# Patient Record
Sex: FEMALE | Race: WHITE | Employment: OTHER | URBAN - METROPOLITAN AREA
[De-identification: names, ages, dates, MRNs, and addresses within clinical notes are randomized per-mention and may not be internally consistent; named-entity substitution may affect disease eponyms.]

---

## 2017-01-09 PROBLEM — E55.9 VITAMIN D DEFICIENCY: Status: ACTIVE | Noted: 2017-01-09

## 2017-03-05 PROBLEM — K75.0 LIVER ABSCESS: Status: ACTIVE | Noted: 2017-03-05

## 2017-03-05 PROBLEM — R63.4 WEIGHT LOSS: Status: ACTIVE | Noted: 2017-03-05

## 2017-03-05 PROBLEM — R11.2 NAUSEA AND VOMITING: Status: ACTIVE | Noted: 2017-03-05

## 2017-03-05 PROBLEM — R19.7 DIARRHEA OF PRESUMED INFECTIOUS ORIGIN: Status: ACTIVE | Noted: 2017-03-05

## 2019-07-10 ENCOUNTER — HOSPITAL ENCOUNTER (OUTPATIENT)
Age: 74
Discharge: HOME | End: 2019-07-12
Payer: MEDICARE

## 2019-07-10 ENCOUNTER — OFFICE VISIT (OUTPATIENT)
Dept: FAMILY MEDICINE CLINIC | Age: 74
End: 2019-07-10
Payer: MEDICARE

## 2019-07-10 VITALS
HEART RATE: 77 BPM | TEMPERATURE: 98.2 F | OXYGEN SATURATION: 96 % | WEIGHT: 244 LBS | RESPIRATION RATE: 18 BRPM | BODY MASS INDEX: 34.93 KG/M2 | SYSTOLIC BLOOD PRESSURE: 132 MMHG | DIASTOLIC BLOOD PRESSURE: 74 MMHG | HEIGHT: 70 IN

## 2019-07-10 DIAGNOSIS — R73.03 PRE-DIABETES: ICD-10-CM

## 2019-07-10 DIAGNOSIS — R53.82 CHRONIC FATIGUE: ICD-10-CM

## 2019-07-10 DIAGNOSIS — F32.A DEPRESSION, UNSPECIFIED DEPRESSION TYPE: ICD-10-CM

## 2019-07-10 DIAGNOSIS — Z13.31 POSITIVE DEPRESSION SCREENING: Primary | ICD-10-CM

## 2019-07-10 DIAGNOSIS — Z12.39 BREAST CANCER SCREENING: ICD-10-CM

## 2019-07-10 PROCEDURE — G8427 DOCREV CUR MEDS BY ELIG CLIN: HCPCS | Performed by: STUDENT IN AN ORGANIZED HEALTH CARE EDUCATION/TRAINING PROGRAM

## 2019-07-10 PROCEDURE — G8417 CALC BMI ABV UP PARAM F/U: HCPCS | Performed by: STUDENT IN AN ORGANIZED HEALTH CARE EDUCATION/TRAINING PROGRAM

## 2019-07-10 PROCEDURE — 36415 COLL VENOUS BLD VENIPUNCTURE: CPT

## 2019-07-10 PROCEDURE — 85025 COMPLETE CBC W/AUTO DIFF WBC: CPT

## 2019-07-10 PROCEDURE — 82728 ASSAY OF FERRITIN: CPT

## 2019-07-10 PROCEDURE — 1123F ACP DISCUSS/DSCN MKR DOCD: CPT | Performed by: STUDENT IN AN ORGANIZED HEALTH CARE EDUCATION/TRAINING PROGRAM

## 2019-07-10 PROCEDURE — 83550 IRON BINDING TEST: CPT

## 2019-07-10 PROCEDURE — 83036 HEMOGLOBIN GLYCOSYLATED A1C: CPT

## 2019-07-10 PROCEDURE — 99213 OFFICE O/P EST LOW 20 MIN: CPT | Performed by: STUDENT IN AN ORGANIZED HEALTH CARE EDUCATION/TRAINING PROGRAM

## 2019-07-10 PROCEDURE — 3017F COLORECTAL CA SCREEN DOC REV: CPT | Performed by: STUDENT IN AN ORGANIZED HEALTH CARE EDUCATION/TRAINING PROGRAM

## 2019-07-10 PROCEDURE — 4040F PNEUMOC VAC/ADMIN/RCVD: CPT | Performed by: STUDENT IN AN ORGANIZED HEALTH CARE EDUCATION/TRAINING PROGRAM

## 2019-07-10 PROCEDURE — 80053 COMPREHEN METABOLIC PANEL: CPT

## 2019-07-10 PROCEDURE — G0444 DEPRESSION SCREEN ANNUAL: HCPCS | Performed by: STUDENT IN AN ORGANIZED HEALTH CARE EDUCATION/TRAINING PROGRAM

## 2019-07-10 PROCEDURE — 80061 LIPID PANEL: CPT

## 2019-07-10 PROCEDURE — G8431 POS CLIN DEPRES SCRN F/U DOC: HCPCS | Performed by: STUDENT IN AN ORGANIZED HEALTH CARE EDUCATION/TRAINING PROGRAM

## 2019-07-10 PROCEDURE — 1036F TOBACCO NON-USER: CPT | Performed by: STUDENT IN AN ORGANIZED HEALTH CARE EDUCATION/TRAINING PROGRAM

## 2019-07-10 PROCEDURE — 36415 COLL VENOUS BLD VENIPUNCTURE: CPT | Performed by: FAMILY MEDICINE

## 2019-07-10 PROCEDURE — 1090F PRES/ABSN URINE INCON ASSESS: CPT | Performed by: STUDENT IN AN ORGANIZED HEALTH CARE EDUCATION/TRAINING PROGRAM

## 2019-07-10 PROCEDURE — 99212 OFFICE O/P EST SF 10 MIN: CPT | Performed by: STUDENT IN AN ORGANIZED HEALTH CARE EDUCATION/TRAINING PROGRAM

## 2019-07-10 PROCEDURE — 83540 ASSAY OF IRON: CPT

## 2019-07-10 PROCEDURE — G8400 PT W/DXA NO RESULTS DOC: HCPCS | Performed by: STUDENT IN AN ORGANIZED HEALTH CARE EDUCATION/TRAINING PROGRAM

## 2019-07-10 RX ORDER — POLYETHYLENE GLYCOL 3350 17 G/17G
17 POWDER, FOR SOLUTION ORAL DAILY PRN
COMMUNITY

## 2019-07-10 SDOH — HEALTH STABILITY: MENTAL HEALTH: HOW MANY STANDARD DRINKS CONTAINING ALCOHOL DO YOU HAVE ON A TYPICAL DAY?: 1 OR 2

## 2019-07-10 SDOH — HEALTH STABILITY: MENTAL HEALTH: HOW OFTEN DO YOU HAVE A DRINK CONTAINING ALCOHOL?: 2-3 TIMES A WEEK

## 2019-07-10 ASSESSMENT — ENCOUNTER SYMPTOMS
CHEST TIGHTNESS: 0
ABDOMINAL PAIN: 0
SHORTNESS OF BREATH: 0

## 2019-07-10 ASSESSMENT — PATIENT HEALTH QUESTIONNAIRE - PHQ9
6. FEELING BAD ABOUT YOURSELF - OR THAT YOU ARE A FAILURE OR HAVE LET YOURSELF OR YOUR FAMILY DOWN: 3
9. THOUGHTS THAT YOU WOULD BE BETTER OFF DEAD, OR OF HURTING YOURSELF: 3
5. POOR APPETITE OR OVEREATING: 2
1. LITTLE INTEREST OR PLEASURE IN DOING THINGS: 0
4. FEELING TIRED OR HAVING LITTLE ENERGY: 2
SUM OF ALL RESPONSES TO PHQ9 QUESTIONS 1 & 2: 3
7. TROUBLE CONCENTRATING ON THINGS, SUCH AS READING THE NEWSPAPER OR WATCHING TELEVISION: 3
SUM OF ALL RESPONSES TO PHQ QUESTIONS 1-9: 18
8. MOVING OR SPEAKING SO SLOWLY THAT OTHER PEOPLE COULD HAVE NOTICED. OR THE OPPOSITE, BEING SO FIGETY OR RESTLESS THAT YOU HAVE BEEN MOVING AROUND A LOT MORE THAN USUAL: 2
SUM OF ALL RESPONSES TO PHQ QUESTIONS 1-9: 18
2. FEELING DOWN, DEPRESSED OR HOPELESS: 3
3. TROUBLE FALLING OR STAYING ASLEEP: 0
10. IF YOU CHECKED OFF ANY PROBLEMS, HOW DIFFICULT HAVE THESE PROBLEMS MADE IT FOR YOU TO DO YOUR WORK, TAKE CARE OF THINGS AT HOME, OR GET ALONG WITH OTHER PEOPLE: 3

## 2019-07-10 NOTE — PROGRESS NOTES
Family Medicine Residency Program  Office Progress Note      Patient : Kasie Almeida : female   Age : 68 y.o.  : 1945   MRN :  [id number]       Date of Service : 7/10/2019    Chief Complaint :   Chief Complaint   Patient presents with    Check-Up    Depression     PHQ-9 Score: 18 positive screen        History of Present Illness :  HPI   Patient is a 24-year-old female coming in today for checkup. She has not seen a doctor in 2 years, used to see Dr. Dorian Rudolph in the past.   She is past due for her mammogram, has also been prediabetic in the past, and there has been no updated lab work in 2 years. Today she does not have any specific complaints, however she scored very high on the PHQ 9 depression screen. She goes into great length about what is stressing her out which include her 4 adult children mainly the youngest one. Expresses to me that she does not know how to say no, and that she is always helping out her children financially. She has a son that will also be coming home in September who is currently incarcerated, she is worried about how to support him. She feels like she has no one to talk to, although endorses that she has no marital issues with her . At this time she is not suicidal, or homicidal.  However she does state that her daron keeps her from doing anything potentially harmful to herself    At this time she denies being willing to try any medications for her depression and anxiety. However she is open to therapy. She will be referred to Dr. Satish Escalante in the clinic. I expressed to her that she would be a great candidate and that this would be an excellent step towards creating a more healthy balance in her life. Review of Systems     Review of Systems   Constitutional: Positive for fatigue. Negative for unexpected weight change. Respiratory: Negative for chest tightness and shortness of breath.     Cardiovascular:

## 2019-07-10 NOTE — PATIENT INSTRUCTIONS
you aren't able to decide for yourself. If you put your wishes in writing, your loved ones and others will know what kind of care you want. They won't need to guess. This can ease your mind and be helpful to others. A living will is not the same as an estate or property will. An estate will explains what you want to happen with your money and property after you die. Is a living will a legal document? A living will is a legal document. Each state has its own laws about living crandall. If you move to another state, make sure that your living will is legal in the state where you now live. Or you might use a universal form that has been approved by many states. This kind of form can sometimes be completed and stored online. Your electronic copy will then be available wherever you have a connection to the Internet. In most cases, doctors will respect your wishes even if you have a form from a different state. · You don't need an  to complete a living will. But legal advice can be helpful if your state's laws are unclear, your health history is complicated, or your family can't agree on what should be in your living will. · You can change your living will at any time. Some people find that their wishes about end-of-life care change as their health changes. · In addition to making a living will, think about completing a medical power of  form. This form lets you name the person you want to make end-of-life treatment decisions for you (your \"health care agent\") if you're not able to. Many hospitals and nursing homes will give you the forms you need to complete a living will and a medical power of . · Your living will is used only if you can't make or communicate decisions for yourself anymore. If you become able to make decisions again, you can accept or refuse any treatment, no matter what you wrote in your living will. · Your state may offer an online registry.  This is a place where you can

## 2019-07-11 LAB
ALBUMIN SERPL-MCNC: 4 G/DL (ref 3.5–5.2)
ALP BLD-CCNC: 99 U/L (ref 35–104)
ALT SERPL-CCNC: 13 U/L (ref 0–32)
ANION GAP SERPL CALCULATED.3IONS-SCNC: 11 MMOL/L (ref 7–16)
AST SERPL-CCNC: 20 U/L (ref 0–31)
BASOPHILS ABSOLUTE: 0.03 E9/L (ref 0–0.2)
BASOPHILS RELATIVE PERCENT: 0.7 % (ref 0–2)
BILIRUB SERPL-MCNC: 0.6 MG/DL (ref 0–1.2)
BUN BLDV-MCNC: 12 MG/DL (ref 8–23)
CALCIUM SERPL-MCNC: 9.4 MG/DL (ref 8.6–10.2)
CHLORIDE BLD-SCNC: 110 MMOL/L (ref 98–107)
CHOLESTEROL, TOTAL: 207 MG/DL (ref 0–199)
CO2: 24 MMOL/L (ref 22–29)
CREAT SERPL-MCNC: 1.1 MG/DL (ref 0.5–1)
EOSINOPHILS ABSOLUTE: 0.04 E9/L (ref 0.05–0.5)
EOSINOPHILS RELATIVE PERCENT: 1 % (ref 0–6)
FERRITIN: 123 NG/ML
GFR AFRICAN AMERICAN: 59
GFR NON-AFRICAN AMERICAN: 49 ML/MIN/1.73
GLUCOSE BLD-MCNC: 93 MG/DL (ref 74–99)
HBA1C MFR BLD: 5.7 % (ref 4–5.6)
HCT VFR BLD CALC: 48.6 % (ref 34–48)
HDLC SERPL-MCNC: 45 MG/DL
HEMOGLOBIN: 14.7 G/DL (ref 11.5–15.5)
IMMATURE GRANULOCYTES #: 0.01 E9/L
IMMATURE GRANULOCYTES %: 0.2 % (ref 0–5)
IRON SATURATION: 40 % (ref 15–50)
IRON: 114 MCG/DL (ref 37–145)
LDL CHOLESTEROL CALCULATED: 138 MG/DL (ref 0–99)
LYMPHOCYTES ABSOLUTE: 0.83 E9/L (ref 1.5–4)
LYMPHOCYTES RELATIVE PERCENT: 20.1 % (ref 20–42)
MCH RBC QN AUTO: 27.2 PG (ref 26–35)
MCHC RBC AUTO-ENTMCNC: 30.2 % (ref 32–34.5)
MCV RBC AUTO: 90 FL (ref 80–99.9)
MONOCYTES ABSOLUTE: 0.61 E9/L (ref 0.1–0.95)
MONOCYTES RELATIVE PERCENT: 14.8 % (ref 2–12)
NEUTROPHILS ABSOLUTE: 2.6 E9/L (ref 1.8–7.3)
NEUTROPHILS RELATIVE PERCENT: 63.2 % (ref 43–80)
PDW BLD-RTO: 12.8 FL (ref 11.5–15)
PLATELET # BLD: 223 E9/L (ref 130–450)
PMV BLD AUTO: 9.8 FL (ref 7–12)
POTASSIUM SERPL-SCNC: 4.2 MMOL/L (ref 3.5–5)
RBC # BLD: 5.4 E12/L (ref 3.5–5.5)
SODIUM BLD-SCNC: 145 MMOL/L (ref 132–146)
TOTAL IRON BINDING CAPACITY: 286 MCG/DL (ref 250–450)
TOTAL PROTEIN: 6.4 G/DL (ref 6.4–8.3)
TRIGL SERPL-MCNC: 118 MG/DL (ref 0–149)
VLDLC SERPL CALC-MCNC: 24 MG/DL
WBC # BLD: 4.1 E9/L (ref 4.5–11.5)

## 2019-07-25 ENCOUNTER — OFFICE VISIT (OUTPATIENT)
Dept: FAMILY MEDICINE CLINIC | Age: 74
End: 2019-07-25
Payer: MEDICARE

## 2019-07-25 VITALS
SYSTOLIC BLOOD PRESSURE: 113 MMHG | DIASTOLIC BLOOD PRESSURE: 69 MMHG | WEIGHT: 242 LBS | TEMPERATURE: 98.4 F | HEART RATE: 80 BPM | HEIGHT: 68 IN | RESPIRATION RATE: 16 BRPM | BODY MASS INDEX: 36.68 KG/M2 | OXYGEN SATURATION: 97 %

## 2019-07-25 DIAGNOSIS — F32.A DEPRESSION, UNSPECIFIED DEPRESSION TYPE: Primary | ICD-10-CM

## 2019-07-25 PROCEDURE — G8428 CUR MEDS NOT DOCUMENT: HCPCS | Performed by: STUDENT IN AN ORGANIZED HEALTH CARE EDUCATION/TRAINING PROGRAM

## 2019-07-25 PROCEDURE — 99213 OFFICE O/P EST LOW 20 MIN: CPT | Performed by: STUDENT IN AN ORGANIZED HEALTH CARE EDUCATION/TRAINING PROGRAM

## 2019-07-25 PROCEDURE — 1036F TOBACCO NON-USER: CPT | Performed by: STUDENT IN AN ORGANIZED HEALTH CARE EDUCATION/TRAINING PROGRAM

## 2019-07-25 PROCEDURE — G8400 PT W/DXA NO RESULTS DOC: HCPCS | Performed by: STUDENT IN AN ORGANIZED HEALTH CARE EDUCATION/TRAINING PROGRAM

## 2019-07-25 PROCEDURE — G8417 CALC BMI ABV UP PARAM F/U: HCPCS | Performed by: STUDENT IN AN ORGANIZED HEALTH CARE EDUCATION/TRAINING PROGRAM

## 2019-07-25 PROCEDURE — 4040F PNEUMOC VAC/ADMIN/RCVD: CPT | Performed by: STUDENT IN AN ORGANIZED HEALTH CARE EDUCATION/TRAINING PROGRAM

## 2019-07-25 PROCEDURE — 99212 OFFICE O/P EST SF 10 MIN: CPT | Performed by: STUDENT IN AN ORGANIZED HEALTH CARE EDUCATION/TRAINING PROGRAM

## 2019-07-25 PROCEDURE — 3017F COLORECTAL CA SCREEN DOC REV: CPT | Performed by: STUDENT IN AN ORGANIZED HEALTH CARE EDUCATION/TRAINING PROGRAM

## 2019-07-25 PROCEDURE — 1123F ACP DISCUSS/DSCN MKR DOCD: CPT | Performed by: STUDENT IN AN ORGANIZED HEALTH CARE EDUCATION/TRAINING PROGRAM

## 2019-07-25 PROCEDURE — 1090F PRES/ABSN URINE INCON ASSESS: CPT | Performed by: STUDENT IN AN ORGANIZED HEALTH CARE EDUCATION/TRAINING PROGRAM

## 2019-07-25 RX ORDER — CITALOPRAM 10 MG/1
10 TABLET ORAL DAILY
Qty: 30 TABLET | Refills: 0 | Status: SHIPPED
Start: 2019-07-25 | End: 2021-06-01

## 2019-07-25 ASSESSMENT — PATIENT HEALTH QUESTIONNAIRE - PHQ9
SUM OF ALL RESPONSES TO PHQ QUESTIONS 1-9: 11
5. POOR APPETITE OR OVEREATING: 0
6. FEELING BAD ABOUT YOURSELF - OR THAT YOU ARE A FAILURE OR HAVE LET YOURSELF OR YOUR FAMILY DOWN: 3
10. IF YOU CHECKED OFF ANY PROBLEMS, HOW DIFFICULT HAVE THESE PROBLEMS MADE IT FOR YOU TO DO YOUR WORK, TAKE CARE OF THINGS AT HOME, OR GET ALONG WITH OTHER PEOPLE: 3
3. TROUBLE FALLING OR STAYING ASLEEP: 0
7. TROUBLE CONCENTRATING ON THINGS, SUCH AS READING THE NEWSPAPER OR WATCHING TELEVISION: 3
4. FEELING TIRED OR HAVING LITTLE ENERGY: 1
SUM OF ALL RESPONSES TO PHQ QUESTIONS 1-9: 11
9. THOUGHTS THAT YOU WOULD BE BETTER OFF DEAD, OR OF HURTING YOURSELF: 2
8. MOVING OR SPEAKING SO SLOWLY THAT OTHER PEOPLE COULD HAVE NOTICED. OR THE OPPOSITE, BEING SO FIGETY OR RESTLESS THAT YOU HAVE BEEN MOVING AROUND A LOT MORE THAN USUAL: 2

## 2019-07-25 ASSESSMENT — ENCOUNTER SYMPTOMS
SHORTNESS OF BREATH: 0
CHEST TIGHTNESS: 0

## 2019-08-07 ENCOUNTER — OFFICE VISIT (OUTPATIENT)
Dept: PSYCHOLOGY | Age: 74
End: 2019-08-07
Payer: MEDICARE

## 2019-08-07 DIAGNOSIS — F33.1 MODERATE EPISODE OF RECURRENT MAJOR DEPRESSIVE DISORDER (HCC): Primary | ICD-10-CM

## 2019-08-07 PROCEDURE — 90791 PSYCH DIAGNOSTIC EVALUATION: CPT | Performed by: PSYCHOLOGIST

## 2019-08-07 ASSESSMENT — PATIENT HEALTH QUESTIONNAIRE - PHQ9
SUM OF ALL RESPONSES TO PHQ QUESTIONS 1-9: 11
2. FEELING DOWN, DEPRESSED OR HOPELESS: 1
9. THOUGHTS THAT YOU WOULD BE BETTER OFF DEAD, OR OF HURTING YOURSELF: 1
SUM OF ALL RESPONSES TO PHQ9 QUESTIONS 1 & 2: 3
6. FEELING BAD ABOUT YOURSELF - OR THAT YOU ARE A FAILURE OR HAVE LET YOURSELF OR YOUR FAMILY DOWN: 1
8. MOVING OR SPEAKING SO SLOWLY THAT OTHER PEOPLE COULD HAVE NOTICED. OR THE OPPOSITE, BEING SO FIGETY OR RESTLESS THAT YOU HAVE BEEN MOVING AROUND A LOT MORE THAN USUAL: 0
7. TROUBLE CONCENTRATING ON THINGS, SUCH AS READING THE NEWSPAPER OR WATCHING TELEVISION: 1
3. TROUBLE FALLING OR STAYING ASLEEP: 3
4. FEELING TIRED OR HAVING LITTLE ENERGY: 1
1. LITTLE INTEREST OR PLEASURE IN DOING THINGS: 2
5. POOR APPETITE OR OVEREATING: 1
SUM OF ALL RESPONSES TO PHQ QUESTIONS 1-9: 11

## 2019-08-07 ASSESSMENT — ANXIETY QUESTIONNAIRES
1. FEELING NERVOUS, ANXIOUS, OR ON EDGE: 1-SEVERAL DAYS
2. NOT BEING ABLE TO STOP OR CONTROL WORRYING: 2-OVER HALF THE DAYS
3. WORRYING TOO MUCH ABOUT DIFFERENT THINGS: 3-NEARLY EVERY DAY
4. TROUBLE RELAXING: 3-NEARLY EVERY DAY
6. BECOMING EASILY ANNOYED OR IRRITABLE: 1-SEVERAL DAYS
GAD7 TOTAL SCORE: 10
7. FEELING AFRAID AS IF SOMETHING AWFUL MIGHT HAPPEN: 0-NOT AT ALL SURE
5. BEING SO RESTLESS THAT IT IS HARD TO SIT STILL: 0-NOT AT ALL SURE

## 2019-08-07 NOTE — PROGRESS NOTES
5 hours of sleep. She thinks part of the limited sleep hours is due to pain with staying still for too long. She is ok with sleeping some at night and napping during the day. She has some trouble concentrating but thinks it is mostly when feeling anxious. She also feels angry at times. Sometimes this is directed at herself, particularly when feeling depressed. Caring for her granddaughter often triggers an angry outburst as well. She participated in counseling with one of her daughters when she was a child but has not sought counseling for herself. Anxiety symptoms: She identified frequent worrying, mostly about family along with feeling antsy and restless. Difficulty concentrating also accompanies worry. She denied panic attacks and significant social anxiety. Objective:   Appearance    alert, cooperative  Appetite abnormal: increased  Sleep disturbance Yes  Fatigue minimal  Loss of pleasure no but loss of motivation  Impulsive behavior No  Speech    normal rate and normal volume  Mood    Anxious  Depressed  Affect    normal affect  Thought Content    intact  Thought Process    linear  Associations    logical connections  Insight    Fair  Judgment    Fair  Orientation    oriented to person, place, time, and general circumstances  Memory    recent and remote memory intact  Attention/Concentration    impaired  Ability to understand instructions Yes  Ability to respond meaningfully Yes  Morbid ideation Yes  Suicide Assessment    no suicidal ideation    History:    Medications:   Current Outpatient Medications   Medication Sig Dispense Refill    citalopram (CELEXA) 10 MG tablet Take 1 tablet by mouth daily 30 tablet 0    polyethylene glycol (GLYCOLAX) powder Take 17 g by mouth daily as needed      loratadine (CLARITIN) 10 MG tablet Take 1 tablet by mouth daily 30 tablet 3     No current facility-administered medications for this visit.         Social History:   Social History     Socioeconomic History    accompanied by symptoms of anxiety as well, including worrying about her family, feeling restless, and having difficulty concentrating. These symptoms seem to be a part of the MDD rather than meeting the criteria for an independent anxiety disorder. She reported the support of her  and her daron as positive coping strategies but also agreed to participate in behavioral health treatment. Diagnosis:  Major depressive disorder; recurrent and moderate      Diagnosis Date    Diverticulosis of large intestine with hemorrhage 2/28/2016    Ulcer      Problems with primary support group      PHQ Scores 7/25/2019 7/10/2019   PHQ2 Score - 3   PHQ9 Score 11 18     Interpretation of Total Score Depression Severity: 1-4 = Minimal depression, 5-9 = Mild depression, 10-14 = Moderate depression, 15-19 = Moderately severe depression, 20-27 = Severe depression    No flowsheet data found. VERA-7 TESTING                    Interpretation of Total Score Anxiety Severity: 0-4 Subclinical anxiety, 5-9 = Mild anxiety, 10-14 = Moderate anxiety, 15-21 = Severe anxiety        Current outpatient prescriptions:   Current Outpatient Medications   Medication Sig Dispense Refill    citalopram (CELEXA) 10 MG tablet Take 1 tablet by mouth daily 30 tablet 0    polyethylene glycol (GLYCOLAX) powder Take 17 g by mouth daily as needed      loratadine (CLARITIN) 10 MG tablet Take 1 tablet by mouth daily 30 tablet 3     No current facility-administered medications for this visit.         Interventions:  Provided education on the use of medication to treat  depression, Discussed various factors related to the development and maintenance of  depression (including biological, cognitive, behavioral, and environmental factors), Discussed potential treatments for  depression, Discussed self-care (sleep, nutrition, rewarding activities, social support, exercise), Established rapport, Conducted functional assessment, [place]-setting to identify pt's

## 2019-08-18 NOTE — PROGRESS NOTES
Behavioral Medicine Notes  Progress Note  8/19/2019  10:06 AM    Time spent with Patient: 40 minutes  This is patient's second  [facility] appointment. Reason for Consult:  depression  Referring Provider: Leonela Alegre MD  [facility] [address]  [place], [address] [place] [address]    Collateral Parties Present: none    Subjective:   Patient reported some improvement in mood. She attributed the change to quitting her job last week. Building on our last visit, she realized that the uncertainty of work hours and then an assignment to clients that increased her stress level was likely a major factor related to increasing mood symptoms. She has some anxiety about finances but is significantly relieved overall. She continues to have some discord with her daughter about the level of babysitting she does. She has noticed feeling irritable and being bothered by everything and finds herself picking fights with her . She doesn't like it when she acts in this manner. Despite improved mood, she still feels like everything is a chore and takes effort, including basic tasks like getting dressed. She is also bothered by poor communciation with her  and does not think that he speaks directly about what he wants. Finally, the patient went to the pharmacy to  the recently prescribed antidepressant and found out the medication is $15. She realized that she has reservations about starting antidepressant medication in addition to concerns about cost and does not want to start it at this time. Reinforced positive changes. Helped patient process emotions related to full intermediate. Provided a feelings list to help identify accurate and specific emotions. Discussed assertive communication and boundary setting to improve her communication with her kids and to help reframe her decision to increase their independence and responsibility rather than viewing her behavior as \"not helping\" them.  She agreed to discuss her medication reservations (CELEXA) 10 MG tablet Take 1 tablet by mouth daily 30 tablet 0    polyethylene glycol (GLYCOLAX) powder Take 17 g by mouth daily as needed      loratadine (CLARITIN) 10 MG tablet Take 1 tablet by mouth daily 30 tablet 3     No current facility-administered medications for this visit. Interventions:  Provided education on the use of medication to treat  depression, Trained in strategies for increasing balanced thinking, Discussed self-care (sleep, nutrition, rewarding activities, social support, exercise), Discussed and problem-solved barriers in adhering to behavioral change plan, Motivational Interviewing to increase patient confidence and compliance with adhering to behavioral change plan, Supportive techniques, Emphasized self-care as important for managing overall health, Cognitive strategies to target negative emotions including cognitive reframing and Identified maladaptive thoughts        Plan/Recommendations:  1. Participate in behavioral health treatment to learn strategies to improve mood, including behavioral activation, cognitive restructuring, assertive communication, healthy sleep habits, and mindfulness. 2. Use feelings list to help identify specific emotions when feeling overwhelmed  3.  Discuss concerns and preferences regarding antidepressant medication with PCP    Return to Clinic: 2 weeks      Annalisa Arias, PhD

## 2019-08-19 ENCOUNTER — OFFICE VISIT (OUTPATIENT)
Dept: PSYCHOLOGY | Age: 74
End: 2019-08-19
Payer: MEDICARE

## 2019-08-19 DIAGNOSIS — F33.1 MODERATE EPISODE OF RECURRENT MAJOR DEPRESSIVE DISORDER (HCC): Primary | ICD-10-CM

## 2019-08-19 PROCEDURE — 90834 PSYTX W PT 45 MINUTES: CPT | Performed by: PSYCHOLOGIST

## 2019-08-19 ASSESSMENT — PATIENT HEALTH QUESTIONNAIRE - PHQ9
9. THOUGHTS THAT YOU WOULD BE BETTER OFF DEAD, OR OF HURTING YOURSELF: 0
1. LITTLE INTEREST OR PLEASURE IN DOING THINGS: 1
5. POOR APPETITE OR OVEREATING: 1
SUM OF ALL RESPONSES TO PHQ9 QUESTIONS 1 & 2: 2
3. TROUBLE FALLING OR STAYING ASLEEP: 0
8. MOVING OR SPEAKING SO SLOWLY THAT OTHER PEOPLE COULD HAVE NOTICED. OR THE OPPOSITE, BEING SO FIGETY OR RESTLESS THAT YOU HAVE BEEN MOVING AROUND A LOT MORE THAN USUAL: 0
2. FEELING DOWN, DEPRESSED OR HOPELESS: 1
4. FEELING TIRED OR HAVING LITTLE ENERGY: 1
SUM OF ALL RESPONSES TO PHQ QUESTIONS 1-9: 4
7. TROUBLE CONCENTRATING ON THINGS, SUCH AS READING THE NEWSPAPER OR WATCHING TELEVISION: 0
6. FEELING BAD ABOUT YOURSELF - OR THAT YOU ARE A FAILURE OR HAVE LET YOURSELF OR YOUR FAMILY DOWN: 0
SUM OF ALL RESPONSES TO PHQ QUESTIONS 1-9: 4

## 2019-08-19 ASSESSMENT — ANXIETY QUESTIONNAIRES
2. NOT BEING ABLE TO STOP OR CONTROL WORRYING: 1-SEVERAL DAYS
5. BEING SO RESTLESS THAT IT IS HARD TO SIT STILL: 0-NOT AT ALL SURE
7. FEELING AFRAID AS IF SOMETHING AWFUL MIGHT HAPPEN: 1-SEVERAL DAYS
6. BECOMING EASILY ANNOYED OR IRRITABLE: 1-SEVERAL DAYS
1. FEELING NERVOUS, ANXIOUS, OR ON EDGE: 1-SEVERAL DAYS
3. WORRYING TOO MUCH ABOUT DIFFERENT THINGS: 1-SEVERAL DAYS
GAD7 TOTAL SCORE: 5
4. TROUBLE RELAXING: 0-NOT AT ALL SURE

## 2019-08-30 ENCOUNTER — OFFICE VISIT (OUTPATIENT)
Dept: FAMILY MEDICINE CLINIC | Age: 74
End: 2019-08-30
Payer: MEDICARE

## 2019-08-30 VITALS
OXYGEN SATURATION: 94 % | HEIGHT: 68 IN | BODY MASS INDEX: 36.8 KG/M2 | HEART RATE: 81 BPM | RESPIRATION RATE: 14 BRPM | TEMPERATURE: 98 F | DIASTOLIC BLOOD PRESSURE: 62 MMHG | SYSTOLIC BLOOD PRESSURE: 104 MMHG

## 2019-08-30 DIAGNOSIS — F32.A DEPRESSION, UNSPECIFIED DEPRESSION TYPE: ICD-10-CM

## 2019-08-30 PROCEDURE — 3017F COLORECTAL CA SCREEN DOC REV: CPT | Performed by: STUDENT IN AN ORGANIZED HEALTH CARE EDUCATION/TRAINING PROGRAM

## 2019-08-30 PROCEDURE — 4040F PNEUMOC VAC/ADMIN/RCVD: CPT | Performed by: STUDENT IN AN ORGANIZED HEALTH CARE EDUCATION/TRAINING PROGRAM

## 2019-08-30 PROCEDURE — 1090F PRES/ABSN URINE INCON ASSESS: CPT | Performed by: STUDENT IN AN ORGANIZED HEALTH CARE EDUCATION/TRAINING PROGRAM

## 2019-08-30 PROCEDURE — 1036F TOBACCO NON-USER: CPT | Performed by: STUDENT IN AN ORGANIZED HEALTH CARE EDUCATION/TRAINING PROGRAM

## 2019-08-30 PROCEDURE — 99212 OFFICE O/P EST SF 10 MIN: CPT | Performed by: STUDENT IN AN ORGANIZED HEALTH CARE EDUCATION/TRAINING PROGRAM

## 2019-08-30 PROCEDURE — 1123F ACP DISCUSS/DSCN MKR DOCD: CPT | Performed by: STUDENT IN AN ORGANIZED HEALTH CARE EDUCATION/TRAINING PROGRAM

## 2019-08-30 PROCEDURE — G8427 DOCREV CUR MEDS BY ELIG CLIN: HCPCS | Performed by: STUDENT IN AN ORGANIZED HEALTH CARE EDUCATION/TRAINING PROGRAM

## 2019-08-30 PROCEDURE — G8417 CALC BMI ABV UP PARAM F/U: HCPCS | Performed by: STUDENT IN AN ORGANIZED HEALTH CARE EDUCATION/TRAINING PROGRAM

## 2019-08-30 PROCEDURE — G8400 PT W/DXA NO RESULTS DOC: HCPCS | Performed by: STUDENT IN AN ORGANIZED HEALTH CARE EDUCATION/TRAINING PROGRAM

## 2019-08-30 PROCEDURE — 99213 OFFICE O/P EST LOW 20 MIN: CPT | Performed by: STUDENT IN AN ORGANIZED HEALTH CARE EDUCATION/TRAINING PROGRAM

## 2019-08-30 ASSESSMENT — PATIENT HEALTH QUESTIONNAIRE - PHQ9
7. TROUBLE CONCENTRATING ON THINGS, SUCH AS READING THE NEWSPAPER OR WATCHING TELEVISION: 0
1. LITTLE INTEREST OR PLEASURE IN DOING THINGS: 3
3. TROUBLE FALLING OR STAYING ASLEEP: 0
8. MOVING OR SPEAKING SO SLOWLY THAT OTHER PEOPLE COULD HAVE NOTICED. OR THE OPPOSITE, BEING SO FIGETY OR RESTLESS THAT YOU HAVE BEEN MOVING AROUND A LOT MORE THAN USUAL: 0
5. POOR APPETITE OR OVEREATING: 0
2. FEELING DOWN, DEPRESSED OR HOPELESS: 2
10. IF YOU CHECKED OFF ANY PROBLEMS, HOW DIFFICULT HAVE THESE PROBLEMS MADE IT FOR YOU TO DO YOUR WORK, TAKE CARE OF THINGS AT HOME, OR GET ALONG WITH OTHER PEOPLE: 3
SUM OF ALL RESPONSES TO PHQ QUESTIONS 1-9: 6
4. FEELING TIRED OR HAVING LITTLE ENERGY: 1
SUM OF ALL RESPONSES TO PHQ9 QUESTIONS 1 & 2: 5
SUM OF ALL RESPONSES TO PHQ QUESTIONS 1-9: 6
9. THOUGHTS THAT YOU WOULD BE BETTER OFF DEAD, OR OF HURTING YOURSELF: 0
6. FEELING BAD ABOUT YOURSELF - OR THAT YOU ARE A FAILURE OR HAVE LET YOURSELF OR YOUR FAMILY DOWN: 0

## 2019-08-30 NOTE — PROGRESS NOTES
Patient is a 60-year-old female coming in today for depression follow-up. PHQ 9 today is 6, last PHQ 9 was 4. The patient has recently gone to her 's first cousin , she was not close to this person however it was a stressful day for her. Patient also stated that she has not taken the Celexa, she feels like she is does not need it at this time. 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 Denies any SI or HI. Blood pressure 104/62, pulse 81, temperature 98 °F (36.7 °C), temperature source Oral, resp. rate 14, height 5' 8\" (1.727 m), SpO2 94 %. HEENT WNL     Heart regular    Lungs clear    abd non-tender      No edema    Pulses intact       Follow with Dr Brizuela Class    After that meeting would discuss whether to take Celexa    To schedule mammogram    RTO 2 mos for follow up      Attending Physician Statement  I have discussed the case, including pertinent history and exam findings with the resident. I agree with the documented assessment and plan.

## 2019-09-03 ASSESSMENT — ENCOUNTER SYMPTOMS
SHORTNESS OF BREATH: 0
ABDOMINAL PAIN: 0

## 2019-09-09 NOTE — PROGRESS NOTES
never used smokeless tobacco.  ETOH:   reports that she drinks alcohol. Family History:   Family History   Problem Relation Age of Onset    Substance Abuse Child          Assessment:   Patient referred to address depression. She has experienced symptoms for a number of years that she attributes to stress related to her adult children. She feels down at times throughout the week and endorsed significant difficulties with motivation and interest. The depression is accompanied by symptoms of anxiety as well, including worrying about her family, feeling restless, and having difficulty concentrating. These symptoms seem to be a part of the MDD rather than meeting the criteria for an independent anxiety disorder. She reported the support of her  and her daron as positive coping strategies but also agreed to participate in behavioral health treatment.     Diagnosis:  Major depressive disorder; recurrent and moderate      Diagnosis Date    Diverticulosis of large intestine with hemorrhage 2/28/2016    Ulcer      Problems with primary support group      PHQ Scores 8/30/2019 8/19/2019 8/7/2019 7/25/2019 7/10/2019   PHQ2 Score 5 2 3 - 3   PHQ9 Score 6 4 11 11 18     Interpretation of Total Score Depression Severity: 1-4 = Minimal depression, 5-9 = Mild depression, 10-14 = Moderate depression, 15-19 = Moderately severe depression, 20-27 = Severe depression    VERA 7 SCORE 8/19/2019 8/7/2019   VERA-7 Total Score 5 10     VERA-7 TESTING                    Interpretation of Total Score Anxiety Severity: 0-4 Subclinical anxiety, 5-9 = Mild anxiety, 10-14 = Moderate anxiety, 15-21 = Severe anxiety        Current outpatient prescriptions:   Current Outpatient Medications   Medication Sig Dispense Refill    citalopram (CELEXA) 10 MG tablet Take 1 tablet by mouth daily (Patient not taking: Reported on 8/30/2019) 30 tablet 0    polyethylene glycol (GLYCOLAX) powder Take 17 g by mouth daily as needed      loratadine

## 2019-09-10 ENCOUNTER — OFFICE VISIT (OUTPATIENT)
Dept: PSYCHOLOGY | Age: 74
End: 2019-09-10
Payer: MEDICARE

## 2019-09-10 DIAGNOSIS — F33.0 MILD EPISODE OF RECURRENT MAJOR DEPRESSIVE DISORDER (HCC): Primary | ICD-10-CM

## 2019-09-10 PROCEDURE — 90832 PSYTX W PT 30 MINUTES: CPT | Performed by: PSYCHOLOGIST

## 2019-09-10 ASSESSMENT — PATIENT HEALTH QUESTIONNAIRE - PHQ9
5. POOR APPETITE OR OVEREATING: 1
4. FEELING TIRED OR HAVING LITTLE ENERGY: 1
8. MOVING OR SPEAKING SO SLOWLY THAT OTHER PEOPLE COULD HAVE NOTICED. OR THE OPPOSITE, BEING SO FIGETY OR RESTLESS THAT YOU HAVE BEEN MOVING AROUND A LOT MORE THAN USUAL: 0
1. LITTLE INTEREST OR PLEASURE IN DOING THINGS: 1
SUM OF ALL RESPONSES TO PHQ QUESTIONS 1-9: 6
6. FEELING BAD ABOUT YOURSELF - OR THAT YOU ARE A FAILURE OR HAVE LET YOURSELF OR YOUR FAMILY DOWN: 1
3. TROUBLE FALLING OR STAYING ASLEEP: 1
2. FEELING DOWN, DEPRESSED OR HOPELESS: 1
SUM OF ALL RESPONSES TO PHQ QUESTIONS 1-9: 6
9. THOUGHTS THAT YOU WOULD BE BETTER OFF DEAD, OR OF HURTING YOURSELF: 0
7. TROUBLE CONCENTRATING ON THINGS, SUCH AS READING THE NEWSPAPER OR WATCHING TELEVISION: 0
SUM OF ALL RESPONSES TO PHQ9 QUESTIONS 1 & 2: 2

## 2019-09-10 ASSESSMENT — ANXIETY QUESTIONNAIRES
4. TROUBLE RELAXING: 0-NOT AT ALL SURE
2. NOT BEING ABLE TO STOP OR CONTROL WORRYING: 1-SEVERAL DAYS
7. FEELING AFRAID AS IF SOMETHING AWFUL MIGHT HAPPEN: 1-SEVERAL DAYS
5. BEING SO RESTLESS THAT IT IS HARD TO SIT STILL: 0-NOT AT ALL SURE
3. WORRYING TOO MUCH ABOUT DIFFERENT THINGS: 0-NOT AT ALL SURE
1. FEELING NERVOUS, ANXIOUS, OR ON EDGE: 1-SEVERAL DAYS
GAD7 TOTAL SCORE: 4
6. BECOMING EASILY ANNOYED OR IRRITABLE: 1-SEVERAL DAYS

## 2019-09-26 ENCOUNTER — OFFICE VISIT (OUTPATIENT)
Dept: PSYCHOLOGY | Age: 74
End: 2019-09-26
Payer: MEDICARE

## 2019-09-26 DIAGNOSIS — F33.41 RECURRENT MAJOR DEPRESSIVE DISORDER, IN PARTIAL REMISSION (HCC): Primary | ICD-10-CM

## 2019-09-26 PROCEDURE — 90832 PSYTX W PT 30 MINUTES: CPT | Performed by: PSYCHOLOGIST

## 2019-09-26 ASSESSMENT — PATIENT HEALTH QUESTIONNAIRE - PHQ9
4. FEELING TIRED OR HAVING LITTLE ENERGY: 0
SUM OF ALL RESPONSES TO PHQ QUESTIONS 1-9: 2
8. MOVING OR SPEAKING SO SLOWLY THAT OTHER PEOPLE COULD HAVE NOTICED. OR THE OPPOSITE, BEING SO FIGETY OR RESTLESS THAT YOU HAVE BEEN MOVING AROUND A LOT MORE THAN USUAL: 0
1. LITTLE INTEREST OR PLEASURE IN DOING THINGS: 0
SUM OF ALL RESPONSES TO PHQ9 QUESTIONS 1 & 2: 1
2. FEELING DOWN, DEPRESSED OR HOPELESS: 1
7. TROUBLE CONCENTRATING ON THINGS, SUCH AS READING THE NEWSPAPER OR WATCHING TELEVISION: 0
9. THOUGHTS THAT YOU WOULD BE BETTER OFF DEAD, OR OF HURTING YOURSELF: 0
5. POOR APPETITE OR OVEREATING: 0
SUM OF ALL RESPONSES TO PHQ QUESTIONS 1-9: 2
3. TROUBLE FALLING OR STAYING ASLEEP: 0
6. FEELING BAD ABOUT YOURSELF - OR THAT YOU ARE A FAILURE OR HAVE LET YOURSELF OR YOUR FAMILY DOWN: 1

## 2019-09-26 ASSESSMENT — ANXIETY QUESTIONNAIRES
1. FEELING NERVOUS, ANXIOUS, OR ON EDGE: 1-SEVERAL DAYS
6. BECOMING EASILY ANNOYED OR IRRITABLE: 1-SEVERAL DAYS
7. FEELING AFRAID AS IF SOMETHING AWFUL MIGHT HAPPEN: 0-NOT AT ALL SURE
4. TROUBLE RELAXING: 0-NOT AT ALL SURE
3. WORRYING TOO MUCH ABOUT DIFFERENT THINGS: 1-SEVERAL DAYS
5. BEING SO RESTLESS THAT IT IS HARD TO SIT STILL: 0-NOT AT ALL SURE
GAD7 TOTAL SCORE: 4
2. NOT BEING ABLE TO STOP OR CONTROL WORRYING: 1-SEVERAL DAYS

## 2019-10-23 ENCOUNTER — OFFICE VISIT (OUTPATIENT)
Dept: PSYCHOLOGY | Age: 74
End: 2019-10-23
Payer: MEDICARE

## 2019-10-23 DIAGNOSIS — F33.41 RECURRENT MAJOR DEPRESSIVE DISORDER, IN PARTIAL REMISSION (HCC): Primary | ICD-10-CM

## 2019-10-23 PROCEDURE — 90832 PSYTX W PT 30 MINUTES: CPT | Performed by: PSYCHOLOGIST

## 2019-10-23 ASSESSMENT — ANXIETY QUESTIONNAIRES
1. FEELING NERVOUS, ANXIOUS, OR ON EDGE: 1-SEVERAL DAYS
4. TROUBLE RELAXING: 0-NOT AT ALL
GAD7 TOTAL SCORE: 5
6. BECOMING EASILY ANNOYED OR IRRITABLE: 1-SEVERAL DAYS
7. FEELING AFRAID AS IF SOMETHING AWFUL MIGHT HAPPEN: 1-SEVERAL DAYS
2. NOT BEING ABLE TO STOP OR CONTROL WORRYING: 1-SEVERAL DAYS
5. BEING SO RESTLESS THAT IT IS HARD TO SIT STILL: 0-NOT AT ALL
3. WORRYING TOO MUCH ABOUT DIFFERENT THINGS: 1-SEVERAL DAYS

## 2019-10-23 ASSESSMENT — PATIENT HEALTH QUESTIONNAIRE - PHQ9
7. TROUBLE CONCENTRATING ON THINGS, SUCH AS READING THE NEWSPAPER OR WATCHING TELEVISION: 1
SUM OF ALL RESPONSES TO PHQ QUESTIONS 1-9: 5
3. TROUBLE FALLING OR STAYING ASLEEP: 1
8. MOVING OR SPEAKING SO SLOWLY THAT OTHER PEOPLE COULD HAVE NOTICED. OR THE OPPOSITE, BEING SO FIGETY OR RESTLESS THAT YOU HAVE BEEN MOVING AROUND A LOT MORE THAN USUAL: 0
6. FEELING BAD ABOUT YOURSELF - OR THAT YOU ARE A FAILURE OR HAVE LET YOURSELF OR YOUR FAMILY DOWN: 1
5. POOR APPETITE OR OVEREATING: 0
9. THOUGHTS THAT YOU WOULD BE BETTER OFF DEAD, OR OF HURTING YOURSELF: 0
SUM OF ALL RESPONSES TO PHQ9 QUESTIONS 1 & 2: 1
2. FEELING DOWN, DEPRESSED OR HOPELESS: 1
4. FEELING TIRED OR HAVING LITTLE ENERGY: 1
1. LITTLE INTEREST OR PLEASURE IN DOING THINGS: 0
SUM OF ALL RESPONSES TO PHQ QUESTIONS 1-9: 5

## 2019-10-31 ENCOUNTER — OFFICE VISIT (OUTPATIENT)
Dept: FAMILY MEDICINE CLINIC | Age: 74
End: 2019-10-31
Payer: MEDICARE

## 2019-10-31 VITALS
DIASTOLIC BLOOD PRESSURE: 76 MMHG | HEART RATE: 77 BPM | OXYGEN SATURATION: 96 % | SYSTOLIC BLOOD PRESSURE: 114 MMHG | HEIGHT: 69 IN | RESPIRATION RATE: 16 BRPM | TEMPERATURE: 98.1 F | WEIGHT: 244.4 LBS | BODY MASS INDEX: 36.2 KG/M2

## 2019-10-31 DIAGNOSIS — F32.A DEPRESSION, UNSPECIFIED DEPRESSION TYPE: ICD-10-CM

## 2019-10-31 PROCEDURE — G8427 DOCREV CUR MEDS BY ELIG CLIN: HCPCS | Performed by: FAMILY MEDICINE

## 2019-10-31 PROCEDURE — 1090F PRES/ABSN URINE INCON ASSESS: CPT | Performed by: FAMILY MEDICINE

## 2019-10-31 PROCEDURE — 4040F PNEUMOC VAC/ADMIN/RCVD: CPT | Performed by: FAMILY MEDICINE

## 2019-10-31 PROCEDURE — 1036F TOBACCO NON-USER: CPT | Performed by: FAMILY MEDICINE

## 2019-10-31 PROCEDURE — 99212 OFFICE O/P EST SF 10 MIN: CPT | Performed by: STUDENT IN AN ORGANIZED HEALTH CARE EDUCATION/TRAINING PROGRAM

## 2019-10-31 PROCEDURE — 3017F COLORECTAL CA SCREEN DOC REV: CPT | Performed by: FAMILY MEDICINE

## 2019-10-31 PROCEDURE — 99213 OFFICE O/P EST LOW 20 MIN: CPT | Performed by: STUDENT IN AN ORGANIZED HEALTH CARE EDUCATION/TRAINING PROGRAM

## 2019-10-31 PROCEDURE — G8484 FLU IMMUNIZE NO ADMIN: HCPCS | Performed by: FAMILY MEDICINE

## 2019-10-31 PROCEDURE — G8417 CALC BMI ABV UP PARAM F/U: HCPCS | Performed by: FAMILY MEDICINE

## 2019-10-31 PROCEDURE — 1123F ACP DISCUSS/DSCN MKR DOCD: CPT | Performed by: FAMILY MEDICINE

## 2019-10-31 PROCEDURE — G8400 PT W/DXA NO RESULTS DOC: HCPCS | Performed by: FAMILY MEDICINE

## 2019-11-03 ASSESSMENT — ENCOUNTER SYMPTOMS
NAUSEA: 0
SHORTNESS OF BREATH: 0
WHEEZING: 0
COUGH: 0
DIARRHEA: 0
VOMITING: 0
ABDOMINAL PAIN: 0

## 2019-11-20 ENCOUNTER — OFFICE VISIT (OUTPATIENT)
Dept: PSYCHOLOGY | Age: 74
End: 2019-11-20
Payer: MEDICARE

## 2019-11-20 DIAGNOSIS — F33.41 RECURRENT MAJOR DEPRESSIVE DISORDER, IN PARTIAL REMISSION (HCC): Primary | ICD-10-CM

## 2019-11-20 PROCEDURE — 90832 PSYTX W PT 30 MINUTES: CPT | Performed by: PSYCHOLOGIST

## 2019-11-20 ASSESSMENT — PATIENT HEALTH QUESTIONNAIRE - PHQ9
SUM OF ALL RESPONSES TO PHQ QUESTIONS 1-9: 6
4. FEELING TIRED OR HAVING LITTLE ENERGY: 1
7. TROUBLE CONCENTRATING ON THINGS, SUCH AS READING THE NEWSPAPER OR WATCHING TELEVISION: 0
8. MOVING OR SPEAKING SO SLOWLY THAT OTHER PEOPLE COULD HAVE NOTICED. OR THE OPPOSITE, BEING SO FIGETY OR RESTLESS THAT YOU HAVE BEEN MOVING AROUND A LOT MORE THAN USUAL: 0
SUM OF ALL RESPONSES TO PHQ QUESTIONS 1-9: 6
5. POOR APPETITE OR OVEREATING: 1
1. LITTLE INTEREST OR PLEASURE IN DOING THINGS: 1
SUM OF ALL RESPONSES TO PHQ9 QUESTIONS 1 & 2: 2
3. TROUBLE FALLING OR STAYING ASLEEP: 1
2. FEELING DOWN, DEPRESSED OR HOPELESS: 1
6. FEELING BAD ABOUT YOURSELF - OR THAT YOU ARE A FAILURE OR HAVE LET YOURSELF OR YOUR FAMILY DOWN: 1
9. THOUGHTS THAT YOU WOULD BE BETTER OFF DEAD, OR OF HURTING YOURSELF: 0

## 2019-11-20 ASSESSMENT — ANXIETY QUESTIONNAIRES
GAD7 TOTAL SCORE: 6
6. BECOMING EASILY ANNOYED OR IRRITABLE: 2-OVER HALF THE DAYS
1. FEELING NERVOUS, ANXIOUS, OR ON EDGE: 1-SEVERAL DAYS
5. BEING SO RESTLESS THAT IT IS HARD TO SIT STILL: 0-NOT AT ALL
7. FEELING AFRAID AS IF SOMETHING AWFUL MIGHT HAPPEN: 1-SEVERAL DAYS
2. NOT BEING ABLE TO STOP OR CONTROL WORRYING: 1-SEVERAL DAYS
3. WORRYING TOO MUCH ABOUT DIFFERENT THINGS: 1-SEVERAL DAYS

## 2020-01-06 NOTE — PROGRESS NOTES
Behavioral Medicine Notes  Progress Note  1/7/2020  9:34 AM    Time spent with Patient: 30 minutes  This is patient's seventh  [facility] appointment. Reason for Consult:  depression  Referring Provider: Nehemiah Alcala MD  [address]  [place], [address] [facility]    Collateral Parties Present: none    Subjective:   Patient reported that she has been feeling pretty much the same as last visit. She described some discord over the holidays between she and her daughter who lives in [facility]. She was able to practice cognitive reframing to help manage her emotions. She also described some stress related to feeling that there was \"chaos\" in her home over the holidays because she and her  like to keep a quiet home without visitors and there were some family events. She set a New Years Resolution to do a houshehold task one day per week and is happy with some of her progress already. She also had the opporuitnity to work a few days and recognized that she didn't feel like it but was able to use self talk to remind herself it would be fine. Reinforced positive changes. Explored patient's use of cognitive strategies to help manage emotions. Explored goals for activity level and used motivational strategies to help patient identify benefits to engaging in some productive and/or social activity to benefit mood. She agreed to consider accepting part-time work. Discussed need for continued care given sustained improvement over several visits. She decided to not schedule a follow up and knows that she can return should symptoms increase. Objective: Pt arrived on time; pleasant, cooperative. Patient mood is: unchanged since last session. Affect is: euthymic  Patient denies any suicidal or homicidal ideation, plan or intent at this time.       History:    Medications:   Current Outpatient Medications   Medication Sig Dispense Refill    citalopram (CELEXA) 10 MG tablet Take 1 tablet by mouth daily 30 tablet 0    Interventions:  Trained in strategies for increasing balanced thinking, Discussed and set plan for behavioral activation, Trained in improving communication skills, Discussed self-care (sleep, nutrition, rewarding activities, social support, exercise), Discussed and problem-solved barriers in adhering to behavioral change plan, Motivational Interviewing to increase patient confidence and compliance with adhering to behavioral change plan, Supportive techniques, Emphasized self-care as important for managing overall health, Cognitive strategies to target negative emotions including cognitive reframing, Identified maladaptive thoughts and Identified relevant behavioral strategies for targeting anxiety including using lists to organize tasks        Plan/Recommendations:  1. Participate in behavioral health treatment to learn strategies to improve mood, including behavioral activation, cognitive restructuring, assertive communication, healthy sleep habits, and mindfulness. 2. Use feelings list to help identify specific emotions when feeling overwhelmed  3. Complete weekly list of tasks to pick 3 from every day  4. Practice mindfulness when feeling bad about past mistakes or worrying about future  5.  Practice cognitive reframing when having negative thoughts and feeling out of control with babysitting responsibilities    Return to Clinic: JAIRO Wheeler, PhD

## 2020-01-07 ENCOUNTER — OFFICE VISIT (OUTPATIENT)
Dept: PSYCHOLOGY | Age: 75
End: 2020-01-07
Payer: MEDICARE

## 2020-01-07 PROCEDURE — 90832 PSYTX W PT 30 MINUTES: CPT | Performed by: PSYCHOLOGIST

## 2021-02-01 ENCOUNTER — TELEPHONE (OUTPATIENT)
Dept: ADMINISTRATIVE | Age: 76
End: 2021-02-01

## 2021-02-01 ENCOUNTER — OFFICE VISIT (OUTPATIENT)
Dept: FAMILY MEDICINE CLINIC | Age: 76
End: 2021-02-01
Payer: MEDICARE

## 2021-02-01 ENCOUNTER — HOSPITAL ENCOUNTER (OUTPATIENT)
Age: 76
Discharge: HOME | End: 2021-02-03
Payer: MEDICARE

## 2021-02-01 ENCOUNTER — HOSPITAL ENCOUNTER (OUTPATIENT)
Dept: GENERAL RADIOLOGY | Age: 76
Discharge: HOME | End: 2021-02-03
Payer: MEDICARE

## 2021-02-01 ENCOUNTER — NURSE TRIAGE (OUTPATIENT)
Dept: OTHER | Facility: CLINIC | Age: 76
End: 2021-02-01

## 2021-02-01 VITALS
HEART RATE: 95 BPM | SYSTOLIC BLOOD PRESSURE: 127 MMHG | WEIGHT: 244 LBS | DIASTOLIC BLOOD PRESSURE: 82 MMHG | RESPIRATION RATE: 16 BRPM | TEMPERATURE: 94.2 F | BODY MASS INDEX: 36.14 KG/M2 | HEIGHT: 69 IN | OXYGEN SATURATION: 96 %

## 2021-02-01 DIAGNOSIS — R73.9 HYPERGLYCEMIA: Primary | ICD-10-CM

## 2021-02-01 DIAGNOSIS — E66.9 CLASS 2 OBESITY WITH BODY MASS INDEX (BMI) OF 36.0 TO 36.9 IN ADULT, UNSPECIFIED OBESITY TYPE, UNSPECIFIED WHETHER SERIOUS COMORBIDITY PRESENT: ICD-10-CM

## 2021-02-01 DIAGNOSIS — R60.0 LOWER EXTREMITY EDEMA: ICD-10-CM

## 2021-02-01 DIAGNOSIS — R73.9 HYPERGLYCEMIA: ICD-10-CM

## 2021-02-01 DIAGNOSIS — D64.9 ANEMIA, UNSPECIFIED TYPE: ICD-10-CM

## 2021-02-01 DIAGNOSIS — M79.604 RIGHT LEG PAIN: ICD-10-CM

## 2021-02-01 LAB
BASOPHILS ABSOLUTE: 0.04 E9/L (ref 0–0.2)
BASOPHILS RELATIVE PERCENT: 0.7 % (ref 0–2)
EOSINOPHILS ABSOLUTE: 0.09 E9/L (ref 0.05–0.5)
EOSINOPHILS RELATIVE PERCENT: 1.5 % (ref 0–6)
HCT VFR BLD CALC: 50.1 % (ref 34–48)
HEMOGLOBIN: 15.3 G/DL (ref 11.5–15.5)
IMMATURE GRANULOCYTES #: 0.01 E9/L
IMMATURE GRANULOCYTES %: 0.2 % (ref 0–5)
LYMPHOCYTES ABSOLUTE: 1.15 E9/L (ref 1.5–4)
LYMPHOCYTES RELATIVE PERCENT: 19.7 % (ref 20–42)
MCH RBC QN AUTO: 27 PG (ref 26–35)
MCHC RBC AUTO-ENTMCNC: 30.5 % (ref 32–34.5)
MCV RBC AUTO: 88.4 FL (ref 80–99.9)
MONOCYTES ABSOLUTE: 0.68 E9/L (ref 0.1–0.95)
MONOCYTES RELATIVE PERCENT: 11.6 % (ref 2–12)
NEUTROPHILS ABSOLUTE: 3.87 E9/L (ref 1.8–7.3)
NEUTROPHILS RELATIVE PERCENT: 66.3 % (ref 43–80)
PDW BLD-RTO: 12.5 FL (ref 11.5–15)
PLATELET # BLD: 243 E9/L (ref 130–450)
PMV BLD AUTO: 10 FL (ref 7–12)
RBC # BLD: 5.67 E12/L (ref 3.5–5.5)
WBC # BLD: 5.8 E9/L (ref 4.5–11.5)

## 2021-02-01 PROCEDURE — 4040F PNEUMOC VAC/ADMIN/RCVD: CPT | Performed by: STUDENT IN AN ORGANIZED HEALTH CARE EDUCATION/TRAINING PROGRAM

## 2021-02-01 PROCEDURE — G8484 FLU IMMUNIZE NO ADMIN: HCPCS | Performed by: STUDENT IN AN ORGANIZED HEALTH CARE EDUCATION/TRAINING PROGRAM

## 2021-02-01 PROCEDURE — 73590 X-RAY EXAM OF LOWER LEG: CPT

## 2021-02-01 PROCEDURE — 1036F TOBACCO NON-USER: CPT | Performed by: STUDENT IN AN ORGANIZED HEALTH CARE EDUCATION/TRAINING PROGRAM

## 2021-02-01 PROCEDURE — 99202 OFFICE O/P NEW SF 15 MIN: CPT | Performed by: STUDENT IN AN ORGANIZED HEALTH CARE EDUCATION/TRAINING PROGRAM

## 2021-02-01 PROCEDURE — 1090F PRES/ABSN URINE INCON ASSESS: CPT | Performed by: STUDENT IN AN ORGANIZED HEALTH CARE EDUCATION/TRAINING PROGRAM

## 2021-02-01 PROCEDURE — 3017F COLORECTAL CA SCREEN DOC REV: CPT | Performed by: STUDENT IN AN ORGANIZED HEALTH CARE EDUCATION/TRAINING PROGRAM

## 2021-02-01 PROCEDURE — 99213 OFFICE O/P EST LOW 20 MIN: CPT | Performed by: STUDENT IN AN ORGANIZED HEALTH CARE EDUCATION/TRAINING PROGRAM

## 2021-02-01 PROCEDURE — G8419 CALC BMI OUT NRM PARAM NOF/U: HCPCS | Performed by: STUDENT IN AN ORGANIZED HEALTH CARE EDUCATION/TRAINING PROGRAM

## 2021-02-01 PROCEDURE — G8400 PT W/DXA NO RESULTS DOC: HCPCS | Performed by: STUDENT IN AN ORGANIZED HEALTH CARE EDUCATION/TRAINING PROGRAM

## 2021-02-01 PROCEDURE — 1123F ACP DISCUSS/DSCN MKR DOCD: CPT | Performed by: STUDENT IN AN ORGANIZED HEALTH CARE EDUCATION/TRAINING PROGRAM

## 2021-02-01 PROCEDURE — G8427 DOCREV CUR MEDS BY ELIG CLIN: HCPCS | Performed by: STUDENT IN AN ORGANIZED HEALTH CARE EDUCATION/TRAINING PROGRAM

## 2021-02-01 NOTE — TELEPHONE ENCOUNTER
Patient called in with c/o rt. Foot swelling pain when she puts pressure on it. Tx to nurse triage [facility] for review.

## 2021-02-01 NOTE — TELEPHONE ENCOUNTER
Patient called [facility] pre-service [facility])  with red flag complaint. Brief description of triage: Pt reports having right foot pain x2 weeks. States is the inside of right foot and around ankle and achilles. Rates 10/10 with weight bearing. No pain with sitting. Triage indicates for patient to have appt with PCP today or go to [facility] if no appts available. Care advice provided, patient verbalizes understanding; denies any other questions or concerns; instructed to call back for any new or worsening symptoms. Writer provided warm transfer to [facility] for appointment scheduling. Attention Provider: Thank you for allowing me to participate in the care of your patient. The patient was connected to triage in response to information provided to the [facility]. Please do not respond through this encounter as the response is not directed to a shared pool. Reason for Disposition   SEVERE pain (e.g., excruciating, unable to do any normal activities)    Answer Assessment - Initial Assessment Questions  1. ONSET: \"When did the pain start? \"       Approx 2 weeks    2. LOCATION: \"Where is the pain located? \"       Right foot -inside, around the ankle, achilles    3. PAIN: \"How bad is the pain? \"    (Scale 1-10; or mild, moderate, severe)    -  MILD (1-3): doesn't interfere with normal activities     -  MODERATE (4-7): interferes with normal activities (e.g., work or school) or awakens from sleep, limping     -  SEVERE (8-10): excruciating pain, unable to do any normal activities, unable to walk      10/10 with walking and bearing weight. No pain with sitting. 4. WORK OR EXERCISE: \"Has there been any recent work or exercise that involved this part of the body? \"       Denies    5. CAUSE: \"What do you think is causing the foot pain? \"      Denies    6. OTHER SYMPTOMS: \"Do you have any other symptoms? \" (e.g., leg pain, rash, fever, numbness)      Difficulty putting full pressure/weight on foot.      7. PREGNANCY: \"Is there any chance you are pregnant? \" \"When was your last menstrual period? \"      N/a    Protocols used: FOOT PAIN-ADULT-[place]

## 2021-02-01 NOTE — PROGRESS NOTES
S: 76 y.o. female here for R foot pain. Woke up and put her leg on ground and felt pain in ankle. Worse w/ use. Improves w/ rest. No trauma. Cough w/ productive whitish sputum, associated w/ BLE swelling, worse w/ lying down. No sob. O: VS: /82 (Site: Right Upper Arm, Position: Sitting, Cuff Size: Medium Adult)   Pulse 95   Temp 94.2 °F (34.6 °C) (Cerebral)   Resp 16   Ht 5' 9\" (1.753 m)   Wt 244 lb (110.7 kg)   SpO2 96%   BMI 36.03 kg/m²    General: NAD, alert and interacting appropriately. CV:  RRR, no gallops, rubs, or murmurs    Resp: CTAB   Abd:  Soft, nontender   Ext: 2+ ble edema. No ttp foot/ankle, w/ FROM. Bunion noted. Impression: hairline fx vs plantar fasciitis vs calcaneal spur. Leg swelling. Cough. Plan:   Xray. Heel pad. Echo, labs. Continue kathleen hose  rtc 1 mo for leg swelling    Attending Physician Statement  I have discussed the case, including pertinent history and exam findings with the resident. I agree with the documented assessment and plan.

## 2021-02-01 NOTE — PROGRESS NOTES
[facility] MEDICINE RESIDENCY PROGRAM  DATE OF VISIT : 2021    Patient : Pricilla Russell   Age : 76 y.o.  : 1945   MRN : [id number]   ______________________________________________________________________      Assessment & Plan :     Diagnosis Orders   1. Hyperglycemia  Hemoglobin A1C   2. Anemia, unspecified type  Comprehensive Metabolic Panel    CBC Auto Differential   3. Lower extremity edema  ECHO COMPLETE   4. Class 2 obesity with body mass index (BMI) of 36.0 to 36.9 in adult, unspecified obesity type, unspecified whether serious comorbidity present  TSH without Reflex    Lipid Panel   5.  Right leg pain  XR TIBIA FIBULA RIGHT (2 VIEWS)    XR ANKLE RIGHT (MIN 3 VIEWS)       Right leg pain - hairline fx vs plantar fascitis - xray   Echo   Labs   Les staton   rtc 1 mo for leg swelling     Chief Complaint :   Chief Complaint   Patient presents with    Foot Pain     right    Cough     had for a long time       HPI : Pricilla Russell is 76 y.o. female who presented to the clinic today for     Leg pain   Woke up one day   Put her foot on the ground and has ankle pain   Worse with movement   Better with rest   Mo trauma       Cough   Productive: white sputum   Worse when she lays down   With Sob and LE swelling     Drinks socially   Beer 2-3 times week   No smoke   No drugs     Hx of Liver abscess - resolved, no abdo pain       Past Medical History :  Past Medical History:   Diagnosis Date    Diverticulosis of large intestine with hemorrhage 2016    Ulcer      Past Surgical History:   Procedure Laterality Date    ANKLE FRACTURE SURGERY Left 1982    Fall    COLONOSCOPY  16    Dr. James Navarro, COLON, DIAGNOSTIC      FINGER AMPUTATION Left     Tip of middle finger removed after door injury    TIBIA FRACTURE SURGERY Left 1982    Fall    UPPER GASTROINTESTINAL ENDOSCOPY  16    Dr. Yuki Kessler         Review of Systems :    ROS - Per HPI ______________________________________________________________________    Physical Exam :    Vitals: /82 (Site: Right Upper Arm, Position: Sitting, Cuff Size: Medium Adult)   Pulse 95   Temp 94.2 °F (34.6 °C) (Cerebral)   Resp 16   Ht 5' 9\" (1.753 m)   Wt 244 lb (110.7 kg)   SpO2 96%   BMI 36.03 kg/m²   GENERAL: Alert, cooperative, no acute distress. CHEST: No tenderness or deformity, full & symmetric excursion  LUNG: Clear to auscultation bilaterally,  respirations unlabored. No rales/wheezing/rubs  HEART: RRR, S1 and S2 normal, no murmur, rub or gallop. DP pulses 2/4  ABDOMEN: SNTND, no masses, no organomegaly, no guarding, rebound or rigidity. EXTREMITIES:  Extremities normal, atraumatic, no cyanosis. 2+ edema. No ttp to foot/ankle. Full ROM.  halux valgus present     ______________________________________________________________________        Qian Hernandez MD

## 2021-02-02 ENCOUNTER — HOSPITAL ENCOUNTER (OUTPATIENT)
Age: 76
End: 2021-02-02
Payer: MEDICARE

## 2021-02-02 ENCOUNTER — HOSPITAL ENCOUNTER (OUTPATIENT)
Dept: GENERAL RADIOLOGY | Age: 76
Discharge: HOME | End: 2021-02-04
Payer: MEDICARE

## 2021-02-02 DIAGNOSIS — M79.604 RIGHT LEG PAIN: ICD-10-CM

## 2021-02-02 LAB
ALBUMIN SERPL-MCNC: 4.3 G/DL (ref 3.5–5.2)
ALP BLD-CCNC: 103 U/L (ref 35–104)
ALT SERPL-CCNC: 15 U/L (ref 0–32)
ANION GAP SERPL CALCULATED.3IONS-SCNC: 13 MMOL/L (ref 7–16)
AST SERPL-CCNC: 18 U/L (ref 0–31)
BILIRUB SERPL-MCNC: 0.8 MG/DL (ref 0–1.2)
BUN BLDV-MCNC: 9 MG/DL (ref 8–23)
CALCIUM SERPL-MCNC: 9.4 MG/DL (ref 8.6–10.2)
CHLORIDE BLD-SCNC: 109 MMOL/L (ref 98–107)
CHOLESTEROL, TOTAL: 222 MG/DL (ref 0–199)
CO2: 22 MMOL/L (ref 22–29)
CREAT SERPL-MCNC: 1.1 MG/DL (ref 0.5–1)
GFR AFRICAN AMERICAN: 59
GFR NON-AFRICAN AMERICAN: 48 ML/MIN/1.73
GLUCOSE BLD-MCNC: 90 MG/DL (ref 74–99)
HBA1C MFR BLD: 5.6 % (ref 4–5.6)
HDLC SERPL-MCNC: 53 MG/DL
LDL CHOLESTEROL CALCULATED: 151 MG/DL (ref 0–99)
POTASSIUM SERPL-SCNC: 4.6 MMOL/L (ref 3.5–5)
SODIUM BLD-SCNC: 144 MMOL/L (ref 132–146)
TOTAL PROTEIN: 7.1 G/DL (ref 6.4–8.3)
TRIGL SERPL-MCNC: 88 MG/DL (ref 0–149)
TSH SERPL DL<=0.05 MIU/L-ACNC: 4.37 UIU/ML (ref 0.27–4.2)
VLDLC SERPL CALC-MCNC: 18 MG/DL

## 2021-02-02 PROCEDURE — 73610 X-RAY EXAM OF ANKLE: CPT

## 2021-02-24 ENCOUNTER — TELEPHONE (OUTPATIENT)
Dept: FAMILY MEDICINE CLINIC | Age: 76
End: 2021-02-24

## 2021-02-24 DIAGNOSIS — M77.50 BONE SPUR OF FOOT: Primary | ICD-10-CM

## 2021-02-24 NOTE — TELEPHONE ENCOUNTER
Patient phoned stated that she got her second xray done and would like to know the results  Please advise  Thank you April

## 2021-02-25 ENCOUNTER — HOSPITAL ENCOUNTER (OUTPATIENT)
Dept: NON INVASIVE DIAGNOSTICS | Age: 76
Discharge: HOME | End: 2021-02-25
Payer: MEDICARE

## 2021-02-25 DIAGNOSIS — R60.0 LOWER EXTREMITY EDEMA: ICD-10-CM

## 2021-02-25 LAB
LV EF: 63 %
LVEF MODALITY: NORMAL

## 2021-02-25 PROCEDURE — 6360000004 HC RX CONTRAST MEDICATION: Performed by: STUDENT IN AN ORGANIZED HEALTH CARE EDUCATION/TRAINING PROGRAM

## 2021-02-25 PROCEDURE — C8929 TTE W OR WO FOL WCON,DOPPLER: HCPCS

## 2021-02-25 RX ADMIN — PERFLUTREN 1.65 MG: 6.52 INJECTION, SUSPENSION INTRAVENOUS at 14:16

## 2021-03-10 ENCOUNTER — TELEPHONE (OUTPATIENT)
Dept: ADMINISTRATIVE | Age: 76
End: 2021-03-10

## 2021-03-10 NOTE — TELEPHONE ENCOUNTER
Patient called in to schedule an in office appointment patient said she has a cough and needs test results offered a virtual appointment. Patient refused and hung up.

## 2021-03-25 ENCOUNTER — HOSPITAL ENCOUNTER (OUTPATIENT)
Dept: GENERAL RADIOLOGY | Age: 76
Discharge: HOME | End: 2021-03-27
Payer: MEDICARE

## 2021-03-25 ENCOUNTER — OFFICE VISIT (OUTPATIENT)
Dept: FAMILY MEDICINE CLINIC | Age: 76
End: 2021-03-25
Payer: MEDICARE

## 2021-03-25 ENCOUNTER — HOSPITAL ENCOUNTER (OUTPATIENT)
Age: 76
Discharge: HOME | End: 2021-03-27
Payer: MEDICARE

## 2021-03-25 VITALS
HEART RATE: 68 BPM | DIASTOLIC BLOOD PRESSURE: 60 MMHG | SYSTOLIC BLOOD PRESSURE: 124 MMHG | OXYGEN SATURATION: 95 % | HEIGHT: 68 IN | BODY MASS INDEX: 36.68 KG/M2 | TEMPERATURE: 96.9 F | WEIGHT: 242 LBS

## 2021-03-25 DIAGNOSIS — R05.9 COUGH: ICD-10-CM

## 2021-03-25 DIAGNOSIS — K26.9 DUODENAL ULCER: Primary | ICD-10-CM

## 2021-03-25 PROCEDURE — G8484 FLU IMMUNIZE NO ADMIN: HCPCS | Performed by: STUDENT IN AN ORGANIZED HEALTH CARE EDUCATION/TRAINING PROGRAM

## 2021-03-25 PROCEDURE — 4040F PNEUMOC VAC/ADMIN/RCVD: CPT | Performed by: STUDENT IN AN ORGANIZED HEALTH CARE EDUCATION/TRAINING PROGRAM

## 2021-03-25 PROCEDURE — G8400 PT W/DXA NO RESULTS DOC: HCPCS | Performed by: STUDENT IN AN ORGANIZED HEALTH CARE EDUCATION/TRAINING PROGRAM

## 2021-03-25 PROCEDURE — 99212 OFFICE O/P EST SF 10 MIN: CPT | Performed by: STUDENT IN AN ORGANIZED HEALTH CARE EDUCATION/TRAINING PROGRAM

## 2021-03-25 PROCEDURE — 3017F COLORECTAL CA SCREEN DOC REV: CPT | Performed by: STUDENT IN AN ORGANIZED HEALTH CARE EDUCATION/TRAINING PROGRAM

## 2021-03-25 PROCEDURE — 71046 X-RAY EXAM CHEST 2 VIEWS: CPT

## 2021-03-25 PROCEDURE — G8417 CALC BMI ABV UP PARAM F/U: HCPCS | Performed by: STUDENT IN AN ORGANIZED HEALTH CARE EDUCATION/TRAINING PROGRAM

## 2021-03-25 PROCEDURE — 1036F TOBACCO NON-USER: CPT | Performed by: STUDENT IN AN ORGANIZED HEALTH CARE EDUCATION/TRAINING PROGRAM

## 2021-03-25 PROCEDURE — 1090F PRES/ABSN URINE INCON ASSESS: CPT | Performed by: STUDENT IN AN ORGANIZED HEALTH CARE EDUCATION/TRAINING PROGRAM

## 2021-03-25 PROCEDURE — 1123F ACP DISCUSS/DSCN MKR DOCD: CPT | Performed by: STUDENT IN AN ORGANIZED HEALTH CARE EDUCATION/TRAINING PROGRAM

## 2021-03-25 PROCEDURE — G8427 DOCREV CUR MEDS BY ELIG CLIN: HCPCS | Performed by: STUDENT IN AN ORGANIZED HEALTH CARE EDUCATION/TRAINING PROGRAM

## 2021-03-25 PROCEDURE — 99213 OFFICE O/P EST LOW 20 MIN: CPT | Performed by: STUDENT IN AN ORGANIZED HEALTH CARE EDUCATION/TRAINING PROGRAM

## 2021-03-25 RX ORDER — FLUTICASONE PROPIONATE 50 MCG
2 SPRAY, SUSPENSION (ML) NASAL DAILY
Qty: 3 BOTTLE | Refills: 1 | Status: SHIPPED
Start: 2021-03-25 | End: 2021-06-01

## 2021-03-25 RX ORDER — PANTOPRAZOLE SODIUM 40 MG/1
40 TABLET, DELAYED RELEASE ORAL
Qty: 30 TABLET | Refills: 3 | Status: SHIPPED
Start: 2021-03-25 | End: 2021-05-03 | Stop reason: SDUPTHER

## 2021-03-25 RX ORDER — LORATADINE 10 MG/1
10 TABLET ORAL DAILY
Qty: 30 TABLET | Refills: 3 | Status: SHIPPED | OUTPATIENT
Start: 2021-03-25

## 2021-03-25 ASSESSMENT — PATIENT HEALTH QUESTIONNAIRE - PHQ9
SUM OF ALL RESPONSES TO PHQ QUESTIONS 1-9: 0
2. FEELING DOWN, DEPRESSED OR HOPELESS: 0
SUM OF ALL RESPONSES TO PHQ9 QUESTIONS 1 & 2: 0
SUM OF ALL RESPONSES TO PHQ QUESTIONS 1-9: 0

## 2021-03-25 NOTE — PROGRESS NOTES
[address] [facility] RESIDENCY PROGRAM  DATE OF VISIT : 3/26/2021    Patient : Tyrone Kirkland   Age : 76 y.o.  : 1945   MRN : [id number]   ______________________________________________________________________      Assessment & Plan :     Diagnosis Orders   1. Duodenal ulcer  Jayne Jacobs MD, General Surgery, [facility]   2. Cough  XR CHEST STANDARD (2 VW)       Will refer to general surgery for be scoping because of duodenal ulcer  Restart PPI  Cough likely secondary to GERD VS seasonal allergies, will treat with PPI Claritin and Flonase  RTC 1 month    Chief Complaint :   Chief Complaint   Patient presents with    Results    Cough     using mucus relief not working    Abdominal Pain     discomfort       HPI : Tyrone Kirkland is 76 y.o. female who presented to the clinic today for       Cough -chronic productive, has been going on for more than 3 months now, does produce clearish whitish sputum which is worse in the morning, and does feel like she occasionally has chest congestion and postnasal drip. Patient also has a history of duodenal ulcer supposed to be on PPIs however she has been off of it because she was afraid of side effects. Last EGD was in  which showed a duodenal ulcer. With the cough sometimes she occasionally has epigastric abdominal pain that does not radiate. Patient underwent echo for bilateral lower extremity swelling, the echo was within normal limits, patient says that the swelling is gone now.           Past Medical History :  Past Medical History:   Diagnosis Date    Diverticulosis of large intestine with hemorrhage 2016    Ulcer      Past Surgical History:   Procedure Laterality Date    ANKLE FRACTURE SURGERY Left     Fall    COLONOSCOPY  16    Dr. Samir Martini, COLON, DIAGNOSTIC      FINGER AMPUTATION Left     Tip of middle finger removed after door injury   [facility] Left 1982    Fall

## 2021-03-25 NOTE — PATIENT INSTRUCTIONS
Please call and make apt with:   MICHELLE Cruz MD  [facility]  [facility], [address] [place] [address]  [phone]

## 2021-03-25 NOTE — PROGRESS NOTES
Attending Physician Statement    S:   Chief Complaint   Patient presents with    Results    Cough     using mucus relief not working    Abdominal Pain     discomfort      Patient is a 76year old female here to discuss results of echo. Bilateral lower extremity swelling. Swelling is gone. Continues to have productive cough. Has history of duodenal ulcer. Ppi. Last egd was 2016 . Was on prilosec in the past. But was afraid of side effects. No sob. Cough is worse in the morning     O: Blood pressure 124/60, pulse 68, temperature 96.9 °F (36.1 °C), temperature source Temporal, height 5' 8\" (1.727 m), weight 242 lb (109.8 kg), SpO2 95 %. Exam:   Heart - RRR   Lungs - clear   Ext- no peripheral edema   A: cough likely 2/2 gerd vs postnasal drip  P:  Start protonix    Referral to gi for egd    Chest xray for cough    claritin and flonase    Follow-up as ordered    Attending Attestation   I have discussed the case, including pertinent history and exam findings with the resident. I agree with the documented assessment and plan.

## 2021-03-26 ENCOUNTER — TELEPHONE (OUTPATIENT)
Dept: SURGERY | Age: 76
End: 2021-03-26

## 2021-03-26 NOTE — TELEPHONE ENCOUNTER
Patient was referred by Dr. Margarita Villagran for Duodenal ulcer . Patient was scheduled on 4/19/21 in [place] office @ 9:00 am with Dr. James Wilson. Patient was instructed to bring a photo ID, insurance card (if applicable), and list of any current medications. Patient verbalized understanding of appointment instructions.         Electronically signed by Fer Sherwood on 3/26/21 at 2:56 PM EDT

## 2021-03-28 ENCOUNTER — IMMUNIZATION (OUTPATIENT)
Dept: PRIMARY CARE CLINIC | Age: 76
End: 2021-03-28
Payer: MEDICARE

## 2021-03-28 PROCEDURE — 0001A COVID-19, PFIZER VACCINE 30MCG/0.3ML DOSE: CPT | Performed by: INTERNAL MEDICINE

## 2021-03-28 PROCEDURE — 91300 COVID-19, PFIZER VACCINE 30MCG/0.3ML DOSE: CPT | Performed by: INTERNAL MEDICINE

## 2021-04-19 ENCOUNTER — OFFICE VISIT (OUTPATIENT)
Dept: SURGERY | Age: 76
End: 2021-04-19
Payer: MEDICARE

## 2021-04-19 VITALS
RESPIRATION RATE: 14 BRPM | WEIGHT: 244 LBS | BODY MASS INDEX: 36.98 KG/M2 | HEART RATE: 78 BPM | TEMPERATURE: 98.1 F | DIASTOLIC BLOOD PRESSURE: 77 MMHG | OXYGEN SATURATION: 96 % | SYSTOLIC BLOOD PRESSURE: 115 MMHG | HEIGHT: 68 IN

## 2021-04-19 DIAGNOSIS — K21.00 GASTROESOPHAGEAL REFLUX DISEASE WITH ESOPHAGITIS WITHOUT HEMORRHAGE: ICD-10-CM

## 2021-04-19 DIAGNOSIS — Z80.0 FAMILY HX OF COLON CANCER: ICD-10-CM

## 2021-04-19 PROCEDURE — 99203 OFFICE O/P NEW LOW 30 MIN: CPT | Performed by: SURGERY

## 2021-04-19 PROCEDURE — 1123F ACP DISCUSS/DSCN MKR DOCD: CPT | Performed by: SURGERY

## 2021-04-19 PROCEDURE — 99202 OFFICE O/P NEW SF 15 MIN: CPT | Performed by: SURGERY

## 2021-04-19 PROCEDURE — 3017F COLORECTAL CA SCREEN DOC REV: CPT | Performed by: SURGERY

## 2021-04-19 PROCEDURE — G8427 DOCREV CUR MEDS BY ELIG CLIN: HCPCS | Performed by: SURGERY

## 2021-04-19 PROCEDURE — G8417 CALC BMI ABV UP PARAM F/U: HCPCS | Performed by: SURGERY

## 2021-04-19 PROCEDURE — G8400 PT W/DXA NO RESULTS DOC: HCPCS | Performed by: SURGERY

## 2021-04-19 PROCEDURE — 4040F PNEUMOC VAC/ADMIN/RCVD: CPT | Performed by: SURGERY

## 2021-04-19 PROCEDURE — 1036F TOBACCO NON-USER: CPT | Performed by: SURGERY

## 2021-04-19 PROCEDURE — 1090F PRES/ABSN URINE INCON ASSESS: CPT | Performed by: SURGERY

## 2021-04-19 RX ORDER — POLYETHYLENE GLYCOL 3350, SODIUM SULFATE ANHYDROUS, SODIUM BICARBONATE, SODIUM CHLORIDE, POTASSIUM CHLORIDE 236; 22.74; 6.74; 5.86; 2.97 G/4L; G/4L; G/4L; G/4L; G/4L
4 POWDER, FOR SOLUTION ORAL ONCE
Qty: 4000 ML | Refills: 0 | Status: SHIPPED | OUTPATIENT
Start: 2021-04-19 | End: 2021-04-19

## 2021-04-19 ASSESSMENT — ENCOUNTER SYMPTOMS
BLOOD IN STOOL: 0
BACK PAIN: 0
VOMITING: 0
ABDOMINAL PAIN: 0
EYES NEGATIVE: 1
ALLERGIC/IMMUNOLOGIC NEGATIVE: 1
CONSTIPATION: 0
RESPIRATORY NEGATIVE: 1
NAUSEA: 0
COUGH: 0
ANAL BLEEDING: 0
ABDOMINAL DISTENTION: 1
SHORTNESS OF BREATH: 0
DIARRHEA: 0

## 2021-04-19 NOTE — PATIENT INSTRUCTIONS
Instructions for Clear liquid diet  Definition  A clear liquid diet consists of clear liquids, such as water, broth and plain gelatin, that are easily digested and leave no undigested residue in your intestinal tract. Your doctor may prescribe a clear liquid diet before certain medical procedures or if you have certain digestive problems. Because a clear liquid diet can't provide you with adequate calories and nutrients, it shouldn't be continued for more than a few days. Purpose  A clear liquid diet is often used before tests, procedures or surgeries that require no food in your stomach or intestines, such as before colonoscopy. It may also be recommended as a short-term diet if you have certain digestive problems, such as nausea, vomiting or diarrhea, or after certain types of surgery. Diet details  A clear liquid diet helps maintain adequate hydration, provides some important electrolytes, such as sodium and potassium, and gives some energy at a time when a full diet isn't possible or recommended.    The following foods are allowed in a clear liquid diet:    Plain water    Fruit juices without pulp, such as apple juice, orange, white grape or white  cranberry    Strained lemonade    Clear, fat-free broth (Chicken)   Clear sodas (NO DARK SODA)   Plain gelatin    Honey    Ice pops without bits of fruit or fruit pulp    Tea or coffee without milk or cream    *Nothing RED or PURPLE  * If you SEE THROUGH IT then you can have it    A typical menu on the clear liquid diet may look like this:     Breakfast:  1 glass fruit juice  1 cup coffee or tea (without dairy products)  1 cup broth  1 bowl gelatin     Snack:  1 glass fruit juice  1 bowl gelatin     Lunch:  1 glass fruit juice  1 glass water  1 cup broth  1 bowl gelatin     Snack:  1 ice pop (without fruit pulp)  1 cup coffee or tea (without dairy products) or a soft drink     Dinner:  1 cup juice or water  1 cup broth  1 bowl gelatin  1 cup coffee or tea     Results  Although the clear liquid diet may not be very exciting, it does fulfill its purpose. It's designed to keep your stomach and intestines clear, limit strain to your digestive system, but keep your body hydrated as you prepare for or recover from a medical procedure. Risks  Because a clear liquid diet can't provide you with adequate calories and nutrients, it shouldn't be used for more than a few days. Only use the clear liquid diet as directed by your doctor. If your doctor prescribes a clear liquid diet before a medical test, be sure to follow the diet instructions exactly. If you don't follow the diet exactly, you risk an inaccurate test and may have to reschedule the procedure for another time. The importance of proper hydration  A colonoscopy prep causes the body to lose a significant amount of fluid and can result in sickness due to dehydration. It's important that you prepare your body by drinking extra clear liquids before the prep. Stay hydrated by drinking all required clear liquids during the prep. Replenish your system by drinking clear liquids after returning home from your colonoscopy. hospitals Surgery  Golytely or Nulytely  COLON PREP FOR COLONOSCOPY OR COLON SURGERY    It is very important that you follow all of the instructions listed on this sheet carefully (they may be slightly different than the directions on the product that you purchase at the pharmacy) to ensure that you colon is adequately cleaned out or you risk of complications could be increased. 2 Days or More Before Endoscopy:   Obtain Golytely, Colyte or Nulytely, depending on the prescription the surgeon gave you, from the pharmacy.  Mix Golytely, Colyte or Nulytely according to instructions and refrigerate.  Do not eat corn, tomatoes, peas or watermelon 3 to 5 days before procedure.    If you are on INSULIN or OTHER DIABETIC MEDICATIONS, then check with your primary care physician as to how to adjust your medication while on clear liquid diet and when nothing by mouth. 1 Day Before the Endoscopy:   No solid food - only clear liquids (soup, jello, or juice that you can see through with no solid food) for breakfast, lunch and supper. ·  DO NOT drink or eat anything that is red or purple as it will turn the inside of the colon red and look like blood.  Begin drinking the Golytely, Colyte or Nulytely early in the afternoon (between 1pm and 4pm - the earlier the better). Drink and 8oz glass of this solution every 10 minutes until you have drank the entire gallon. It is best to drink the whole glass rapidly rather than sipping small amounts continuously.  Bowel movements should occur about one hour after the first glass of Golytely, Colyte, or Nulytely. They will continue periodically for approximately 1-2 hours after you finish drinking the last glass. By this time the stool should be liquid and clear.  Feelings of bloating and/or nausea are common after the first few glasses because of the large volume of fluid ingested. This is temporary, however, and will improve once bowel movements begin.  If you have nausea or vomiting, notify your physician. Day of Endoscopy:   Nothing to eat or drink after midnight the night before the endoscopy. However, if you are taking any blood pressure medications or heart medications, you should take them with a sip of water. Any questions or concerns call Dahlia at [phone]. Patient Information and Instructions for  Upper GI Endoscopy or Esophagogastroduodenoscopy [EGD])         Definition Upper GI Endoscopy or Esophagogastroduodenoscopy [EGD])  This is a test that uses a fiberoptic scope to examine the esophagus (throat), stomach, and upper part of the small intestines.      Upper GI endoscopy may be recommended if you have:   Abdominal pain   Severe heartburn   Persistent nausea and vomiting   Difficulty swallowing   Blood in stool or vomit   Abnormal x-ray or other examinations of the gastrointestinal tract     Conditions that can be diagnosed with upper GI endoscopy include:   Ulcers   Tumors   Polyps   Abnormal narrowing   Inflammation     Possible Complications   Complications are rare, but no procedure is completely free of risk. If you are planning to have upper GI endoscopy, your doctor will review a list of possible complications, which may include:   Bleeding   Damage to the esophagus, stomach, or intestine   Infection   Respiratory depression (reduced breathing rate and/or depth)   Reaction to sedatives or anesthesia causing your blood pressure to drop    Some factors that may increase the risk of complications include:   Age: 61 or older   Pregnancy   Obesity   Smoking , alcoholism , or drug use   Malnutrition   Recent illness   Diabetes   Heart or lung problems   Bleeding disorders   Use of certain medicines     Be sure to discuss these risks with your doctor before the test.     What to Expect   Prior to test   Leading up to the test:   Arrange for a ride home after the test. Also, arrange for help at home. The night before, eat a light meal.  Do not eat or drink anything after midnight the night before the test.   Talk to your doctor about your medicines. You may be asked to stop taking some medicines up to one week before the procedure, like:   Anti-inflammatory drugs (e.g., aspirin )   Blood thinners, like clopidogrel (Plavix) or warfarin (Coumadin)     Description of the Test   You will be asked to lie on your left side. You will have monitors tracking your breathing, heart rate, and blood oxygen levels. You will be given supplemental oxygen to breathe through your nose. A mouthpiece will be positioned to help keep your mouth open. Your throat may be sprayed with a numbing medicine.  You will be given a sedative through an IV to help you relax during the test.  During the test, a small suction tube will be used to clear saliva and fluids from your mouth. The endoscope will be lubricated and placed in your mouth. You will be asked to try to swallow it. Then, it will be carefully and slowly advanced down your throat. It will be passed through your esophagus and into your stomach and intestine. While the endoscope is being advanced, your doctor will view the images on the screen. Air will be passed through the endoscope into your digestive tract. This will be done to smooth the normal folds in the tissues, allowing your doctor to view the tissue more easily. Tiny tools may be passed through the endoscope in order to take biopsies or do other tests. After Test   After the test, you will be observed for an hour. Then, you will be allowed to go home. When you return home after the test, do the following to help ensure a smooth recovery:   Rest when you get home. Ask your doctor if you can resume your normal diet. In most cases, you will be able to. Sedatives can slow your reaction time. Do not drive or use machinery for the rest of the day. Avoid alcohol for the rest of the day. Be sure to follow your doctor's instructions . How Long Will It Take? Usually about 10-15 minutes     Will It Hurt? Most people do not feel anything during the test and will not remember the test.  After the test, your throat may be sore and you may feel bloated. Results   This test gives your doctor information about the health of your digestive system. The results can help to explain your symptoms. You and your doctor will talk about the results and your treatment plan.      Call Your Doctor   After the test, call your doctor if any of the following occurs:   Signs of infection, including fever and chills   Severe abdominal pain   Hard, swollen abdomen   Difficulty swallowing or breathing   Any change or increase in your original symptoms   Bloody or black tarry colored stools   Nausea and/or vomiting   Cough, shortness of breath, or chest pain   Bleeding     In case of emergency, call 911. Patient Information and Instructions for Colonoscopy         Definition of Colonoscopy   A colonoscopy is the visual exam of the rectum and colon (large intestine). The exam is done with a tool called a colonoscope. The colonoscope is a flexible tube with a tiny camera on the end. This instrument allows the doctor to view the inside of your rectum and colon. Sigmoidoscopy is a shorter scope that views only the last one third of the colon. Reasons for Colonoscopy   It is used to examine, diagnose, and treat problems in your large intestine. The procedure is most often done for the following reasons: To determine the cause of abdominal pain, rectal bleeding, or a change in bowel habits   To detect and treat colon cancer or colon polyps   To obtain tissue samples for testing   To stop intestinal bleeding   Monitor response to treatment if you have inflammatory bowel disease     Possible Complications   Complications are rare, but no procedure is completely free of risk. If you are planning to have a colonoscopy, your doctor will review a list of possible complications, which may include:   Bleeding   Reaction to the sedation causing drop in your blood pressure or problems breathing  Perforation or puncture of the bowel     Factors that may increase the risk of complications include:   Pre-existing heart or kidney condition   Treatment with certain medicines, including aspirin and other drugs with anticoagulant or blood-thinning properties   Prior abdominal surgery or radiation treatments   Active colitis , diverticulitis , or other acute bowel disease   Previous treatment with radiation therapy     Be sure to discuss these risks with your doctor before the procedure.      What to Expect   Prior to Procedure   Your doctor will likely do the following:   Physical exam   Health history   Review of medicines   Test your stool for hidden blood (called \"occult blood\")     Your colon must be completely clean before the procedure. Any stool left in the intestine will block the view. This preparation may start several days before the procedure. Follow your doctor's instructions. Leading up to your procedure:   Talk to your doctor about your medicines. You may be asked to stop taking some medicines up to one week before the procedure, like:   Anti-inflammatory drugs (e.g., aspirin )   Blood thinners like clopidogrel (Plavix) or warfarin (Coumadin)   Iron supplements or vitamins containing iron   The day or days before your procedure, go on a clear liquid diet (clear broth, clear juice, clear jello) with no red coloring  Do not eat or drink anything after midnight. Wear comfortable clothing. If you have diabetes, ask your doctor if you need to adjust your diabetes medicine on the day prior to your procedure and the day of your procedure. Arrange for a ride home after the procedure. Anesthesia   You will receive intravenous sedation medicine for the procedure so you will not feel anything during the procedure. Description of the Procedure   You will lie on your left side with knees bent and drawn up toward your chest. The colonoscope will be slowly inserted through the rectum and into the bowel. The colonoscope will inject air into the colon. A small attached video camera will allow the doctor to view the colon's lining on a screen. The doctor will continue guiding the tool through the bowel and assess the lining. A tissue sample or polyps may be removed during the procedure. How Long Will It Take? Usually it takes about 30 to 45 minutes     Will It Hurt? Most people do not feel anything during the procedure and will not remember the procedure. After the procedure, gas pains and cramping are common. These pains should go away with the passing of gas. Post-procedure Care   If any tissue was removed:    It will be sent to a lab to be examined. It may take 1-2 weeks for results. The doctor will usually give an initial report after the scope is removed. Other tests may be recommended. A small amount of bleeding may occur during the first few days after the procedure. When you return home after the procedure, be sure to follow your doctor's instructions, which may include:   Resume medicines as instructed by your doctor. Resume normal diet, unless directed otherwise by your doctor. The sedative will make you drowsy. Avoid driving, operating machinery, or making important decisions for the rest of the day. Rest for the remainder of the day. After arriving home, contact your doctor if any of the following occurs:   Bleeding from your rectum, notify your doctor if you pass a teaspoonful of blood or more. Black, tarry stools   Severe abdominal pain   Hard, swollen abdomen   Signs of infection, including fever or chills   Inability to pass gas or stool   Coughing, shortness of breath, chest pain, severe nausea or vomiting     In case of an emergency, CALL 911 .

## 2021-04-19 NOTE — PROGRESS NOTES
[facility]  HISTORY & PHYSICAL  ATTENDING NOTE    Chief Complaint   Patient presents with    Consultation     Duodenal ulcer(ref by Dr. Ike Martinez)    Nausea & Vomiting     Pt denies    Abdominal Pain     Pt denies    Gastroesophageal Reflux     Pt states has heartburn after eats or drinks.  EGD     Last EGD 1/10/2017 by      GERD: Diony complains of heartburn. This has been associated with abdominal bloating, cough, fullness after meals, heartburn and nocturnal burning. She denies chest pain, choking on food, difficulty swallowing, dysphagia, hematemesis, hoarseness, laryngitis, melena, epigastric pain, shortness of breath, unexpected weight loss and water brash. Symptoms have been present for 1 month. She denies dysphagia. She has not lost weight. She denies melena, hematochezia, hematemesis, and coffee ground emesis. Medical therapy in the past has included aloe vera juice. Family history of colon cancer:  76 y.o. female denies weight loss, diarrhea, constipation, melena, hematochezia, N/V, abdominal pain. she denies family history of Crohn's disease or colitis. Father was diagnosed with colon cancer at age 41y  Last colonoscopy was 2016    Past Medical History:   Diagnosis Date    Diverticulosis of large intestine with hemorrhage 2/28/2016    Ulcer        Past Surgical History:   Procedure Laterality Date    ANKLE FRACTURE SURGERY Left 1982    Fall    COLONOSCOPY  2/28/16    Dr. Chalo Vigil, COLON, DIAGNOSTIC      FINGER AMPUTATION Left 2007    Tip of middle finger removed after door injury    TIBIA FRACTURE SURGERY Left 1982    Fall    UPPER GASTROINTESTINAL ENDOSCOPY  2/28/16    Dr. Beltran December       Family History   Problem Relation Age of Onset    Substance Abuse Child        No Known Allergies    Social History     Tobacco Use    Smoking status: Never Smoker    Smokeless tobacco: Never Used   Substance Use Topics    Alcohol use:  Yes Frequency: 2-3 times a week     Drinks per session: 1 or 2     Comment: occasioinally    Drug use: No       Current Outpatient Medications   Medication Sig Dispense Refill    loratadine (CLARITIN) 10 MG tablet Take 1 tablet by mouth daily 30 tablet 3    fluticasone (FLONASE) 50 MCG/ACT nasal spray 2 sprays by Each Nostril route daily 3 Bottle 1    pantoprazole (PROTONIX) 40 MG tablet Take 1 tablet by mouth daily (with breakfast) 30 tablet 3    citalopram (CELEXA) 10 MG tablet Take 1 tablet by mouth daily 30 tablet 0    polyethylene glycol (GLYCOLAX) powder Take 17 g by mouth daily as needed       No current facility-administered medications for this visit. Review of Systems   Constitutional: Negative. Negative for activity change, appetite change and unexpected weight change. HENT: Negative. Eyes: Negative. Respiratory: Negative. Negative for cough and shortness of breath. Cardiovascular: Positive for chest pain. Negative for leg swelling. Gastrointestinal: Positive for abdominal distention. Negative for abdominal pain, anal bleeding, blood in stool, constipation, diarrhea, nausea and vomiting. Endocrine: Negative. Genitourinary: Negative. Musculoskeletal: Negative. Negative for arthralgias, back pain, gait problem, joint swelling and myalgias. Skin: Negative. Allergic/Immunologic: Negative. Neurological: Negative. Negative for dizziness, weakness and headaches. Hematological: Negative. Psychiatric/Behavioral: Negative. Negative for confusion, decreased concentration and sleep disturbance. /77 (Site: Right Upper Arm, Position: Sitting, Cuff Size: Medium Adult)   Pulse 78   Temp 98.1 °F (36.7 °C) (Temporal)   Resp 14   Ht 5' 8\" (1.727 m)   Wt 244 lb (110.7 kg)   SpO2 96%   BMI 37.10 kg/m²   Physical Exam  Constitutional:       Appearance: She is obese. HENT:      Head: Normocephalic and atraumatic.       Nose: Nose normal.      Mouth/Throat: Mouth: Mucous membranes are moist.      Pharynx: Oropharynx is clear. Eyes:      Extraocular Movements: Extraocular movements intact. Pupils: Pupils are equal, round, and reactive to light. Neck:      Musculoskeletal: Normal range of motion and neck supple. Cardiovascular:      Rate and Rhythm: Normal rate and regular rhythm. Pulses: Normal pulses. Heart sounds: Normal heart sounds. Pulmonary:      Effort: Pulmonary effort is normal.      Breath sounds: Normal breath sounds. Abdominal:      General: There is no distension. Palpations: Abdomen is soft. Tenderness: There is abdominal tenderness (LLQ TTP). Musculoskeletal:         General: No tenderness or signs of injury. Skin:     General: Skin is warm and dry. Neurological:      General: No focal deficit present. Mental Status: She is alert and oriented to person, place, and time. Psychiatric:         Mood and Affect: Mood normal.         Behavior: Behavior normal.         Thought Content: Thought content normal.         Judgment: Judgment normal.           ASSESSMENT/PLAN:  1.  family history of colon cancer in a first degree relative  -- plan for colonoscopy  2. GERD, h/o duodenal ulcer--plan for EGD    Prep:  golytely    Colonoscopy. The patient was explained the risks/benefits/alternatives/expected outcomes of the procedure. The patient was explained the risks of the procedure, including, but not limited to, the risk of reaction to the anesthesia medicine and the risk of perforation requiring further surgery. The patient was informed that they may require biopsy or polypectomy. These procedures may increase the risk of complication. All questions were answered. The patient verbalized understanding and agreed to proceed. I recommended esophagogastroduodenoscopy with possible biopsy and explained the risk, benefits, expected outcome, and alternatives to the procedure.   Risks included but are not limited to bleeding, infection, respiratory distress, hypotension, and perforation of the esophagus, stomach, or duodenum. Patient understands and is in agreement.       Nora Menjivar MD, MSc, FACS  4/19/2021  9:55 AM

## 2021-04-21 ENCOUNTER — TELEPHONE (OUTPATIENT)
Dept: SURGERY | Age: 76
End: 2021-04-21

## 2021-04-21 NOTE — TELEPHONE ENCOUNTER
OREN Allen called and spoke to [facility] and scheduled PT for EGD & Colonoscopy on 6/2/2021 @ 11:30am with Dr. Adri Hein. PT denied taking any ASA/blood thinner products. PT verbalized she understood prep instructions, and NPO after midnight. PT verbalized that she understood appointment date/time, as well as to arrive 1 hours prior to procedure. PT advised PAT will call to go over all medication and advise on where to park and enter the hospital at for procedure. PT has been told to make sure they have a ride to and from procedure as they are not allowed to drive after procedure. PT procedure letter was mailed to them with all instructions also on it. MA instructed PT to call the office at [phone] with any questions, comments, or concerns about the procedure.        Electronically signed by Eliceo Florentino MA on 4/21/21 at 1:40 PM EDT

## 2021-04-28 ENCOUNTER — IMMUNIZATION (OUTPATIENT)
Dept: PRIMARY CARE CLINIC | Age: 76
End: 2021-04-28
Payer: MEDICARE

## 2021-04-28 PROCEDURE — 91300 COVID-19, PFIZER VACCINE 30MCG/0.3ML DOSE: CPT | Performed by: NURSE PRACTITIONER

## 2021-04-28 PROCEDURE — 0002A COVID-19, PFIZER VACCINE 30MCG/0.3ML DOSE: CPT | Performed by: NURSE PRACTITIONER

## 2021-05-03 ENCOUNTER — OFFICE VISIT (OUTPATIENT)
Dept: FAMILY MEDICINE CLINIC | Age: 76
End: 2021-05-03
Payer: MEDICARE

## 2021-05-03 VITALS
DIASTOLIC BLOOD PRESSURE: 59 MMHG | OXYGEN SATURATION: 95 % | WEIGHT: 240 LBS | BODY MASS INDEX: 36.37 KG/M2 | HEIGHT: 68 IN | HEART RATE: 64 BPM | SYSTOLIC BLOOD PRESSURE: 112 MMHG | TEMPERATURE: 97.5 F

## 2021-05-03 DIAGNOSIS — R05.9 COUGH: ICD-10-CM

## 2021-05-03 PROCEDURE — 1123F ACP DISCUSS/DSCN MKR DOCD: CPT | Performed by: STUDENT IN AN ORGANIZED HEALTH CARE EDUCATION/TRAINING PROGRAM

## 2021-05-03 PROCEDURE — 1090F PRES/ABSN URINE INCON ASSESS: CPT | Performed by: STUDENT IN AN ORGANIZED HEALTH CARE EDUCATION/TRAINING PROGRAM

## 2021-05-03 PROCEDURE — G8400 PT W/DXA NO RESULTS DOC: HCPCS | Performed by: STUDENT IN AN ORGANIZED HEALTH CARE EDUCATION/TRAINING PROGRAM

## 2021-05-03 PROCEDURE — 4040F PNEUMOC VAC/ADMIN/RCVD: CPT | Performed by: STUDENT IN AN ORGANIZED HEALTH CARE EDUCATION/TRAINING PROGRAM

## 2021-05-03 PROCEDURE — 1036F TOBACCO NON-USER: CPT | Performed by: STUDENT IN AN ORGANIZED HEALTH CARE EDUCATION/TRAINING PROGRAM

## 2021-05-03 PROCEDURE — 3017F COLORECTAL CA SCREEN DOC REV: CPT | Performed by: STUDENT IN AN ORGANIZED HEALTH CARE EDUCATION/TRAINING PROGRAM

## 2021-05-03 PROCEDURE — 99213 OFFICE O/P EST LOW 20 MIN: CPT | Performed by: STUDENT IN AN ORGANIZED HEALTH CARE EDUCATION/TRAINING PROGRAM

## 2021-05-03 PROCEDURE — 99212 OFFICE O/P EST SF 10 MIN: CPT | Performed by: STUDENT IN AN ORGANIZED HEALTH CARE EDUCATION/TRAINING PROGRAM

## 2021-05-03 PROCEDURE — G8427 DOCREV CUR MEDS BY ELIG CLIN: HCPCS | Performed by: STUDENT IN AN ORGANIZED HEALTH CARE EDUCATION/TRAINING PROGRAM

## 2021-05-03 PROCEDURE — G8417 CALC BMI ABV UP PARAM F/U: HCPCS | Performed by: STUDENT IN AN ORGANIZED HEALTH CARE EDUCATION/TRAINING PROGRAM

## 2021-05-03 RX ORDER — PANTOPRAZOLE SODIUM 40 MG/1
40 TABLET, DELAYED RELEASE ORAL
Qty: 30 TABLET | Refills: 3 | Status: SHIPPED | OUTPATIENT
Start: 2021-05-03

## 2021-05-24 NOTE — PROGRESS NOTES
Patient has been vaccinated for covid virus, agrees to bring her covid immunization record day of procedure.

## 2021-06-01 ENCOUNTER — PREP FOR PROCEDURE (OUTPATIENT)
Dept: SURGERY | Age: 76
End: 2021-06-01

## 2021-06-01 RX ORDER — SODIUM CHLORIDE 9 MG/ML
25 INJECTION, SOLUTION INTRAVENOUS PRN
Status: CANCELLED | OUTPATIENT
Start: 2021-06-01

## 2021-06-01 RX ORDER — SODIUM CHLORIDE 0.9 % (FLUSH) 0.9 %
5-40 SYRINGE (ML) INJECTION PRN
Status: CANCELLED | OUTPATIENT
Start: 2021-06-01

## 2021-06-01 RX ORDER — SODIUM CHLORIDE 0.9 % (FLUSH) 0.9 %
5-40 SYRINGE (ML) INJECTION EVERY 12 HOURS SCHEDULED
Status: CANCELLED | OUTPATIENT
Start: 2021-06-01

## 2021-06-01 RX ORDER — SODIUM CHLORIDE 9 MG/ML
INJECTION, SOLUTION INTRAVENOUS CONTINUOUS
Status: CANCELLED | OUTPATIENT
Start: 2021-06-01

## 2021-06-01 NOTE — H&P (VIEW-ONLY)
[place] [facility]  HISTORY & PHYSICAL  ATTENDING NOTE          Chief Complaint   Patient presents with    Consultation       Duodenal ulcer(ref by Dr. Rivera Torres)    Nausea & Vomiting       Pt denies    Abdominal Pain       Pt denies    Gastroesophageal Reflux       Pt states has heartburn after eats or drinks.  EGD       Last EGD 1/10/2017 by       GERD: Diony complains of heartburn. This has been associated with abdominal bloating, cough, fullness after meals, heartburn and nocturnal burning. She denies chest pain, choking on food, difficulty swallowing, dysphagia, hematemesis, hoarseness, laryngitis, melena, epigastric pain, shortness of breath, unexpected weight loss and water brash. Symptoms have been present for 1 month. She denies dysphagia. She has not lost weight. She denies melena, hematochezia, hematemesis, and coffee ground emesis. Medical therapy in the past has included aloe vera juice.     Family history of colon cancer:  76 y.o. female denies weight loss, diarrhea, constipation, melena, hematochezia, N/V, abdominal pain. she denies family history of Crohn's disease or colitis.     Father was diagnosed with colon cancer at age 41y  Last colonoscopy was 2016     Past Medical History        Past Medical History:   Diagnosis Date    Diverticulosis of large intestine with hemorrhage 2/28/2016    Ulcer              Past Surgical History         Past Surgical History:   Procedure Laterality Date    ANKLE FRACTURE SURGERY Left 1982     Fall    COLONOSCOPY   2/28/16     Dr. Lisa Cota, COLON, DIAGNOSTIC        FINGER AMPUTATION Left 2007     Tip of middle finger removed after door injury    TIBIA FRACTURE SURGERY Left 1982     Fall    UPPER GASTROINTESTINAL ENDOSCOPY   2/28/16     Dr. Tobias Smita            Family History         Family History   Problem Relation Age of Onset    Substance Abuse Child              No Known Allergies     Social History     Tobacco Use    Smoking status: Never Smoker    Smokeless tobacco: Never Used   Substance Use Topics    Alcohol use: Yes       Frequency: 2-3 times a week       Drinks per session: 1 or 2       Comment: occasioinally    Drug use: No         Current Facility-Administered Medications          Current Outpatient Medications   Medication Sig Dispense Refill    loratadine (CLARITIN) 10 MG tablet Take 1 tablet by mouth daily 30 tablet 3    fluticasone (FLONASE) 50 MCG/ACT nasal spray 2 sprays by Each Nostril route daily 3 Bottle 1    pantoprazole (PROTONIX) 40 MG tablet Take 1 tablet by mouth daily (with breakfast) 30 tablet 3    citalopram (CELEXA) 10 MG tablet Take 1 tablet by mouth daily 30 tablet 0    polyethylene glycol (GLYCOLAX) powder Take 17 g by mouth daily as needed          No current facility-administered medications for this visit.             Review of Systems   Constitutional: Negative. Negative for activity change, appetite change and unexpected weight change. HENT: Negative. Eyes: Negative. Respiratory: Negative. Negative for cough and shortness of breath. Cardiovascular: Positive for chest pain. Negative for leg swelling. Gastrointestinal: Positive for abdominal distention. Negative for abdominal pain, anal bleeding, blood in stool, constipation, diarrhea, nausea and vomiting. Endocrine: Negative. Genitourinary: Negative. Musculoskeletal: Negative. Negative for arthralgias, back pain, gait problem, joint swelling and myalgias. Skin: Negative. Allergic/Immunologic: Negative. Neurological: Negative. Negative for dizziness, weakness and headaches. Hematological: Negative. Psychiatric/Behavioral: Negative.   Negative for confusion, decreased concentration and sleep disturbance.         /77 (Site: Right Upper Arm, Position: Sitting, Cuff Size: Medium Adult)   Pulse 78   Temp 98.1 °F (36.7 °C) (Temporal)   Resp 14   Ht 5' 8\" (1.727 m)   Wt 244 lb (110.7 kg)   SpO2 96%   BMI 37.10 kg/m²   Physical Exam  Constitutional:       Appearance: She is obese. HENT:      Head: Normocephalic and atraumatic. Nose: Nose normal.      Mouth/Throat:      Mouth: Mucous membranes are moist.      Pharynx: Oropharynx is clear. Eyes:      Extraocular Movements: Extraocular movements intact. Pupils: Pupils are equal, round, and reactive to light. Neck:      Musculoskeletal: Normal range of motion and neck supple. Cardiovascular:      Rate and Rhythm: Normal rate and regular rhythm. Pulses: Normal pulses. Heart sounds: Normal heart sounds. Pulmonary:      Effort: Pulmonary effort is normal.      Breath sounds: Normal breath sounds. Abdominal:      General: There is no distension. Palpations: Abdomen is soft. Tenderness: There is abdominal tenderness (LLQ TTP). Musculoskeletal:         General: No tenderness or signs of injury. Skin:     General: Skin is warm and dry. Neurological:      General: No focal deficit present. Mental Status: She is alert and oriented to person, place, and time. Psychiatric:         Mood and Affect: Mood normal.         Behavior: Behavior normal.         Thought Content: Thought content normal.         Judgment: Judgment normal.               ASSESSMENT/PLAN:  1.  family history of colon cancer in a first degree relative  -- plan for colonoscopy  2. GERD, h/o duodenal ulcer--plan for EGD     Prep:  golytely     Colonoscopy. The patient was explained the risks/benefits/alternatives/expected outcomes of the procedure. The patient was explained the risks of the procedure, including, but not limited to, the risk of reaction to the anesthesia medicine and the risk of perforation requiring further surgery. The patient was informed that they may require biopsy or polypectomy. These procedures may increase the risk of complication. All questions were answered.   The patient verbalized understanding and agreed to proceed.     I recommended esophagogastroduodenoscopy with possible biopsy and explained the risk, benefits, expected outcome, and alternatives to the procedure. Risks included but are not limited to bleeding, infection, respiratory distress, hypotension, and perforation of the esophagus, stomach, or duodenum.   Patient understands and is in agreement.  Tawana Silveira MD, MSc, FACS  6/1/2021  10:20 AM

## 2021-06-01 NOTE — PROGRESS NOTES
Left message for Dahlia at Dr. Mtai Ann office patient scheduled for surgery 6/2 unable to reach patient. Please call if any questions or concerns.

## 2021-06-01 NOTE — PROGRESS NOTES
[address] PRE-ADMISSION TESTING GENERAL INSTRUCTIONS- [facility]-phone number:[phone]    GENERAL INSTRUCTIONS  [x] Antibacterial Soap shower Night before and/or AM of Surgery  [x] Follow bowel prep instructions per surgeon. No liquids/jello that are red or purple color. [x] Nothing by mouth after midnight, including gum, candy, mints, or water. [x] You may brush your teeth, gargle, but do NOT swallow water. [x]No smoking, chewing tobacco, illegal drugs, or alcohol within 24 hours of your surgery. [x] Jewelry, valuables or body piercing's should not be brought to the hospital. All body and/or tongue piercing's must be removed prior to arriving to hospital.  ALL hair pins must be removed. [x] Do not wear makeup, lotions, powders, deodorant. Nail polish as directed by the nurse. [x] Arrange transportation with a responsible adult  to and from the hospital. If you do not have a responsible adult  to transport you, you will need to make arrangements with a medical transportation company (i.e. [employer]. A Uber/taxi/bus is not appropriate unless you are accompanied by a responsible adult ). Arrange for someone to be with you for the remainder of the day and for 24 hours after your procedure due to having had anesthesia. Who will be your  for transportation? Estrellita Librado, spouse  Who will be staying with you for 24 hrs after your procedure? Estrellita Pavon, spouse  [x] zuuka! card and photo ID. [x] Transfusion Bracelet: Please bring with you to hospital, day of surgery    PARKING INSTRUCTIONS:   [x] Arrival Time: 10:30 am, you and your  will need to wear a mask. · [x] Parking lot '\"I\"  is located on [address] [facility] [address] (the corner of [facility] [facility]). To enter, press the button and the gate will lift. A free token will be provided to exit the lot. One car per patient is allowed to park in this lot.  All other cars are to park on [address]

## 2021-06-01 NOTE — PROGRESS NOTES
Left voice message for Dahlia at Dr. Betancourt [facility] office patient returned the call was able to go over information with patient and provide instructions.

## 2021-06-01 NOTE — H&P
[place] [facility]  HISTORY & PHYSICAL  ATTENDING NOTE          Chief Complaint   Patient presents with    Consultation       Duodenal ulcer(ref by Dr. Kody Ramirez)    Nausea & Vomiting       Pt denies    Abdominal Pain       Pt denies    Gastroesophageal Reflux       Pt states has heartburn after eats or drinks.  EGD       Last EGD 1/10/2017 by       GERD: Joslynt complains of heartburn. This has been associated with abdominal bloating, cough, fullness after meals, heartburn and nocturnal burning. She denies chest pain, choking on food, difficulty swallowing, dysphagia, hematemesis, hoarseness, laryngitis, melena, epigastric pain, shortness of breath, unexpected weight loss and water brash. Symptoms have been present for 1 month. She denies dysphagia. She has not lost weight. She denies melena, hematochezia, hematemesis, and coffee ground emesis. Medical therapy in the past has included aloe vera juice.     Family history of colon cancer:  76 y.o. female denies weight loss, diarrhea, constipation, melena, hematochezia, N/V, abdominal pain. she denies family history of Crohn's disease or colitis.     Father was diagnosed with colon cancer at age 41y  Last colonoscopy was 2016     Past Medical History        Past Medical History:   Diagnosis Date    Diverticulosis of large intestine with hemorrhage 2/28/2016    Ulcer              Past Surgical History         Past Surgical History:   Procedure Laterality Date    ANKLE FRACTURE SURGERY Left 1982     Fall    COLONOSCOPY   2/28/16     Dr. Jesu Mendiola, COLON, DIAGNOSTIC        FINGER AMPUTATION Left 2007     Tip of middle finger removed after door injury    TIBIA FRACTURE SURGERY Left 1982     Fall    UPPER GASTROINTESTINAL ENDOSCOPY   2/28/16     Dr. Tashi Stanton            Family History         Family History   Problem Relation Age of Onset    Substance Abuse Child              No Known Allergies     Social History (110.7 kg)   SpO2 96%   BMI 37.10 kg/m²   Physical Exam  Constitutional:       Appearance: She is obese. HENT:      Head: Normocephalic and atraumatic. Nose: Nose normal.      Mouth/Throat:      Mouth: Mucous membranes are moist.      Pharynx: Oropharynx is clear. Eyes:      Extraocular Movements: Extraocular movements intact. Pupils: Pupils are equal, round, and reactive to light. Neck:      Musculoskeletal: Normal range of motion and neck supple. Cardiovascular:      Rate and Rhythm: Normal rate and regular rhythm. Pulses: Normal pulses. Heart sounds: Normal heart sounds. Pulmonary:      Effort: Pulmonary effort is normal.      Breath sounds: Normal breath sounds. Abdominal:      General: There is no distension. Palpations: Abdomen is soft. Tenderness: There is abdominal tenderness (LLQ TTP). Musculoskeletal:         General: No tenderness or signs of injury. Skin:     General: Skin is warm and dry. Neurological:      General: No focal deficit present. Mental Status: She is alert and oriented to person, place, and time. Psychiatric:         Mood and Affect: Mood normal.         Behavior: Behavior normal.         Thought Content: Thought content normal.         Judgment: Judgment normal.               ASSESSMENT/PLAN:  1.  family history of colon cancer in a first degree relative  -- plan for colonoscopy  2. GERD, h/o duodenal ulcer--plan for EGD     Prep:  golytely     Colonoscopy. The patient was explained the risks/benefits/alternatives/expected outcomes of the procedure. The patient was explained the risks of the procedure, including, but not limited to, the risk of reaction to the anesthesia medicine and the risk of perforation requiring further surgery. The patient was informed that they may require biopsy or polypectomy. These procedures may increase the risk of complication. All questions were answered.   The patient verbalized understanding and

## 2021-06-02 ENCOUNTER — ANESTHESIA (OUTPATIENT)
Dept: ENDOSCOPY | Age: 76
End: 2021-06-02
Payer: MEDICARE

## 2021-06-02 ENCOUNTER — ANESTHESIA EVENT (OUTPATIENT)
Dept: ENDOSCOPY | Age: 76
End: 2021-06-02
Payer: MEDICARE

## 2021-06-02 ENCOUNTER — HOSPITAL ENCOUNTER (OUTPATIENT)
Age: 76
Setting detail: OUTPATIENT SURGERY
Discharge: HOME | End: 2021-06-02
Attending: SURGERY | Admitting: SURGERY
Payer: MEDICARE

## 2021-06-02 VITALS
TEMPERATURE: 97.3 F | OXYGEN SATURATION: 95 % | HEIGHT: 68 IN | WEIGHT: 240 LBS | RESPIRATION RATE: 16 BRPM | SYSTOLIC BLOOD PRESSURE: 142 MMHG | HEART RATE: 66 BPM | BODY MASS INDEX: 36.37 KG/M2 | DIASTOLIC BLOOD PRESSURE: 84 MMHG

## 2021-06-02 VITALS — SYSTOLIC BLOOD PRESSURE: 104 MMHG | DIASTOLIC BLOOD PRESSURE: 58 MMHG | OXYGEN SATURATION: 97 %

## 2021-06-02 PROCEDURE — 88342 IMHCHEM/IMCYTCHM 1ST ANTB: CPT

## 2021-06-02 PROCEDURE — 7100000011 HC PHASE II RECOVERY - ADDTL 15 MIN: Performed by: SURGERY

## 2021-06-02 PROCEDURE — 6360000002 HC RX W HCPCS

## 2021-06-02 PROCEDURE — 2580000003 HC RX 258

## 2021-06-02 PROCEDURE — 3700000001 HC ADD 15 MINUTES (ANESTHESIA): Performed by: SURGERY

## 2021-06-02 PROCEDURE — 3700000000 HC ANESTHESIA ATTENDED CARE: Performed by: SURGERY

## 2021-06-02 PROCEDURE — 88305 TISSUE EXAM BY PATHOLOGIST: CPT

## 2021-06-02 PROCEDURE — 7100000010 HC PHASE II RECOVERY - FIRST 15 MIN: Performed by: SURGERY

## 2021-06-02 PROCEDURE — G0105 COLORECTAL SCRN; HI RISK IND: HCPCS | Performed by: SURGERY

## 2021-06-02 PROCEDURE — 43239 EGD BIOPSY SINGLE/MULTIPLE: CPT | Performed by: SURGERY

## 2021-06-02 PROCEDURE — 2709999900 HC NON-CHARGEABLE SUPPLY: Performed by: SURGERY

## 2021-06-02 PROCEDURE — 3609027000 HC COLONOSCOPY: Performed by: SURGERY

## 2021-06-02 PROCEDURE — 3609012400 HC EGD TRANSORAL BIOPSY SINGLE/MULTIPLE: Performed by: SURGERY

## 2021-06-02 RX ORDER — PROPOFOL 10 MG/ML
INJECTION, EMULSION INTRAVENOUS PRN
Status: DISCONTINUED | OUTPATIENT
Start: 2021-06-02 | End: 2021-06-02 | Stop reason: SDUPTHER

## 2021-06-02 RX ORDER — SODIUM CHLORIDE 9 MG/ML
25 INJECTION, SOLUTION INTRAVENOUS PRN
Status: DISCONTINUED | OUTPATIENT
Start: 2021-06-02 | End: 2021-06-02 | Stop reason: HOSPADM

## 2021-06-02 RX ORDER — SODIUM CHLORIDE 0.9 % (FLUSH) 0.9 %
5-40 SYRINGE (ML) INJECTION EVERY 12 HOURS SCHEDULED
Status: DISCONTINUED | OUTPATIENT
Start: 2021-06-02 | End: 2021-06-02 | Stop reason: HOSPADM

## 2021-06-02 RX ORDER — SODIUM CHLORIDE 9 MG/ML
INJECTION, SOLUTION INTRAVENOUS CONTINUOUS PRN
Status: DISCONTINUED | OUTPATIENT
Start: 2021-06-02 | End: 2021-06-02 | Stop reason: SDUPTHER

## 2021-06-02 RX ORDER — SODIUM CHLORIDE 0.9 % (FLUSH) 0.9 %
5-40 SYRINGE (ML) INJECTION PRN
Status: DISCONTINUED | OUTPATIENT
Start: 2021-06-02 | End: 2021-06-02 | Stop reason: HOSPADM

## 2021-06-02 RX ORDER — LIDOCAINE HYDROCHLORIDE 20 MG/ML
INJECTION, SOLUTION EPIDURAL; INFILTRATION; INTRACAUDAL; PERINEURAL PRN
Status: DISCONTINUED | OUTPATIENT
Start: 2021-06-02 | End: 2021-06-02

## 2021-06-02 RX ORDER — LIDOCAINE HYDROCHLORIDE 20 MG/ML
INJECTION, SOLUTION INTRAVENOUS PRN
Status: DISCONTINUED | OUTPATIENT
Start: 2021-06-02 | End: 2021-06-02 | Stop reason: SDUPTHER

## 2021-06-02 RX ORDER — SODIUM CHLORIDE 9 MG/ML
INJECTION, SOLUTION INTRAVENOUS CONTINUOUS
Status: DISCONTINUED | OUTPATIENT
Start: 2021-06-02 | End: 2021-06-02 | Stop reason: HOSPADM

## 2021-06-02 RX ADMIN — SODIUM CHLORIDE: 9 INJECTION, SOLUTION INTRAVENOUS at 12:08

## 2021-06-02 RX ADMIN — LIDOCAINE HYDROCHLORIDE 50 MG: 20 INJECTION, SOLUTION INTRAVENOUS at 12:17

## 2021-06-02 RX ADMIN — PROPOFOL 400 MG: 10 INJECTION, EMULSION INTRAVENOUS at 12:17

## 2021-06-02 RX ADMIN — LIDOCAINE HYDROCHLORIDE 50 MG: 20 INJECTION, SOLUTION INTRAVENOUS at 12:33

## 2021-06-02 ASSESSMENT — PAIN SCALES - GENERAL
PAINLEVEL_OUTOF10: 0

## 2021-06-02 ASSESSMENT — PAIN - FUNCTIONAL ASSESSMENT: PAIN_FUNCTIONAL_ASSESSMENT: 0-10

## 2021-06-02 NOTE — OP NOTE
[facility]  ENDOSCOPY LAB  UPPER ENDOSCOPY REPORT      DATE OF PROCEDURE: 6/2/2021     PREOPERATIVE DIAGNOSIS: GERD, h/o duodenal ulcer    POSTOPERATIVE DIAGNOSIS/FINDINGS: same    SURGEON: Amarilis Monreal MD    ASSISTANT: 2cm hiatal hernia    OPERATION: Esophagogastroduodenoscopy with biopsies    ANESTHESIA: Local monitored anesthesia. COMPLICATIONS: None. EBL:  5cc    SPECIMENS:  Antral biopsies x 4    PRIOR TO EXAM: Gen: comfortable, no distress, awake and alert; Lungs: Clear;  Heart:regular rate and rhythm, normal S1S2     BRIEF HISTORY:  This is a 76 y.o. female who presents with the complaint of GERD and h/o duodenal ulcer. My recommendation is to proceed with esophagogastroduodenoscopy. The patient was advised of the risks, benefits, complications and options including the risk of bleeding and perforation. The patient understood and agreed to proceed. PROCEDURE:  [facility] anesthesia, the patient was positioned in the left side down decubitus position. A bite block was inserted. Under direct visualization the scope was passed through the oral cavity, into the esophagus and then into the stomach. The scope was then passed  into the duodenum.  Findings are as follows:    Duodenum: normal    Antrum/pylorus:  Normal, cold forceps biopsies taken    Gastric body: normal    Gastric fundus/cardia: normal    Esophagus: normal    GEJ:  2cm sliding hiatal hernia; @40cm    THE PATIENT TOLERATED THE PROCEDURE WELL      PLAN:  C/w protonix  F/u biopsies      Amarilis Monreal MD  6/2/2021  12:47 PM

## 2021-06-02 NOTE — ANESTHESIA PRE PROCEDURE
Department of Anesthesiology  Preprocedure Note       Name:  Homa Buckner   Age:  76 y.o.  :  1945                                          MRN:  [id number]         Date:  2021      Surgeon: Melissa Godwin):  Mac Sheehan MD    Procedure: Procedure(s):  EGD ESOPHAGOGASTRODUODENOSCOPY  COLORECTAL CANCER SCREENING, HIGH RISK    Medications prior to admission:   Prior to Admission medications    Medication Sig Start Date End Date Taking?  Authorizing Provider   pantoprazole (PROTONIX) 40 MG tablet Take 1 tablet by mouth daily (with breakfast)  Patient taking differently: Take 40 mg by mouth nightly as needed  5/3/21  Yes Blayne Griffin MD   loratadine (CLARITIN) 10 MG tablet Take 1 tablet by mouth daily  Patient taking differently: Take 10 mg by mouth daily as needed  3/25/21  Yes Blayne Griffin MD   polyethylene glycol (GLYCOLAX) powder Take 17 g by mouth daily as needed   Yes Historical Provider, MD       Current medications:    Current Facility-Administered Medications   Medication Dose Route Frequency Provider Last Rate Last Admin    0.9 % sodium chloride infusion   Intravenous Continuous Mac Sheehan MD        0.9 % sodium chloride infusion  25 mL Intravenous PRN Mac Sheehan MD        sodium chloride flush 0.9 % injection 5-40 mL  5-40 mL Intravenous 2 times per day Mac Sheehan MD        sodium chloride flush 0.9 % injection 5-40 mL  5-40 mL Intravenous PRN Mac Sheehan MD           Allergies:  No Known Allergies    Problem List:    Patient Active Problem List   Diagnosis Code    Gastrointestinal hemorrhage with melena K92.1    Acute blood loss anemia D62    Family history of colon cancer Z80.0    Diverticulosis of large intestine with hemorrhage K57.31    Vitamin D deficiency E55.9    Duodenal ulcer K26.9    Hepatic abscess K75.0    Nausea and vomiting R11.2    Diarrhea of presumed infectious origin R19.7    Weight loss R63.4       Past Medical History: 02/01/2021    CREATININE 1.1 02/01/2021    GFRAA 59 02/01/2021    LABGLOM 48 02/01/2021    GLUCOSE 90 02/01/2021    PROT 7.1 02/01/2021    CALCIUM 9.4 02/01/2021    BILITOT 0.8 02/01/2021    ALKPHOS 103 02/01/2021    AST 18 02/01/2021    ALT 15 02/01/2021       POC Tests: No results for input(s): POCGLU, POCNA, POCK, POCCL, POCBUN, POCHEMO, POCHCT in the last 72 hours. Coags:   Lab Results   Component Value Date    PROTIME 13.9 03/05/2017    INR 1.3 03/05/2017    APTT 29.6 02/28/2016       HCG (If Applicable): No results found for: PREGTESTUR, PREGSERUM, HCG, HCGQUANT     ABGs: No results found for: PHART, PO2ART, KKI0ACX, OAA2ONO, BEART, S0TOSUMY     Type & Screen (If Applicable):  No results found for: LABABO, LABRH    Drug/Infectious Status (If Applicable):  No results found for: HIV, HEPCAB    COVID-19 Screening (If Applicable): No results found for: COVID19        Anesthesia Evaluation  Patient summary reviewed no history of anesthetic complications:   Airway: Mallampati: II  TM distance: <3 FB     Mouth opening: > = 3 FB Dental:          Pulmonary: breath sounds clear to auscultation                            ROS comment: Probable MATTIE - never had sleep study   Cardiovascular:Negative CV ROS  Exercise tolerance: good (>4 METS),           Rhythm: regular  Rate: normal                    Neuro/Psych:   Negative Neuro/Psych ROS              GI/Hepatic/Renal:   (+) GERD:, PUD,          ROS comment: Per notes --> \"complains of heartburn. This has been associated with abdominal bloating, cough, fullness after meals, heartburn and nocturnal burning. \".   Endo/Other: Negative Endo/Other ROS                    Abdominal:   (+) obese,         Vascular: negative vascular ROS. Anesthesia Plan      MAC     ASA 2     (Backup GA if needed)  Induction: intravenous. Anesthetic plan and risks discussed with patient. Plan discussed with CRNA.                   Zen Hendrix MD 6/2/2021

## 2021-06-02 NOTE — OP NOTE
[facility]  PROCEDURE NOTE    DATE OF PROCEDURE: 6/2/2021    SURGEON: Lexie Rush MD    ASSISTANT: None    PREOPERATIVE DIAGNOSIS: High risk colorectal cancer screening with history of colon cancer in a first degree relative    POSTOPERATIVE DIAGNOSIS: severe pandiverticulosis    OPERATION: Total colonoscopy     ANESTHESIA: Local monitored anesthesia. ESTIMATED BLOOD LOSS (ml): none    COMPLICATIONS: None    SPECIMENS:  Was Not Obtained    HISTORY: The patient is a 76y.o. year old female with history of above preop diagnosis. I recommended colonoscopy with possible biopsy or polypectomy and I explained the risk, benefits, expected outcome, and alternatives to the procedure. Risks included but are not limited to bleeding, infection, respiratory distress, hypotension, and perforation of the colon. The patient understands and is in agreement. PROCEDURE: The patient was given IV conscious sedation per anesthesia. The patient was given supplemental oxygen by nasal cannula. The colonoscope was inserted per rectum and advanced under direct vision to the cecum without difficulty. The prep was good so exam was adequate. BBPS:  3+3+1= 7    FINDINGS:  Cecum/Ascending colon: appendiceal orifice identified, ileocecal valve identified, unable to intubate TI, retroflexion not performed; Severe diverticulosis    Transverse colon: diverticulosis    Descending/Sigmoid colon: severe diverticulosis    Rectum/Anus: examined in normal and retroflexed positions and was normal    The colon was decompressed and the scope was removed. The withdraw time was approximately 6 minutes. The patient tolerated the procedure well. ASSESSMENT/PLAN:   1. Diverticulosis--high fiber diet and fiber supplements  2.  Recommend follow up colonoscopy in 5 years due to family history, if in good health at age [de-identified]    Electronically signed by Lexie Rush MD on 6/2/21 at 12:44 PM EDT

## 2021-06-02 NOTE — INTERVAL H&P NOTE
Update History & Physical    The patient's History and Physical of June 1, 2021 was reviewed with the patient and I examined the patient. There was no change. The surgical site was confirmed by the patient and me. Plan: The risks, benefits, expected outcome, and alternative to the recommended procedure have been discussed with the patient. Patient understands and wants to proceed with the procedure.      Electronically signed by Steven Coyle MD on 6/2/2021 at 12:10 PM

## 2021-06-03 NOTE — ANESTHESIA POSTPROCEDURE EVALUATION
Department of Anesthesiology  Postprocedure Note    Patient: Sara Nieves  MRN: [id number]  YOB: 1945  Date of evaluation: 6/2/2021  Time:  10:16 PM     Procedure Summary     Date: 06/02/21 Room / Location: [address] [place] [address] / [facility]    Anesthesia Start: 6644 Anesthesia Stop: 2519    Procedures:       EGD BIOPSY (N/A )      COLORECTAL CANCER SCREENING, HIGH RISK (N/A ) Diagnosis: (GERD, FAMILY HX OF COLON CANCER)    Surgeons: Juju Robert MD Responsible Provider: Chio Gray MD    Anesthesia Type: MAC ASA Status: 2          Anesthesia Type: MAC    Caden Phase I: Caden Score: 10    Caden Phase II: Caden Score: 10    Last vitals: Reviewed and per EMR flowsheets.        Anesthesia Post Evaluation    Patient location during evaluation: PACU  Patient participation: complete - patient participated  Level of consciousness: awake  Airway patency: patent  Nausea & Vomiting: no vomiting and no nausea  Complications: no  Cardiovascular status: hemodynamically stable  Respiratory status: acceptable  Hydration status: stable

## 2021-06-15 ENCOUNTER — OFFICE VISIT (OUTPATIENT)
Dept: FAMILY MEDICINE CLINIC | Age: 76
End: 2021-06-15
Payer: MEDICARE

## 2021-06-15 VITALS
RESPIRATION RATE: 16 BRPM | HEART RATE: 74 BPM | DIASTOLIC BLOOD PRESSURE: 73 MMHG | BODY MASS INDEX: 35.16 KG/M2 | OXYGEN SATURATION: 99 % | WEIGHT: 232 LBS | HEIGHT: 68 IN | SYSTOLIC BLOOD PRESSURE: 110 MMHG

## 2021-06-15 DIAGNOSIS — Z00.00 ROUTINE GENERAL MEDICAL EXAMINATION AT A HEALTH CARE FACILITY: ICD-10-CM

## 2021-06-15 DIAGNOSIS — M54.31 BILATERAL SCIATICA: ICD-10-CM

## 2021-06-15 DIAGNOSIS — M54.32 BILATERAL SCIATICA: ICD-10-CM

## 2021-06-15 DIAGNOSIS — Z71.89 ACP (ADVANCE CARE PLANNING): Primary | ICD-10-CM

## 2021-06-15 DIAGNOSIS — F33.41 RECURRENT MAJOR DEPRESSIVE DISORDER, IN PARTIAL REMISSION (HCC): ICD-10-CM

## 2021-06-15 PROCEDURE — 4040F PNEUMOC VAC/ADMIN/RCVD: CPT | Performed by: STUDENT IN AN ORGANIZED HEALTH CARE EDUCATION/TRAINING PROGRAM

## 2021-06-15 PROCEDURE — 99212 OFFICE O/P EST SF 10 MIN: CPT | Performed by: STUDENT IN AN ORGANIZED HEALTH CARE EDUCATION/TRAINING PROGRAM

## 2021-06-15 PROCEDURE — G0438 PPPS, INITIAL VISIT: HCPCS | Performed by: STUDENT IN AN ORGANIZED HEALTH CARE EDUCATION/TRAINING PROGRAM

## 2021-06-15 PROCEDURE — 1123F ACP DISCUSS/DSCN MKR DOCD: CPT | Performed by: STUDENT IN AN ORGANIZED HEALTH CARE EDUCATION/TRAINING PROGRAM

## 2021-06-15 PROCEDURE — 3017F COLORECTAL CA SCREEN DOC REV: CPT | Performed by: STUDENT IN AN ORGANIZED HEALTH CARE EDUCATION/TRAINING PROGRAM

## 2021-06-15 ASSESSMENT — PATIENT HEALTH QUESTIONNAIRE - PHQ9
SUM OF ALL RESPONSES TO PHQ QUESTIONS 1-9: 1
SUM OF ALL RESPONSES TO PHQ QUESTIONS 1-9: 1
SUM OF ALL RESPONSES TO PHQ9 QUESTIONS 1 & 2: 1
SUM OF ALL RESPONSES TO PHQ QUESTIONS 1-9: 1
1. LITTLE INTEREST OR PLEASURE IN DOING THINGS: 0
2. FEELING DOWN, DEPRESSED OR HOPELESS: 1

## 2021-06-15 ASSESSMENT — LIFESTYLE VARIABLES
HOW OFTEN DURING THE LAST YEAR HAVE YOU BEEN UNABLE TO REMEMBER WHAT HAPPENED THE NIGHT BEFORE BECAUSE YOU HAD BEEN DRINKING: 0
HOW OFTEN DURING THE LAST YEAR HAVE YOU NEEDED AN ALCOHOLIC DRINK FIRST THING IN THE MORNING TO GET YOURSELF GOING AFTER A NIGHT OF HEAVY DRINKING: 0
HOW MANY STANDARD DRINKS CONTAINING ALCOHOL DO YOU HAVE ON A TYPICAL DAY: 0
HAVE YOU OR SOMEONE ELSE BEEN INJURED AS A RESULT OF YOUR DRINKING: 0
AUDIT-C TOTAL SCORE: 4
HAS A RELATIVE, FRIEND, DOCTOR, OR ANOTHER HEALTH PROFESSIONAL EXPRESSED CONCERN ABOUT YOUR DRINKING OR SUGGESTED YOU CUT DOWN: 0
HOW OFTEN DURING THE LAST YEAR HAVE YOU FOUND THAT YOU WERE NOT ABLE TO STOP DRINKING ONCE YOU HAD STARTED: 0
HOW OFTEN DO YOU HAVE SIX OR MORE DRINKS ON ONE OCCASION: 2
HOW OFTEN DURING THE LAST YEAR HAVE YOU FAILED TO DO WHAT WAS NORMALLY EXPECTED FROM YOU BECAUSE OF DRINKING: 0
AUDIT TOTAL SCORE: 4
HOW OFTEN DURING THE LAST YEAR HAVE YOU HAD A FEELING OF GUILT OR REMORSE AFTER DRINKING: 0
HOW OFTEN DO YOU HAVE A DRINK CONTAINING ALCOHOL: 2

## 2021-06-15 NOTE — PATIENT INSTRUCTIONS
Personalized Preventive Plan for Deniz Simon - 6/15/2021  Medicare offers a range of preventive health benefits. Some of the tests and screenings are paid in full while other may be subject to a deductible, co-insurance, and/or copay. Some of these benefits include a comprehensive review of your medical history including lifestyle, illnesses that may run in your family, and various assessments and screenings as appropriate. After reviewing your medical record and screening and assessments performed today your provider may have ordered immunizations, labs, imaging, and/or referrals for you. A list of these orders (if applicable) as well as your Preventive Care list are included within your After Visit Summary for your review. Other Preventive Recommendations:    · A preventive eye exam performed by an eye specialist is recommended every 1-2 years to screen for glaucoma; cataracts, macular degeneration, and other eye disorders. · A preventive dental visit is recommended every 6 months. · Try to get at least 150 minutes of exercise per week or 10,000 steps per day on a pedometer . · Order or download the FREE \"Exercise & Physical Activity: Your Everyday Guide\" from [employer]. Call 4-929.711.7713 or search [employer] online. · You need 4630-4773 mg of calcium and 4013-9934 IU of vitamin D per day. It is possible to meet your calcium requirement with diet alone, but a vitamin D supplement is usually necessary to meet this goal.  · When exposed to the sun, use a sunscreen that protects against both UVA and UVB radiation with an SPF of 30 or greater. Reapply every 2 to 3 hours or after sweating, drying off with a towel, or swimming. · Always wear a seat belt when traveling in a car. Always wear a helmet when riding a bicycle or motorcycle. Personalized Preventive Plan for Deniz Simon - 6/15/2021  Medicare offers a range of preventive health benefits.  Some

## 2021-06-15 NOTE — PROGRESS NOTES
Attending Physician Statement    S:   Chief Complaint   Patient presents with   [employer] AWV      AWV. No complaints except sciatica  O: Blood pressure 110/73, pulse 74, resp. rate 16, height 5' 8\" (1.727 m), weight 232 lb (105.2 kg), SpO2 99 %. Exam:   Heart - RRR   Lungs - clear  A: As above  P:  Refer for PT   Recommend DEXA   Follow-up as ordered    I have discussed the case, including pertinent history and exam findings with the resident. I agree with the documented assessment and plan.

## 2021-06-15 NOTE — PROGRESS NOTES
providers/suppliers regularly involved in providing care):   Patient Care Team:  Carlos Huertas MD as PCP - General (Family Medicine)    Wt Readings from Last 3 Encounters:   06/15/21 232 lb (105.2 kg)   06/02/21 240 lb (108.9 kg)   05/03/21 240 lb (108.9 kg)     Vitals:    06/15/21 1307   BP: 110/73   Site: Right Upper Arm   Position: Sitting   Cuff Size: Medium Adult   Pulse: 74   Resp: 16   SpO2: 99%   Weight: 232 lb (105.2 kg)   Height: 5' 8\" (1.727 m)     Body mass index is 35.28 kg/m². Based upon direct observation of the patient, evaluation of cognition reveals recent and remote memory intact. Patient's complete Health Risk Assessment and screening values have been reviewed and are found in Flowsheets. The following problems were reviewed today and where indicated follow up appointments were made and/or referrals ordered. Positive Risk Factor Screenings with Interventions:            General Health and ACP:  General  In general, how would you say your health is?: Very Good  In the past 7 days, have you experienced any of the following?  New or Increased Pain, New or Increased Fatigue, Loneliness, Social Isolation, Stress or Anger?: (!) Stress, Anger  Do you get the social and emotional support that you need?: Yes  Do you have a Living Will?: (!) No  Advance Directives     Power of  Living Will ACP-Advance Directive ACP-Power of     Not on File Not on File Not on File Not on File      General Health Risk Interventions:  · Stress: patient's comments regarding reasons for stress and/or anger: her vacation got cancelled  · Anger: patient's comments regarding reasons for stress and/or anger: her vacation got cancelled    Health Habits/Nutrition:  Health Habits/Nutrition  Do you exercise for at least 20 minutes 2-3 times per week?: (!) No  Have you lost any weight without trying in the past 3 months?: No  Do you eat only one meal per day?: No  Have you seen the dentist within the past year?: (!) No  Body mass index: (!) 35.27  Health Habits/Nutrition Interventions:  · Inadequate physical activity:  patient agrees to exercise for at least 150 minutes/week  · Dental exam overdue:  patient encouraged to make appointment with his/her dentist    Hearing/Vision:  No exam data present  Hearing/Vision  Do you or your family notice any trouble with your hearing that hasn't been managed with hearing aids?: No  Do you have difficulty driving, watching TV, or doing any of your daily activities because of your eyesight?: No  Have you had an eye exam within the past year?: (!) No  Hearing/Vision Interventions:  · Vision concerns:  patient encouraged to make appointment with his/her eye specialist    Safety:  Safety  Do you have working smoke detectors?: (!) No  Have all throw rugs been removed or fastened?: Yes  Do you have non-slip mats or surfaces in all bathtubs/showers?: (!) No  Do all of your stairways have a railing or banister?: Yes  Are your doorways, halls and stairs free of clutter?: Yes  Do you always fasten your seatbelt when you are in a car?: Yes  Safety Interventions:  · Home safety tips provided     Personalized Preventive Plan   Current Health Maintenance Status  Immunization History   Administered Date(s) Administered    COVID-19, Guillen Peter, PF, 30mcg/0.3mL 03/28/2021, 04/28/2021    Pneumococcal Conjugate 13-valent ([id number]) 04/19/2016    Pneumococcal Polysaccharide ([id number]) 05/30/2017    Tdap (Boostrix, Adacel) 10/05/2016        Health Maintenance   Topic Date Due    Shingles Vaccine (1 of 2) Never done    DEXA (modify frequency per FRAX score)  Never done   [employer] Visit (AWV)  Never done    Flu vaccine (Season Ended) 09/01/2021    Lipid screen  02/01/2026    Colon cancer screen colonoscopy  06/02/2026    DTaP/Tdap/Td vaccine (2 - Td or Tdap) 10/05/2026    Pneumococcal 65+ years Vaccine  Completed    COVID-19 Vaccine  Completed    Hepatitis C screen  Completed   

## 2021-06-16 ENCOUNTER — CLINICAL DOCUMENTATION (OUTPATIENT)
Dept: SPIRITUAL SERVICES | Age: 76
End: 2021-06-16

## 2021-06-16 NOTE — ACP (ADVANCE CARE PLANNING)
Advance Care Planning    Ambulatory ACP Specialist Patient Outreach    Date:  6/15/2021  ACP Specialist:  Edd Adan    Outreach call to patient in follow-up to ACP Specialist referral from:  [x] PCP  [] Provider   [] Ambulatory Care Management [] Other for Reason:    [x] Continued Conversation for ACP decision making / Goals of Care  [] Code Status Discussion  [] Completion of Adv Directive  [] Completion of Portable DNR order  [] Other (Specify)    Date Referral Received: 6/15/21    Today's Outreach:  [x] First   [] Second  [] Third                               Third outreach made by []  phone  [] email []   [employer]     Intervention:  [x] Spoke with Patient  [] Left VM requesting return call      Outcome:I contacted Filiberto Gonzalez today and updated her decision makers. I am mailing living will and POA documents for her to review. I will continue to follow up. Next Step:   [] ACP scheduled conversation  [] Outreach again in one week               [] Email / Mail ACP Info Sheets  [x] Email / Mail Advance Directive            [] Close Referral. Routing closure to referring provider/staff and to ACP Specialist .      Thank you for this referral.

## 2021-07-28 NOTE — ACP (ADVANCE CARE PLANNING)
Advance Care Planning    Ambulatory ACP Specialist Patient Outreach    Date:  6/16/2021  ACP Specialist:  Russ Washington    Outreach call to patient in follow-up to ACP Specialist referral from: Jeff Hall MD    [x] PCP  [] Provider   [] Ambulatory Care Management [] Other for Reason:    [x] Advance Directive Assistance  [] Code Status Discussion  [] Complete Portable DNR Order  [] Discuss Goals of Care  [] Complete POST/MOST  [] Early ACP Decision-Making  [] Other    Date Referral Received: 6/15/21    Today's Outreach:  [] First   [x] Second  [] Third                               Third outreach made by []  phone  [] email []   [employer]     Intervention:  [] Spoke with Patient  [x] Left VM requesting return call      Outcome: Left voice message with my contact information. Next Step:   [] ACP scheduled conversation  [] Outreach again in one week               [] Email / Mail ACP Info Sheets  [] Email / Mail Advance Directive            [] Close Referral. Routing closure to referring provider/staff and to ACP Specialist .      Thank you for this referral.
Advance Care Planning   Ambulatory ACP Specialist Patient Outreach    Date:  6/16/2021  ACP Specialist:  Akua Valladares    Outreach call to patient in follow-up to ACP Specialist referral from: Grace Haile MD    [x] PCP  [] Provider   [] Ambulatory Care Management [] Other for Reason:    [x] Advance Directive Assistance  [] Code Status Discussion  [] Complete Portable DNR Order  [] Discuss Goals of Care  [] Complete POST/MOST  [] Early ACP Decision-Making  [] Other    Date Referral Received: 6/16/21    Today's Outreach:  [] First   [] Second  [x] Third                               Third outreach made by []  phone  [] email []   [employer]     Intervention:  [x] Spoke with Patient  [] Left VM requesting return call      Outcome: Scheduled appointment to come in complete living will/POA on 28th @ 10:30. Next Step:   [x] ACP scheduled conversation  [] Outreach again in one week               [] Email / Mail ACP Info Sheets  [] Email / Mail Advance Directive            [] Close Referral. Routing closure to referring provider/staff and to ACP Specialist .      Thank you for this referral.
yes       [x] Yes   [] No   Educated Patient / Alfonso Moreira regarding differences between Advance Directives and portable DNR orders.     Length of ACP Conversation in minutes:  61    Conversation Outcomes:  [x] ACP discussion completed  [] Existing advance directive reviewed with patient; no changes to patient's previously recorded wishes  [x] New Advance Directive completed  [] Portable Do Not Rescitate prepared for Provider review and signature  [] POLST/POST/MOLST/MOST prepared for Provider review and signature      Follow-up plan:    [] Schedule follow-up conversation to continue planning  [] Referred individual to Provider for additional questions/concerns   [x] Advised patient/agent/surrogate to review completed ACP document and update if needed with changes in condition, patient preferences or care setting    [x] This note routed to one or more involved healthcare providers

## 2021-09-14 ENCOUNTER — OFFICE VISIT (OUTPATIENT)
Dept: ENT CLINIC | Age: 76
End: 2021-09-14
Payer: MEDICARE

## 2021-09-14 VITALS — HEIGHT: 68 IN | BODY MASS INDEX: 34.86 KG/M2 | TEMPERATURE: 97.2 F | WEIGHT: 230 LBS

## 2021-09-14 DIAGNOSIS — Q17.8 SPLIT EAR LOBE: Primary | ICD-10-CM

## 2021-09-14 PROCEDURE — 99204 OFFICE O/P NEW MOD 45 MIN: CPT | Performed by: OTOLARYNGOLOGY

## 2021-09-14 PROCEDURE — 1036F TOBACCO NON-USER: CPT | Performed by: OTOLARYNGOLOGY

## 2021-09-14 PROCEDURE — G8427 DOCREV CUR MEDS BY ELIG CLIN: HCPCS | Performed by: OTOLARYNGOLOGY

## 2021-09-14 PROCEDURE — 1090F PRES/ABSN URINE INCON ASSESS: CPT | Performed by: OTOLARYNGOLOGY

## 2021-09-14 PROCEDURE — 4040F PNEUMOC VAC/ADMIN/RCVD: CPT | Performed by: OTOLARYNGOLOGY

## 2021-09-14 PROCEDURE — G8417 CALC BMI ABV UP PARAM F/U: HCPCS | Performed by: OTOLARYNGOLOGY

## 2021-09-14 PROCEDURE — 1123F ACP DISCUSS/DSCN MKR DOCD: CPT | Performed by: OTOLARYNGOLOGY

## 2021-09-14 PROCEDURE — G8400 PT W/DXA NO RESULTS DOC: HCPCS | Performed by: OTOLARYNGOLOGY

## 2021-09-14 PROCEDURE — 3017F COLORECTAL CA SCREEN DOC REV: CPT | Performed by: OTOLARYNGOLOGY

## 2021-09-14 ASSESSMENT — ENCOUNTER SYMPTOMS
EYES NEGATIVE: 1
COLOR CHANGE: 0
APNEA: 0
GASTROINTESTINAL NEGATIVE: 1
EYE PAIN: 0
ABDOMINAL PAIN: 0
DIARRHEA: 0
CHEST TIGHTNESS: 0
SHORTNESS OF BREATH: 0
VOMITING: 0
EYE DISCHARGE: 0
RESPIRATORY NEGATIVE: 1

## 2021-09-14 NOTE — PROGRESS NOTES
Subjective:      Patient ID:  Amanuel Ag is a 76 y.o. female. HPI:    Patient presents today for split ear lobe on the right side. Condition has been present for 6 month(s). Her left ear lobe split several years ago and was repaired by another ENT. The right side split about 6 months ago after her earring came through the tissue. She denies drainage, bleeding or infection. Patient's medications, allergies, past medical, surgical, social and family histories were reviewed and updated as appropriate. Review of Systems   Constitutional: Negative. Negative for appetite change. HENT: Negative for ear discharge and ear pain. Eyes: Negative. Negative for pain, discharge and visual disturbance. Respiratory: Negative. Negative for apnea, chest tightness and shortness of breath. Cardiovascular: Negative. Negative for chest pain, palpitations and leg swelling. Gastrointestinal: Negative. Negative for abdominal pain, diarrhea and vomiting. Endocrine: Negative for cold intolerance, heat intolerance and polydipsia. Genitourinary: Negative. Negative for dysuria, flank pain and hematuria. Musculoskeletal: Negative. Negative for arthralgias, gait problem and neck pain. Skin: Negative. Negative for color change, pallor and rash. Allergic/Immunologic: Negative for environmental allergies, food allergies and immunocompromised state. Neurological: Negative. Negative for dizziness, numbness and headaches. Hematological: Negative for adenopathy. Psychiatric/Behavioral: Negative. Negative for behavioral problems and hallucinations. All other systems reviewed and are negative. Objective:   Physical Exam  Constitutional:       General: She is not in acute distress. HENT:      Head: Normocephalic and atraumatic.       Right Ear: Tympanic membrane and ear canal normal.      Left Ear: Tympanic membrane and ear canal normal.      Ears:      Comments: Right ear lobule is split Nose: Nose normal.      Mouth/Throat:      Mouth: Mucous membranes are moist.      Pharynx: No oropharyngeal exudate. Eyes:      Extraocular Movements: Extraocular movements intact. Pupils: Pupils are equal, round, and reactive to light. Cardiovascular:      Rate and Rhythm: Normal rate. Pulmonary:      Effort: Pulmonary effort is normal.   Musculoskeletal:         General: Normal range of motion. Cervical back: Normal range of motion and neck supple. Skin:     General: Skin is warm and dry. Neurological:      General: No focal deficit present. Mental Status: She is alert and oriented to person, place, and time. Psychiatric:         Mood and Affect: Mood normal.         Behavior: Behavior normal.                 Assessment:       Diagnosis Orders   1. Split ear lobe                Plan:      - I recommend return for in office procedure. Will set her up for in office complex repair of right ear lobe split. Electronically signed by Emiliano Falcon DO on 9/14/2021 at 2:04 PM                           Victoria Mark  1945    I have discussed the case, including pertinent history and exam findings with the resident. I have seen and examined the patient and the key elements of the encounter have been performed by me. I agree with the assessment, plan and orders as documented by the  resident              Remainder of medical problems as per  resident note. Patient seen and examined. Agree with above exam, assessment and plan.       Electronically signed by Nyasia Franklin DO on 9/14/21 at 5:25 PM EDT

## 2021-09-24 ENCOUNTER — PROCEDURE VISIT (OUTPATIENT)
Dept: ENT CLINIC | Age: 76
End: 2021-09-24
Payer: MEDICARE

## 2021-09-24 VITALS
SYSTOLIC BLOOD PRESSURE: 132 MMHG | HEART RATE: 73 BPM | BODY MASS INDEX: 34.86 KG/M2 | WEIGHT: 230 LBS | TEMPERATURE: 97.2 F | OXYGEN SATURATION: 98 % | HEIGHT: 68 IN | DIASTOLIC BLOOD PRESSURE: 74 MMHG

## 2021-09-24 DIAGNOSIS — Q17.8 SPLIT EAR LOBE: Primary | ICD-10-CM

## 2021-09-24 PROCEDURE — 12051 INTMD RPR FACE/MM 2.5 CM/<: CPT | Performed by: OTOLARYNGOLOGY

## 2021-09-24 NOTE — PROGRESS NOTES
Procedure:  Split earlobe removal  Patient was placed in a chair in a reclining position. Bilateral ears were then injected with 1% lidocaine with epinephrine 2 mL. The anesthesia was given 10 minutes to take effect. Right ear   a #15 blade was then used to cut around in the earlobe split. Once the wedge of the earlobe was removed, chromic suture 4-0 was used to close in two layers. There was minimal bleeding. A Band-Aid was then placed over the incision.

## 2021-10-01 ENCOUNTER — OFFICE VISIT (OUTPATIENT)
Dept: ENT CLINIC | Age: 76
End: 2021-10-01

## 2021-10-01 VITALS
HEIGHT: 68 IN | TEMPERATURE: 97.2 F | HEART RATE: 69 BPM | SYSTOLIC BLOOD PRESSURE: 126 MMHG | WEIGHT: 230 LBS | DIASTOLIC BLOOD PRESSURE: 78 MMHG | OXYGEN SATURATION: 97 % | BODY MASS INDEX: 34.86 KG/M2

## 2021-10-01 DIAGNOSIS — Q17.8 SPLIT EAR LOBE: Primary | ICD-10-CM

## 2021-10-01 PROCEDURE — 1123F ACP DISCUSS/DSCN MKR DOCD: CPT | Performed by: OTOLARYNGOLOGY

## 2021-10-01 PROCEDURE — G8427 DOCREV CUR MEDS BY ELIG CLIN: HCPCS | Performed by: OTOLARYNGOLOGY

## 2021-10-01 PROCEDURE — 4040F PNEUMOC VAC/ADMIN/RCVD: CPT | Performed by: OTOLARYNGOLOGY

## 2021-10-01 PROCEDURE — 99212 OFFICE O/P EST SF 10 MIN: CPT | Performed by: OTOLARYNGOLOGY

## 2021-10-01 PROCEDURE — 1036F TOBACCO NON-USER: CPT | Performed by: OTOLARYNGOLOGY

## 2021-10-01 PROCEDURE — 1090F PRES/ABSN URINE INCON ASSESS: CPT | Performed by: OTOLARYNGOLOGY

## 2021-10-01 PROCEDURE — G8417 CALC BMI ABV UP PARAM F/U: HCPCS | Performed by: OTOLARYNGOLOGY

## 2021-10-01 PROCEDURE — G8484 FLU IMMUNIZE NO ADMIN: HCPCS | Performed by: OTOLARYNGOLOGY

## 2021-10-01 PROCEDURE — G8400 PT W/DXA NO RESULTS DOC: HCPCS | Performed by: OTOLARYNGOLOGY

## 2021-10-01 RX ORDER — LANOLIN ALCOHOL/MO/W.PET/CERES
1000 CREAM (GRAM) TOPICAL DAILY
COMMUNITY

## 2021-10-01 NOTE — PROGRESS NOTES
Subjective:      Patient ID:  Rudy Burns is a 68 y.o. female. HPI:    Patient presents today for one week post op for right split ear lobe repair. Patient doing well no complaints. Patient's medications, allergies, past medical, surgical, social and family histories were reviewed and updated as appropriate. Review of Systems   Constitutional: Negative for chills and fever. HENT: Negative for ear discharge and ear pain. All other systems reviewed and are negative. Objective:   Physical Exam  Constitutional:       General: She is not in acute distress. Appearance: Normal appearance. HENT:      Head: Normocephalic and atraumatic. Right Ear: Ear canal normal.      Left Ear: Ear canal normal.      Ears:      Comments: Right ear lobe with sutures in place no drainage or dehiscence     Nose: Nose normal.      Mouth/Throat:      Mouth: Mucous membranes are moist.      Pharynx: No oropharyngeal exudate. Eyes:      Extraocular Movements: Extraocular movements intact. Pupils: Pupils are equal, round, and reactive to light. Cardiovascular:      Rate and Rhythm: Normal rate. Pulmonary:      Effort: Pulmonary effort is normal.   Musculoskeletal:         General: Normal range of motion. Cervical back: Normal range of motion and neck supple. Skin:     General: Skin is warm and dry. Neurological:      General: No focal deficit present. Mental Status: She is alert and oriented to person, place, and time. Psychiatric:         Mood and Affect: Mood normal.         Behavior: Behavior normal.                 Assessment:       Diagnosis Orders   1.  Split ear lobe                Plan:      - removed sutures in the office today  - advised to keep placing ointment to the area    Follow up in one month    Electronically signed by Hayden Quezada DO on 10/1/2021 at 11:37 AM                           Rudy Burns  1945    I have discussed the case, including

## 2021-11-05 ENCOUNTER — OFFICE VISIT (OUTPATIENT)
Dept: ENT CLINIC | Age: 76
End: 2021-11-05
Payer: MEDICARE

## 2021-11-05 VITALS
TEMPERATURE: 97.3 F | DIASTOLIC BLOOD PRESSURE: 71 MMHG | OXYGEN SATURATION: 97 % | WEIGHT: 229 LBS | HEART RATE: 72 BPM | BODY MASS INDEX: 34.71 KG/M2 | HEIGHT: 68 IN | SYSTOLIC BLOOD PRESSURE: 111 MMHG

## 2021-11-05 DIAGNOSIS — Q17.8 SPLIT EAR LOBE: Primary | ICD-10-CM

## 2021-11-05 PROCEDURE — G8417 CALC BMI ABV UP PARAM F/U: HCPCS | Performed by: OTOLARYNGOLOGY

## 2021-11-05 PROCEDURE — 99213 OFFICE O/P EST LOW 20 MIN: CPT | Performed by: OTOLARYNGOLOGY

## 2021-11-05 PROCEDURE — 1036F TOBACCO NON-USER: CPT | Performed by: OTOLARYNGOLOGY

## 2021-11-05 PROCEDURE — 1090F PRES/ABSN URINE INCON ASSESS: CPT | Performed by: OTOLARYNGOLOGY

## 2021-11-05 PROCEDURE — 1123F ACP DISCUSS/DSCN MKR DOCD: CPT | Performed by: OTOLARYNGOLOGY

## 2021-11-05 PROCEDURE — G8484 FLU IMMUNIZE NO ADMIN: HCPCS | Performed by: OTOLARYNGOLOGY

## 2021-11-05 PROCEDURE — G8427 DOCREV CUR MEDS BY ELIG CLIN: HCPCS | Performed by: OTOLARYNGOLOGY

## 2021-11-05 PROCEDURE — G8400 PT W/DXA NO RESULTS DOC: HCPCS | Performed by: OTOLARYNGOLOGY

## 2021-11-05 PROCEDURE — 4040F PNEUMOC VAC/ADMIN/RCVD: CPT | Performed by: OTOLARYNGOLOGY

## 2021-11-05 ASSESSMENT — ENCOUNTER SYMPTOMS
BACK PAIN: 0
SORE THROAT: 0
SHORTNESS OF BREATH: 0
VOMITING: 0
NAUSEA: 0
RHINORRHEA: 0
ABDOMINAL PAIN: 0
EYE PAIN: 0

## 2021-11-05 NOTE — PROGRESS NOTES
Subjective:      PatientID:  Sahil Danielle is a 68 y.o. female. HPI:    Patient presents today for a recheck of  right auricular laceration repair. Pt was treated 1 month(s) ago. There has not been re openning of the right ear lobe laceration. Mild pruritis, otherwise overall improved        Past Medical History:   Diagnosis Date    Diverticulosis of large intestine with hemorrhage 2/28/2016    Ulcer      Past Surgical History:   Procedure Laterality Date    ANKLE FRACTURE SURGERY Left 1982    Fall    COLONOSCOPY  2/28/16    Dr. Brady Nelson COLONOSCOPY N/A 6/2/2021    COLORECTAL CANCER SCREENING, HIGH RISK performed by Bekah Thurston MD at [facility] ENDOSCOPY    ENDOSCOPY, COLON, DIAGNOSTIC      FINGER AMPUTATION Left 2007    Tip of middle finger removed after door injury   [facility] Left 1982    Fall    UPPER GASTROINTESTINAL ENDOSCOPY  2/28/16    Dr. Justin Iron N/A 6/2/2021    EGD BIOPSY performed by Bekah Thurston MD at [address] [place] [address] Road History   Problem Relation Age of Onset    Substance Abuse Child     Diabetes Mother     Cancer Father 43        colon cancer     Social History     Socioeconomic History    Marital status:      Spouse name: None    Number of children: None    Years of education: None    Highest education level: None   Occupational History    None   Tobacco Use    Smoking status: Never Smoker    Smokeless tobacco: Never Used   Vaping Use    Vaping Use: Never used   Substance and Sexual Activity    Alcohol use: Not Currently     Alcohol/week: 5.0 standard drinks     Types: 2 Cans of beer, 3 Shots of liquor per week    Drug use: Never    Sexual activity: Yes     Partners: Male   Other Topics Concern    None   Social History Narrative    Live with . No pets. Smoke detectors wears seat belts.      Social Determinants of Health     Financial Resource Strain:     Difficulty of Paying Living Expenses: Not on file   Food Insecurity:     Worried About [address] [employer] in the Last Year: Not on file    [place] of Food in the Last Year: Not on file   Transportation Needs:     Lack of Transportation (Medical): Not on file    Lack of Transportation (Non-Medical): Not on file   Physical Activity:     Days of Exercise per Week: Not on file    Minutes of Exercise per Session: Not on file   Stress:     Feeling of Stress : Not on file   Social Connections:     Frequency of Communication with Friends and Family: Not on file    Frequency of Social Gatherings with Friends and Family: Not on file    Attends Mosque Services: Not on file    Active Member of [address] [place] [address] or Organizations: Not on file    Attends Club or Organization Meetings: Not on file    Marital Status: Not on file   Intimate Partner Violence:     Fear of Current or Ex-Partner: Not on file    Emotionally Abused: Not on file    Physically Abused: Not on file    Sexually Abused: Not on file   Housing Stability:     Unable to Pay for Housing in the Last Year: Not on file    Number of [place] in the Last Year: Not on file    Unstable Housing in the Last Year: Not on file     No Known Allergies        Review of Systems   Constitutional: Negative for chills and fever. HENT: Negative for congestion, ear pain, rhinorrhea and sore throat. Eyes: Negative for pain. Respiratory: Negative for shortness of breath. Cardiovascular: Negative for chest pain. Gastrointestinal: Negative for abdominal pain, nausea and vomiting. Genitourinary: Negative for flank pain and pelvic pain. Musculoskeletal: Negative for back pain and neck pain. Skin: Negative for rash. Neurological: Negative for headaches. Objective:     Vitals:    11/05/21 0952   BP: 111/71   Pulse: 72   Temp: 97.3 °F (36.3 °C)   SpO2: 97%     Physical Exam  Vitals and nursing note reviewed. Constitutional:       General: She is not in acute distress.      Appearance: She is well-developed and normal weight. She is not ill-appearing. HENT:      Head: Normocephalic and atraumatic. Right Ear: External ear normal.      Left Ear: External ear normal.      Ears:        Nose: Nose normal.      Mouth/Throat:      Mouth: Mucous membranes are moist.      Pharynx: Oropharynx is clear. Eyes:      Pupils: Pupils are equal, round, and reactive to light. Cardiovascular:      Rate and Rhythm: Normal rate and regular rhythm. Heart sounds: Normal heart sounds. Pulmonary:      Effort: Pulmonary effort is normal. No respiratory distress. Breath sounds: Normal breath sounds. Abdominal:      Palpations: Abdomen is soft. Tenderness: There is no abdominal tenderness. Musculoskeletal:         General: No swelling or tenderness. Cervical back: Normal range of motion and neck supple. Skin:     General: Skin is warm and dry. Findings: No rash. Neurological:      Mental Status: She is alert and oriented to person, place, and time. Cranial Nerves: No cranial nerve deficit. Assessment:       Diagnosis Orders   1. Split ear lobe                Plan:      Pt healed well no further followup  Call or return to clinic prn if these symptoms worsen or fail to improve as anticipated.     Follow up prn

## 2022-04-14 ENCOUNTER — OFFICE VISIT (OUTPATIENT)
Dept: FAMILY MEDICINE CLINIC | Age: 77
End: 2022-04-14
Payer: MEDICARE

## 2022-04-14 VITALS
DIASTOLIC BLOOD PRESSURE: 76 MMHG | TEMPERATURE: 97.2 F | OXYGEN SATURATION: 97 % | BODY MASS INDEX: 34.56 KG/M2 | SYSTOLIC BLOOD PRESSURE: 125 MMHG | RESPIRATION RATE: 18 BRPM | HEART RATE: 80 BPM | WEIGHT: 228 LBS | HEIGHT: 68 IN

## 2022-04-14 DIAGNOSIS — M54.9 CHRONIC BACK PAIN, UNSPECIFIED BACK LOCATION, UNSPECIFIED BACK PAIN LATERALITY: ICD-10-CM

## 2022-04-14 DIAGNOSIS — M81.0 OSTEOPOROSIS, UNSPECIFIED OSTEOPOROSIS TYPE, UNSPECIFIED PATHOLOGICAL FRACTURE PRESENCE: ICD-10-CM

## 2022-04-14 DIAGNOSIS — Z13.820 OSTEOPOROSIS SCREENING: Primary | ICD-10-CM

## 2022-04-14 DIAGNOSIS — G89.29 CHRONIC BACK PAIN, UNSPECIFIED BACK LOCATION, UNSPECIFIED BACK PAIN LATERALITY: ICD-10-CM

## 2022-04-14 PROCEDURE — G8427 DOCREV CUR MEDS BY ELIG CLIN: HCPCS | Performed by: FAMILY MEDICINE

## 2022-04-14 PROCEDURE — 1123F ACP DISCUSS/DSCN MKR DOCD: CPT | Performed by: FAMILY MEDICINE

## 2022-04-14 PROCEDURE — 1036F TOBACCO NON-USER: CPT | Performed by: FAMILY MEDICINE

## 2022-04-14 PROCEDURE — G8400 PT W/DXA NO RESULTS DOC: HCPCS | Performed by: FAMILY MEDICINE

## 2022-04-14 PROCEDURE — 99212 OFFICE O/P EST SF 10 MIN: CPT | Performed by: FAMILY MEDICINE

## 2022-04-14 PROCEDURE — 4040F PNEUMOC VAC/ADMIN/RCVD: CPT | Performed by: FAMILY MEDICINE

## 2022-04-14 PROCEDURE — 99213 OFFICE O/P EST LOW 20 MIN: CPT | Performed by: FAMILY MEDICINE

## 2022-04-14 PROCEDURE — 1090F PRES/ABSN URINE INCON ASSESS: CPT | Performed by: FAMILY MEDICINE

## 2022-04-14 PROCEDURE — G8417 CALC BMI ABV UP PARAM F/U: HCPCS | Performed by: FAMILY MEDICINE

## 2022-04-14 RX ORDER — GABAPENTIN 100 MG/1
100 CAPSULE ORAL NIGHTLY
Qty: 30 CAPSULE | Refills: 0 | Status: CANCELLED | OUTPATIENT
Start: 2022-04-14 | End: 2022-05-14

## 2022-04-14 RX ORDER — NAPROXEN 500 MG/1
500 TABLET ORAL 2 TIMES DAILY PRN
Qty: 60 TABLET | Refills: 0 | Status: SHIPPED
Start: 2022-04-14 | End: 2022-06-14 | Stop reason: SDUPTHER

## 2022-04-14 SDOH — ECONOMIC STABILITY: FOOD INSECURITY: WITHIN THE PAST 12 MONTHS, YOU WORRIED THAT YOUR FOOD WOULD RUN OUT BEFORE YOU GOT MONEY TO BUY MORE.: NEVER TRUE

## 2022-04-14 SDOH — ECONOMIC STABILITY: TRANSPORTATION INSECURITY
IN THE PAST 12 MONTHS, HAS THE LACK OF TRANSPORTATION KEPT YOU FROM MEDICAL APPOINTMENTS OR FROM GETTING MEDICATIONS?: NO

## 2022-04-14 SDOH — ECONOMIC STABILITY: FOOD INSECURITY: WITHIN THE PAST 12 MONTHS, THE FOOD YOU BOUGHT JUST DIDN'T LAST AND YOU DIDN'T HAVE MONEY TO GET MORE.: NEVER TRUE

## 2022-04-14 SDOH — ECONOMIC STABILITY: TRANSPORTATION INSECURITY
IN THE PAST 12 MONTHS, HAS LACK OF TRANSPORTATION KEPT YOU FROM MEETINGS, WORK, OR FROM GETTING THINGS NEEDED FOR DAILY LIVING?: NO

## 2022-04-14 ASSESSMENT — PATIENT HEALTH QUESTIONNAIRE - PHQ9
SUM OF ALL RESPONSES TO PHQ QUESTIONS 1-9: 0
SUM OF ALL RESPONSES TO PHQ QUESTIONS 1-9: 0
2. FEELING DOWN, DEPRESSED OR HOPELESS: 0
SUM OF ALL RESPONSES TO PHQ QUESTIONS 1-9: 0
SUM OF ALL RESPONSES TO PHQ9 QUESTIONS 1 & 2: 0
SUM OF ALL RESPONSES TO PHQ QUESTIONS 1-9: 0
1. LITTLE INTEREST OR PLEASURE IN DOING THINGS: 0

## 2022-04-14 ASSESSMENT — LIFESTYLE VARIABLES
HOW OFTEN DO YOU HAVE A DRINK CONTAINING ALCOHOL: 2-4 TIMES A MONTH
HOW MANY STANDARD DRINKS CONTAINING ALCOHOL DO YOU HAVE ON A TYPICAL DAY: 1 OR 2

## 2022-04-14 ASSESSMENT — SOCIAL DETERMINANTS OF HEALTH (SDOH): HOW HARD IS IT FOR YOU TO PAY FOR THE VERY BASICS LIKE FOOD, HOUSING, MEDICAL CARE, AND HEATING?: NOT HARD AT ALL

## 2022-04-14 NOTE — PROGRESS NOTES
Attending Physician Statement    S:   Chief Complaint   Patient presents with    Check-Up     back pain      Lower back pain for five years, gradually worsening. Mobic helped in past.  Worse with standing and walking. No B/B symptoms, weakness, fevers, weight loss. Some radiation into left leg  O: Blood pressure 125/76, pulse 80, temperature 97.2 °F (36.2 °C), temperature source Temporal, resp. rate 18, height 5' 8\" (1.727 m), weight 228 lb (103.4 kg), SpO2 97 %. Exam:   Heart - RRR   Lungs - clear   Back - no tenderness,  Negative SLR. Good ROM  A: chronic low back pain  P:  PT, naproxen   DEXA   Follow-up as ordered    I have discussed the case, including pertinent history and exam findings with the resident. I agree with the documented assessment and plan.

## 2022-04-15 ENCOUNTER — TELEPHONE (OUTPATIENT)
Dept: FAMILY MEDICINE CLINIC | Age: 77
End: 2022-04-15

## 2022-04-15 NOTE — TELEPHONE ENCOUNTER
The patient called to report the Naproxen     - the  Pharmacy told her its from the [address] [place] [address]- and it affects her ulcer.   Can a new prescription for Meloxicam which she has used successfully in the past    Please advise if you can send Meloxicam for patient

## 2022-04-15 NOTE — PROGRESS NOTES
[facility]  Department of Family Medicine  Family Medicine Residency Program      Patient:  Edmund Mcguire 68 y.o. female     Date of Service: 4/14/22      Chiefcomplaint:   Chief Complaint   Patient presents with    Check-Up     back pain         History of Present Illness     This is a 66-year-old female in office regarding chronic low back pain. Patient notes this pain is present for approximately 5 years. She does not recall a precipitating event for this back pain. Has been gradually worsening over the past several years. No recent assault, trauma, injuries to the area. No recent MVC's. Patient notes standing and walking worsen the pain. It is relieved with resting and sitting still. No systemic symptoms such as fevers, chills, weakness in lower extremities, abnormal weight changes. There is some radiation down the left lateral leg. Allergies:    Patient has no known allergies. Medication List:    Current Outpatient Medications   Medication Sig Dispense Refill    naproxen (NAPROSYN) 500 MG tablet Take 1 tablet by mouth 2 times daily as needed for Pain 60 tablet 0    vitamin B-12 (CYANOCOBALAMIN) 1000 MCG tablet Take 1,000 mcg by mouth daily      pantoprazole (PROTONIX) 40 MG tablet Take 1 tablet by mouth daily (with breakfast) (Patient taking differently: Take 40 mg by mouth nightly as needed ) 30 tablet 3    loratadine (CLARITIN) 10 MG tablet Take 1 tablet by mouth daily (Patient taking differently: Take 10 mg by mouth daily as needed ) 30 tablet 3    polyethylene glycol (GLYCOLAX) powder Take 17 g by mouth daily as needed       No current facility-administered medications for this visit. Physical Exam   Physical Exam  Constitutional:       Appearance: She is well-developed. HENT:      Head: Normocephalic.       Right Ear: External ear normal.      Left Ear: External ear normal.   Eyes:      Conjunctiva/sclera: Conjunctivae normal.      Pupils: Pupils are equal, round, and reactive to light. Cardiovascular:      Rate and Rhythm: Normal rate and regular rhythm. Heart sounds: Normal heart sounds. No murmur heard. Pulmonary:      Effort: Pulmonary effort is normal. No respiratory distress. Breath sounds: Normal breath sounds. No wheezing or rales. Abdominal:      General: There is no distension. Palpations: Abdomen is soft. Tenderness: There is no abdominal tenderness. There is no guarding or rebound. Musculoskeletal:         General: Normal range of motion. Cervical back: Normal range of motion. Comments: Negative straight leg raise bilaterally  Full ROM of bilateral legs, knees. Skin:     General: Skin is warm. Findings: No erythema. Neurological:      Mental Status: She is alert and oriented to person, place, and time. Psychiatric:         Behavior: Behavior normal.         Vitals:    04/14/22 1110   BP: 125/76   Pulse: 80   Resp: 18   Temp: 97.2 °F (36.2 °C)   SpO2: 97%         Assessment and Plan     1. Chronic low back pain  - Likely secondary to degenerative joint changes versus osteoarthritis versus persistent muscles strain/spasm.  - We will place referrals to physical therapy. - As needed Tylenol for back pain. Use NSAIDs, naproxen with caution given history of PUD.     2.  Health maintenance  - DEXA scan ordered      RTO 1 month  Case discussed with Dr. Kentrell Goode

## 2022-04-19 NOTE — TELEPHONE ENCOUNTER
Please send Meloxicam for patient   She did not  the naproxen due to not being able to take it    Please advise

## 2022-04-20 RX ORDER — MELOXICAM 7.5 MG/1
7.5 TABLET ORAL DAILY
Qty: 14 TABLET | Refills: 0 | Status: SHIPPED
Start: 2022-04-20 | End: 2022-10-15 | Stop reason: ALTCHOICE

## 2022-05-02 ENCOUNTER — HOSPITAL ENCOUNTER (OUTPATIENT)
Dept: PHYSICAL THERAPY | Age: 77
Setting detail: THERAPIES SERIES
Discharge: HOME | End: 2022-05-02
Payer: MEDICARE

## 2022-05-02 PROCEDURE — 97161 PT EVAL LOW COMPLEX 20 MIN: CPT | Performed by: PHYSICAL THERAPIST

## 2022-05-02 ASSESSMENT — PAIN DESCRIPTION - PAIN TYPE: TYPE: CHRONIC PAIN

## 2022-05-02 ASSESSMENT — PAIN DESCRIPTION - ORIENTATION: ORIENTATION: RIGHT;LEFT

## 2022-05-02 ASSESSMENT — PAIN - FUNCTIONAL ASSESSMENT: PAIN_FUNCTIONAL_ASSESSMENT: PREVENTS OR INTERFERES WITH MANY ACTIVE NOT PASSIVE ACTIVITIES

## 2022-05-02 ASSESSMENT — PAIN DESCRIPTION - LOCATION: LOCATION: BACK;LEG

## 2022-05-02 ASSESSMENT — PAIN SCALES - GENERAL: PAINLEVEL_OUTOF10: 10

## 2022-05-02 ASSESSMENT — PAIN DESCRIPTION - DESCRIPTORS: DESCRIPTORS: ACHING;CRAMPING

## 2022-05-02 NOTE — PROGRESS NOTES
[facility]                Phone: [phone]   Fax: [phone]    Physical Therapy Daily Treatment Note  Date:  2022    Patient Name:  Bina Cuevas    :  1945  MRN: [id number]    Evaluating therapist:  EVITA Shah                    (22)  Restrictions/Precautions:    Diagnosis:  chronic LBP  Treatment Diagnosis:    Insurance/Certification information:  Medicare               cert dates:    to  22                     ICD-10:  M54.9  Referring Physician:  Mini Hoang of care signed (Y/N):  Y  Visit# / total visits:    Pain level: 10/10   Time In:  Time Out:    Subjective:      Exercises:  Exercise/Equipment Resistance/Repetitions Other comments   StepOne              tball flex/rot             rot st     SKTC     piriformis st            pelvic tilts               bridges                                                                       Other Therapeutic Activities:      Home Exercise Program:  provided 22    Manual Treatments:      Modalities:  IFC/MH to LB     Timed Code Treatment Minutes: Total Treatment Minutes:      Treatment/Activity Tolerance:  [] Patient tolerated treatment well [] Patient limited by fatique  [] Patient limited by pain  [] Patient limited by other medical complications  [] Other:     Prognosis: [] Good [] Fair  [] Poor    Patient Requires Follow-up: [] Yes  [] No    Plan:   [] Continue per plan of care [] Alter current plan (see comments)  [] Plan of care initiated [] Hold pending MD visit [] Discharge  Plan for Next Session:      See Weekly Progress Note: []  Yes  []  No  Next due:        Electronically signed by:   nAdrew Sarabia PT

## 2022-05-02 NOTE — PROGRESS NOTES
Physical Therapy      Physical Therapy: Initial Evaluation    Patient: Edmund Mcguire (89 y.o. female)   Examination Date:   Plan of Care Certification Period: 2022 to        :  1945 ;    Confirmed: Yes MRN: [id number]  CSN: [id number]   Insurance: Payor: Sacha Morales / Plan: MEDICARE PART A AND B / Product Type: *No Product type* /   Insurance ID: [id number] - (Medicare) Secondary Insurance (if applicable): Loraine Dupont   Referring Physician: MD Nancy Prince   PCP: Rosemary Birmingham MD Visits to Date/Visits Approved:     No Show/Cancelled Appts:   /       Medical Diagnosis: Dorsalgia, unspecified [M54.9]  Other chronic pain [G89.29] chronic LBP  Treatment Diagnosis:       PERTINENT MEDICAL HISTORY           Medical History: Chart Reviewed: Yes   Past Medical History:   Diagnosis Date    Diverticulosis of large intestine with hemorrhage 2016    Ulcer      Surgical History:   Past Surgical History:   Procedure Laterality Date    ANKLE FRACTURE SURGERY Left     Fall    COLONOSCOPY  16    Dr. Mackey Sides COLONOSCOPY N/A 2021    COLORECTAL CANCER SCREENING, HIGH RISK performed by Pritesh Proctor MD at [facility], DIAGNOSTIC      FINGER AMPUTATION Left     Tip of middle finger removed after door injury   [facility]  16    Dr. Sumit Lui N/A 2021    EGD BIOPSY performed by Pritesh Proctor MD at [facility] ENDOSCOPY       Medications:   Current Outpatient Medications:     meloxicam (MOBIC) 7.5 MG tablet, Take 1 tablet by mouth daily for 14 days, Disp: 14 tablet, Rfl: 0    naproxen (NAPROSYN) 500 MG tablet, Take 1 tablet by mouth 2 times daily as needed for Pain, Disp: 60 tablet, Rfl: 0    vitamin B-12 (CYANOCOBALAMIN) 1000 MCG tablet, Take 1,000 mcg by mouth daily, Disp: , Rfl:     pantoprazole (PROTONIX) 40 MG tablet, Take 1 tablet by mouth daily (with breakfast) (Patient taking differently: Take 40 mg by mouth nightly as needed ), Disp: 30 tablet, Rfl: 3    loratadine (CLARITIN) 10 MG tablet, Take 1 tablet by mouth daily (Patient taking differently: Take 10 mg by mouth daily as needed ), Disp: 30 tablet, Rfl: 3    polyethylene glycol (GLYCOLAX) powder, Take 17 g by mouth daily as needed, Disp: , Rfl:   Allergies: Patient has no known allergies. SUBJECTIVE EXAMINATION      ,           Subjective History:  Onset Date: 04/19/22  Subjective: pt presents to therapy with c/o LBP off/on for the last yr of insidious onset; no PMH for LB/LE injury or sx per pt; no testing for LB on file at this time; MEDS help a little; c/o N/T as well as occassional buckling B LE's with L being worse than R; sleep hampered as well; no noted neuro or pain management consults for issue; no follow-up scheduled at this time  Additional Pertinent Hx (if applicable):            Learning/Language: Learning  Does the patient/guardian have any barriers to learning?: No barriers  What is the preferred language of the patient/guardian?: English     Pain Screening    Pain Screening  Patient Currently in Pain: Yes  Pain Assessment: 0-10  Pain Level: 10  Pain Type: Chronic pain  Pain Location: Back,Leg  Pain Orientation: Right,Left  Pain Descriptors: Aching,Cramping  Functional Pain Assessment: Prevents or interferes with many active not passive activities      OBJECTIVE EXAMINATION     Observations:   General Observations  Description: posture:  level ASIS/PSIS/iliacs; ant pelvic tilt noted    Palpation:  discomfort noted across B LB paraspinals L1-5 into B SI lines        Ambulation/Gait (if applicable):   Ambulation  Surface: level tile  Device: No Device  Assistance: Independent  Gait Deviations: None    Balance Screen:   Balance  Comments: static/dynamic balance is GOOD/GOOD+    Left AROM  Right AROM         AROM LUE (degrees)  LUE AROM : WNL  AROM LLE (degrees)  LLE AROM : WNL    AROM RUE (degrees)  RUE AROM : WNL  AROM RLE (degrees)  RLE AROM: WNL     Left PROM  Right PROM                    Left Strength  Right Strength      Strength Other  Other: grossly 5/5 for all ranges B LE's  Other: grossly 5/5 for all ranges B LE's    Strength Other  Other: grossly 5/5 for all ranges B LE's  Other: grossly 5/5 for all ranges B LE's          Additional Finding(s) (if applicable): Spine  Lumbar: grossly limited ~ 50% WNL for all ranges with no c/o radiculopathy noted  Special Tests: hyperflex/rot  +/+; slump  - B LE's  Other: endurance for all prolonged activities is FAIR+/GOOD-         ASSESSMENT     Impression: Assessment: pain across B sides LB/LE's with all prolonged activities, 10/10    Body Structures, Functions, Activity Limitations Requiring Skilled Therapeutic Intervention: Decreased ROM,Decreased endurance    Statement of Medical Necessity: Physical Therapy is both indicated and medically necessary as outlined in the POC to increase the likelihood of meeting the functionally related goals stated below. Patient's Activity Tolerance: Patient tolerated treatment well      Patient's rehabilitation potential/prognosis is considered to be:  Fair    Factors which may impact rehabilitation potential include:          GOALS   Patient Goal(s):    Goals Completed by 3 weeks Goal Status   decrease pain across B sides LB/LE's with all prolonged activities, 0-4/10     restore LB AROM to WNL for all ranges     improve endurance for all prolonged activities to GOOD     assure I with HEP for home management of condition               TREATMENT PLAN            Pt. actively involved in establishing Plan of Care and Goals: Yes  Patient/ Caregiver education and instruction:               Treatment may include any combination of the following: ROM,Strengthening,Endurance training,Modalities,Home exercise program     Frequency / Duration:  Patient to be seen pt to be seen 2x/week/3 weeks for   weeks      Eval Complexity:    Decision Making: Low Complexity    Therapy Time  Individual Time In: 1100       Individual Time Out: 0384  Minutes: 40        Therapist Signature: Marcy Aldrich PT    Date: 1/6/7986     I certify that the above Therapy Services are being furnished while the patient is under my care. I agree with the treatment plan and certify that this therapy is necessary. Physician's Signature:  ___________________________   Date:_______                                                                   Italia Abraham MD        Physician Comments: _______________________________________________    Please sign and return to [facility] PHYSICAL THERAPY. Please fax to the location listed below.  Reji Torres for this referral!    [address] PHYSICAL THERAPY  [address] [facility] [address]  Dept: [phone]       POC NOTE

## 2022-05-02 NOTE — PLAN OF CARE
[address] PHYSICAL THERAPY  [address]  [address] [address]  Dept: [phone]  Loc: [phone]    PHYSICAL THERAPY PLAN OF CARE:    Patient: Aguila Davies (36 y.o. female)   Examination Date:   Plan of Care Certification Period: 2022 to        :  1945  MRN: [id number]  CSN: [id number]   Insurance: Payor: Long [place] / Plan: MEDICARE PART A AND B / Product Type: *No Product type* /   Insurance ID: [id number] - (Medicare) Secondary Insurance (if applicable): [employer]   Referring Physician: MD Zain Child   PCP: Beth Corbett MD Visits to Date/Visits Approved:     No Show/Cancelled Appts:   /       Medical Diagnosis: Dorsalgia, unspecified [M54.9]  Other chronic pain [G89.29] chronic LBP  No data recorded     ASSESSMENT     Impression: Assessment: pain across B sides LB/LE's with all prolonged activities, 10/10    Body Structures, Functions, Activity Limitations Requiring Skilled Therapeutic Intervention: Decreased ROM,Decreased endurance    Statement of Medical Necessity: Physical Therapy is both indicated and medically necessary as outlined in the POC to increase the likelihood of meeting the functionally related goals stated below. Patient's Activity Tolerance: Patient tolerated treatment well      Patient's rehabilitation potential/prognosis is considered to be:  Fair    Factors which may impact rehabilitation potential include:          GOALS   Patient Goal(s):    Goals Completed by 3 weeks Goal Status   decrease pain across B sides LB/LE's with all prolonged activities, 0-4/10     restore LB AROM to WNL for all ranges     improve endurance for all prolonged activities to GOOD     assure I with HEP for home management of condition               TREATMENT PLAN            Pt. actively involved in establishing Plan of Care and Goals: Yes  Patient/ Caregiver education and instruction:               Treatment may include any combination of the following: ROM,Strengthening,Endurance training,Modalities,Home exercise program     Frequency / Duration:  Patient to be seen pt to be seen 2x/week/3 weeks for   weeks             Signature: Electronically signed by Coby Shelton PT on 5/2/2022 at 11:49 AM.     If you have any questions or concerns, please don't hesitate to call.   Thank you for your referral!

## 2022-05-05 ENCOUNTER — HOSPITAL ENCOUNTER (OUTPATIENT)
Dept: PHYSICAL THERAPY | Age: 77
Setting detail: THERAPIES SERIES
Discharge: HOME | End: 2022-05-05
Payer: MEDICARE

## 2022-05-05 PROCEDURE — G0283 ELEC STIM OTHER THAN WOUND: HCPCS | Performed by: PHYSICAL THERAPIST

## 2022-05-05 PROCEDURE — 97110 THERAPEUTIC EXERCISES: CPT | Performed by: PHYSICAL THERAPIST

## 2022-05-05 NOTE — PROGRESS NOTES
[facility]                Phone: [phone]   Fax: [phone]    Physical Therapy Daily Treatment Note  Date:  2022    Patient Name:  Rachel Murray    :  1945  MRN: [id number]    Evaluating therapist:  EVITA Kelly                    (22)  Restrictions/Precautions:    Diagnosis:  chronic LBP  Treatment Diagnosis:    Insurance/Certification information:  Medicare               cert dates:  27  to  22                     ICD-10:  M54.9  Referring Physician:  Jesus Walton of care signed (Y/N):  Y  Visit# / total visits:    Pain level: 10/10   Time In:  940  Time Out:  1040    Subjective:      Exercises:  Exercise/Equipment Resistance/Repetitions Other comments   StepOne   10 min            tball flex/rot  10x10s           rot st 3x20s    SKTC 3x20s    piriformis st 3x20s           pelvic tilts 15x3s              bridges 15x3s                                                                      Other Therapeutic Activities:      Home Exercise Program:  provided 22    Manual Treatments:      Modalities:  IFC/MH to LB x 15 min     Timed Code Treatment Minutes: Total Treatment Minutes:      Treatment/Activity Tolerance:  [] Patient tolerated treatment well [] Patient limited by fatique  [] Patient limited by pain  [] Patient limited by other medical complications  [] Other:     Prognosis: [] Good [] Fair  [] Poor    Patient Requires Follow-up: [] Yes  [] No    Plan:   [] Continue per plan of care [] Alter current plan (see comments)  [] Plan of care initiated [] Hold pending MD visit [] Discharge  Plan for Next Session:      See Weekly Progress Note: []  Yes  []  No  Next due:        Electronically signed by:   Walker Fothergill, PT

## 2022-05-09 ENCOUNTER — HOSPITAL ENCOUNTER (OUTPATIENT)
Dept: PHYSICAL THERAPY | Age: 77
Setting detail: THERAPIES SERIES
Discharge: HOME | End: 2022-05-09
Payer: MEDICARE

## 2022-05-09 PROCEDURE — G0283 ELEC STIM OTHER THAN WOUND: HCPCS | Performed by: PHYSICAL THERAPIST

## 2022-05-09 PROCEDURE — 97110 THERAPEUTIC EXERCISES: CPT | Performed by: PHYSICAL THERAPIST

## 2022-05-09 NOTE — PROGRESS NOTES
[address] [facility]                Phone: [phone]   Fax: [phone]    Physical Therapy Daily Treatment Note  Date:  2022    Patient Name:  George Parra    :  1945  MRN: [id number]    Evaluating therapist:  EVITA Reid                    (22)  Restrictions/Precautions:    Diagnosis:  chronic LBP  Treatment Diagnosis:    Insurance/Certification information:  Medicare               cert dates:    to  22                     ICD-10:  M54.9  Referring Physician:  Gurvinder Zapata of care signed (Y/N):  Y  Visit# / total visits:  3/6  Pain level: 10/10   Time In:  1006  Time Out:  1101    Subjective:      Exercises:  Exercise/Equipment Resistance/Repetitions Other comments   StepOne   10 min            tball flex/rot  10x10s           rot st 3x20s    SKTC 3x20s    piriformis st 3x20s           pelvic tilts 15x3s              bridges 15x3s                                                                      Other Therapeutic Activities:      Home Exercise Program:  provided 22    Manual Treatments:      Modalities:  IFC/MH to LB x 15 min     Timed Code Treatment Minutes: Total Treatment Minutes:      Treatment/Activity Tolerance:  [] Patient tolerated treatment well [] Patient limited by fatique  [] Patient limited by pain  [] Patient limited by other medical complications  [] Other:     Prognosis: [] Good [] Fair  [] Poor    Patient Requires Follow-up: [] Yes  [] No    Plan:   [] Continue per plan of care [] Alter current plan (see comments)  [] Plan of care initiated [] Hold pending MD visit [] Discharge  Plan for Next Session:      See Weekly Progress Note: []  Yes  []  No  Next due:        Electronically signed by:   Kumar Diaz PT

## 2022-05-12 ENCOUNTER — HOSPITAL ENCOUNTER (OUTPATIENT)
Dept: PHYSICAL THERAPY | Age: 77
Setting detail: THERAPIES SERIES
Discharge: HOME | End: 2022-05-12
Payer: MEDICARE

## 2022-05-12 PROCEDURE — G0283 ELEC STIM OTHER THAN WOUND: HCPCS | Performed by: PHYSICAL THERAPIST

## 2022-05-12 PROCEDURE — 97110 THERAPEUTIC EXERCISES: CPT | Performed by: PHYSICAL THERAPIST

## 2022-05-12 NOTE — PROGRESS NOTES
[address] [facility]                Phone: [phone]   Fax: [phone]    Physical Therapy Daily Treatment Note  Date:  2022    Patient Name:  Rvier Carpenter    :  1945  MRN: [id number]    Evaluating therapist:  Lynelle Collet, MPT                    (22)  Restrictions/Precautions:    Diagnosis:  chronic LBP  Treatment Diagnosis:    Insurance/Certification information:  Medicare               cert dates:  97  to  22                     ICD-10:  M54.9  Referring Physician:  Clementine Nova of care signed (Y/N):  Y  Visit# / total visits:    Pain level: 10/10   Time In:  1006  Time Out:  1110    Subjective:      Exercises:  Exercise/Equipment Resistance/Repetitions Other comments   StepOne   10 min            tball flex/rot  10x10s           rot st 3x20s    SKTC 3x20s    piriformis st 3x20s           pelvic tilts 15x3s              bridges 15x3s                                                                      Other Therapeutic Activities:      Home Exercise Program:  provided 22    Manual Treatments:      Modalities:  IFC/MH to LB x 15 min     Timed Code Treatment Minutes: Total Treatment Minutes:      Treatment/Activity Tolerance:  [] Patient tolerated treatment well [] Patient limited by fatique  [] Patient limited by pain  [] Patient limited by other medical complications  [] Other:     Prognosis: [] Good [] Fair  [] Poor    Patient Requires Follow-up: [] Yes  [] No    Plan:   [] Continue per plan of care [] Alter current plan (see comments)  [] Plan of care initiated [] Hold pending MD visit [] Discharge  Plan for Next Session:      See Weekly Progress Note: []  Yes  []  No  Next due:        Electronically signed by:   Ann Marie Salter PT

## 2022-05-12 NOTE — PROGRESS NOTES
S:  pt presents to therapy for two oft wo scheduled visits this week; at week's end she reports that she is feeling better overall; pain level down to /10 and seems less limiting to her overall; sleep remains hampered a bit however;  HEP going well per pt    O:  performed the exercises/treatments as written in the flowsheet for the week ending 5/13/22; initiated HEP for home management of condition; LB AROM grossly 75%WNL for all ranges;  B LE strength grossly /5 for all planes     A:  khloe tx well; pt able to perform all requested tasks with good form and slower pacing; LB AROM shows some overall improvement while B LE strength grossly stable since eval; movement seems smoother and less deliberate across trunk for all planes; gait stable with normal mechanics noted; endurance for all prolonged activities is GOOD    P:  cont with POC of stretching/strengthening for LB/LE's with modalities as neededweek; at week's end she reports that her LB

## 2022-05-17 ENCOUNTER — HOSPITAL ENCOUNTER (OUTPATIENT)
Dept: PHYSICAL THERAPY | Age: 77
Setting detail: THERAPIES SERIES
Discharge: HOME | End: 2022-05-17
Payer: MEDICARE

## 2022-05-17 PROCEDURE — G0283 ELEC STIM OTHER THAN WOUND: HCPCS | Performed by: PHYSICAL THERAPIST

## 2022-05-17 PROCEDURE — 97110 THERAPEUTIC EXERCISES: CPT | Performed by: PHYSICAL THERAPIST

## 2022-05-17 NOTE — PROGRESS NOTES
[address] [facility]                Phone: [phone]   Fax: [phone]    Physical Therapy Daily Treatment Note  Date:  2022    Patient Name:  Bina Cuevas    :  1945  MRN: [id number]    Evaluating therapist:  EVITA Shah                    (22)  Restrictions/Precautions:    Diagnosis:  chronic LBP  Treatment Diagnosis:    Insurance/Certification information:  Medicare               cert dates:  71  to  22                     ICD-10:  M54.9  Referring Physician:  Mini Hoang of care signed (Y/N):  Y  Visit# / total visits:    Pain level: 10/10   Time In:  958  Time Out:  1053    Subjective:      Exercises:  Exercise/Equipment Resistance/Repetitions Other comments   StepOne   10 min            tball flex/rot  10x10s           rot st 3x20s    SKTC 3x20s    piriformis st 3x20s           pelvic tilts 15x3s              bridges 15x3s                                                                      Other Therapeutic Activities:      Home Exercise Program:  provided 22    Manual Treatments:      Modalities:  IFC/MH to LB x 15 min     Timed Code Treatment Minutes: Total Treatment Minutes:      Treatment/Activity Tolerance:  [] Patient tolerated treatment well [] Patient limited by fatique  [] Patient limited by pain  [] Patient limited by other medical complications  [] Other:     Prognosis: [] Good [] Fair  [] Poor    Patient Requires Follow-up: [] Yes  [] No    Plan:   [] Continue per plan of care [] Alter current plan (see comments)  [] Plan of care initiated [] Hold pending MD visit [] Discharge  Plan for Next Session:      See Weekly Progress Note: []  Yes  []  No  Next due:        Electronically signed by:   Andrew Sarabia PT

## 2022-05-20 ENCOUNTER — HOSPITAL ENCOUNTER (OUTPATIENT)
Dept: PHYSICAL THERAPY | Age: 77
Setting detail: THERAPIES SERIES
Discharge: HOME | End: 2022-05-20
Payer: MEDICARE

## 2022-05-20 PROCEDURE — 97110 THERAPEUTIC EXERCISES: CPT | Performed by: PHYSICAL THERAPIST

## 2022-05-20 PROCEDURE — G0283 ELEC STIM OTHER THAN WOUND: HCPCS | Performed by: PHYSICAL THERAPIST

## 2022-05-20 NOTE — PROGRESS NOTES
[address] [facility]                Phone: [phone]   Fax: [phone]    Physical Therapy Daily Treatment Note  Date:  2022    Patient Name:  Gareth Tirado    :  1945  MRN: [id number]    Evaluating therapist:  EVITA Pappas                    (22)  Restrictions/Precautions:    Diagnosis:  chronic LBP  Treatment Diagnosis:    Insurance/Certification information:  Medicare               cert dates:    to  22                     ICD-10:  M54.9  Referring Physician:  John Dale of care signed (Y/N):  Y  Visit# / total visits:    Pain level: 10/10   Time In:  1006  Time Out:  1056    Subjective:      Exercises:  Exercise/Equipment Resistance/Repetitions Other comments   StepOne   10 min            tball flex/rot  10x10s           rot st 3x20s    SKTC 3x20s    piriformis st 3x20s           pelvic tilts 15x3s              bridges 15x3s                                                                      Other Therapeutic Activities:      Home Exercise Program:  provided 22    Manual Treatments:      Modalities:  IFC/MH to LB x 15 min     Timed Code Treatment Minutes: Total Treatment Minutes:      Treatment/Activity Tolerance:  [] Patient tolerated treatment well [] Patient limited by fatique  [] Patient limited by pain  [] Patient limited by other medical complications  [] Other:     Prognosis: [] Good [] Fair  [] Poor    Patient Requires Follow-up: [] Yes  [] No    Plan:   [] Continue per plan of care [] Alter current plan (see comments)  [] Plan of care initiated [] Hold pending MD visit [] Discharge  Plan for Next Session:      See Weekly Progress Note: []  Yes  []  No  Next due:        Electronically signed by:   Aline Burger PT

## 2022-06-13 ENCOUNTER — OFFICE VISIT (OUTPATIENT)
Dept: FAMILY MEDICINE CLINIC | Age: 77
End: 2022-06-13
Payer: MEDICARE

## 2022-06-13 ENCOUNTER — HOSPITAL ENCOUNTER (OUTPATIENT)
Age: 77
Discharge: HOME | End: 2022-06-15
Payer: MEDICARE

## 2022-06-13 ENCOUNTER — HOSPITAL ENCOUNTER (OUTPATIENT)
Dept: GENERAL RADIOLOGY | Age: 77
Discharge: HOME | End: 2022-06-15
Payer: MEDICARE

## 2022-06-13 VITALS
HEART RATE: 75 BPM | BODY MASS INDEX: 33.91 KG/M2 | OXYGEN SATURATION: 98 % | WEIGHT: 223 LBS | DIASTOLIC BLOOD PRESSURE: 76 MMHG | RESPIRATION RATE: 16 BRPM | SYSTOLIC BLOOD PRESSURE: 118 MMHG | TEMPERATURE: 97.3 F

## 2022-06-13 DIAGNOSIS — G89.29 CHRONIC BILATERAL LOW BACK PAIN WITH BILATERAL SCIATICA: ICD-10-CM

## 2022-06-13 DIAGNOSIS — G62.9 NEUROPATHY: ICD-10-CM

## 2022-06-13 DIAGNOSIS — E55.9 VITAMIN D DEFICIENCY: ICD-10-CM

## 2022-06-13 DIAGNOSIS — R53.1 WEAKNESS: ICD-10-CM

## 2022-06-13 DIAGNOSIS — M54.41 CHRONIC BILATERAL LOW BACK PAIN WITH BILATERAL SCIATICA: ICD-10-CM

## 2022-06-13 DIAGNOSIS — M54.42 CHRONIC BILATERAL LOW BACK PAIN WITH BILATERAL SCIATICA: ICD-10-CM

## 2022-06-13 DIAGNOSIS — R73.9 HYPERGLYCEMIA: ICD-10-CM

## 2022-06-13 DIAGNOSIS — R53.1 WEAKNESS: Primary | ICD-10-CM

## 2022-06-13 LAB
ALBUMIN SERPL-MCNC: 4 G/DL (ref 3.5–5.2)
ALP BLD-CCNC: 116 U/L (ref 35–104)
ALT SERPL-CCNC: 8 U/L (ref 0–32)
ANION GAP SERPL CALCULATED.3IONS-SCNC: 16 MMOL/L (ref 7–16)
AST SERPL-CCNC: 19 U/L (ref 0–31)
BASOPHILS ABSOLUTE: 0.05 E9/L (ref 0–0.2)
BASOPHILS RELATIVE PERCENT: 1 % (ref 0–2)
BILIRUB SERPL-MCNC: 0.7 MG/DL (ref 0–1.2)
BUN BLDV-MCNC: 15 MG/DL (ref 6–23)
C-REACTIVE PROTEIN: 0.3 MG/DL (ref 0–0.4)
CALCIUM SERPL-MCNC: 9.4 MG/DL (ref 8.6–10.2)
CHLORIDE BLD-SCNC: 106 MMOL/L (ref 98–107)
CO2: 19 MMOL/L (ref 22–29)
CREAT SERPL-MCNC: 1 MG/DL (ref 0.5–1)
EOSINOPHILS ABSOLUTE: 0.07 E9/L (ref 0.05–0.5)
EOSINOPHILS RELATIVE PERCENT: 1.4 % (ref 0–6)
FOLATE: 9 NG/ML (ref 4.8–24.2)
GFR AFRICAN AMERICAN: >60
GFR NON-AFRICAN AMERICAN: 54 ML/MIN/1.73
GLUCOSE BLD-MCNC: 106 MG/DL (ref 74–99)
HBA1C MFR BLD: 5.5 % (ref 4–5.6)
HCT VFR BLD CALC: 49.5 % (ref 34–48)
HEMOGLOBIN: 15.2 G/DL (ref 11.5–15.5)
IMMATURE GRANULOCYTES #: 0.02 E9/L
IMMATURE GRANULOCYTES %: 0.4 % (ref 0–5)
LYMPHOCYTES ABSOLUTE: 1.24 E9/L (ref 1.5–4)
LYMPHOCYTES RELATIVE PERCENT: 24.1 % (ref 20–42)
MCH RBC QN AUTO: 27.1 PG (ref 26–35)
MCHC RBC AUTO-ENTMCNC: 30.7 % (ref 32–34.5)
MCV RBC AUTO: 88.2 FL (ref 80–99.9)
MONOCYTES ABSOLUTE: 0.54 E9/L (ref 0.1–0.95)
MONOCYTES RELATIVE PERCENT: 10.5 % (ref 2–12)
NEUTROPHILS ABSOLUTE: 3.22 E9/L (ref 1.8–7.3)
NEUTROPHILS RELATIVE PERCENT: 62.6 % (ref 43–80)
PDW BLD-RTO: 12.8 FL (ref 11.5–15)
PLATELET # BLD: 246 E9/L (ref 130–450)
PMV BLD AUTO: 9.7 FL (ref 7–12)
POTASSIUM SERPL-SCNC: 4.3 MMOL/L (ref 3.5–5)
RBC # BLD: 5.61 E12/L (ref 3.5–5.5)
SEDIMENTATION RATE, ERYTHROCYTE: 2 MM/HR (ref 0–20)
SODIUM BLD-SCNC: 141 MMOL/L (ref 132–146)
TOTAL CK: 131 U/L (ref 20–180)
TOTAL PROTEIN: 6.8 G/DL (ref 6.4–8.3)
TSH SERPL DL<=0.05 MIU/L-ACNC: 2.07 UIU/ML (ref 0.27–4.2)
VITAMIN B-12: 660 PG/ML (ref 211–946)
VITAMIN D 25-HYDROXY: 40 NG/ML (ref 30–100)
WBC # BLD: 5.1 E9/L (ref 4.5–11.5)

## 2022-06-13 PROCEDURE — 1123F ACP DISCUSS/DSCN MKR DOCD: CPT | Performed by: FAMILY MEDICINE

## 2022-06-13 PROCEDURE — G8417 CALC BMI ABV UP PARAM F/U: HCPCS | Performed by: FAMILY MEDICINE

## 2022-06-13 PROCEDURE — 99213 OFFICE O/P EST LOW 20 MIN: CPT | Performed by: FAMILY MEDICINE

## 2022-06-13 PROCEDURE — 99212 OFFICE O/P EST SF 10 MIN: CPT | Performed by: STUDENT IN AN ORGANIZED HEALTH CARE EDUCATION/TRAINING PROGRAM

## 2022-06-13 PROCEDURE — 1090F PRES/ABSN URINE INCON ASSESS: CPT | Performed by: FAMILY MEDICINE

## 2022-06-13 PROCEDURE — G8400 PT W/DXA NO RESULTS DOC: HCPCS | Performed by: FAMILY MEDICINE

## 2022-06-13 PROCEDURE — 36415 COLL VENOUS BLD VENIPUNCTURE: CPT | Performed by: FAMILY MEDICINE

## 2022-06-13 PROCEDURE — G8427 DOCREV CUR MEDS BY ELIG CLIN: HCPCS | Performed by: FAMILY MEDICINE

## 2022-06-13 PROCEDURE — 72100 X-RAY EXAM L-S SPINE 2/3 VWS: CPT

## 2022-06-13 PROCEDURE — 1036F TOBACCO NON-USER: CPT | Performed by: FAMILY MEDICINE

## 2022-06-13 NOTE — PROGRESS NOTES
[address] [facility]                Phone: [phone]   Fax: [phone]    To:  Dr. Willam Diaz       From: Geovanny Crisostomo PT,MPT     Patient: Bindu Curry       : 1945  Diagnosis:       Date: 2022  Treatment Diagnosis:   chronic LBP    Physical Therapy Discharge Note    Total Visits to date:   6 Cancels/No-shows to date:      Plan of Care/Treatment to date:  [x] Therapeutic Exercise    [x] Modalities:  [x] Therapeutic Activity     [] Ultrasound  [x] Electrical Stimulation  [] Gait Training      [] Cervical Traction   [] Lumbar Traction  [] Neuromuscular Re-education  [x] Cold/hotpack [] Iontophoresis  [x] Instruction in HEP      Other:  [] Manual Therapy       []    [] Aquatic Therapy       []                            Progress towards goals:  pt reached all stated funcitional LTG's for therapy and was I with HEP for home mangement of condition     Goal Status:  [x] Achieved [] Partially Achieved  [] Not Achieved     Patient Status: [] Continue per initial plan of Care     [x] Patient now discharged     [] Additional visits requested, Please re-certify for additional visits:          Electronically signed by: Geovanny Crisostomo PT ,MPT    If you have any questions or concerns, please don't hesitate to call.   Thank you for your referral.

## 2022-06-13 NOTE — PROGRESS NOTES
[address] [facility]  FAMILY MEDICINE RESIDENCY PROGRAM  DATE OF VISIT : 2022    Patient : Alexander Eugene   Age : 68 y.o.  : 1945   MRN : [id number]   ______________________________________________________________________    Chief Complaint :   Chief Complaint   Patient presents with    Back Pain       HPI : Alexander Eugene is 68 y.o. female who presented to the clinic today for back pain with sensation of legs \"draining,\" being fatigued. Patient has hx of back pain, and has tried physical therapy. She states it did not really help, and she felt tired afterwards. She has had intermittent sciatica before, but reports it is not usually sustained. However, for the past few weeks, she had more of a constant draining sensation, and sometimes her legs feel like they are on fire. R worse than left, and R leg sometimes feels like weak. It feels better with laying down have having legs up. Patient also reports new onset dizziness with standing past few weeks, and that she gets a spinning sensation. She also gets a sensation of upper extremity weakness, but only after lower extremity weakness occurs. This draining sensation makes her nauseous. Symptoms do feel a little better with an empty bladder. Denies loss of bowel/bladder loss. Denies saddle anesthesia. Takes Excedrin and Tylenol extra strength, and it takes the edge off but does not give full relief. States she may not be hydrating well.     Past Medical History :  Past Medical History:   Diagnosis Date    Diverticulosis of large intestine with hemorrhage 2016    Ulcer      Past Surgical History:   Procedure Laterality Date    ANKLE FRACTURE SURGERY Left     Fall    COLONOSCOPY  16    Dr. Tristan December COLONOSCOPY N/A 2021    COLORECTAL CANCER SCREENING, HIGH RISK performed by Tristian Werner MD at [address] [place] [address], COLON, DIAGNOSTIC      FINGER AMPUTATION Left     Tip of middle finger removed after door injury    TIBIA FRACTURE SURGERY Left 1982    Fall    UPPER GASTROINTESTINAL ENDOSCOPY  2/28/16    Dr. Tomas Canchola N/A 6/2/2021    EGD BIOPSY performed by Jessica Roberts MD at [address] [place] [address]       Allergies :   No Known Allergies    Medication List :    Current Outpatient Medications   Medication Sig Dispense Refill    naproxen (NAPROSYN) 500 MG tablet Take 1 tablet by mouth 2 times daily as needed for Pain 30 tablet 0    gabapentin (NEURONTIN) 100 MG capsule Take 1 capsule by mouth 3 times daily for 30 days. Intended supply: 90 days 90 capsule 0    vitamin B-12 (CYANOCOBALAMIN) 1000 MCG tablet Take 1,000 mcg by mouth daily      pantoprazole (PROTONIX) 40 MG tablet Take 1 tablet by mouth daily (with breakfast) (Patient taking differently: Take 40 mg by mouth nightly as needed ) 30 tablet 3    loratadine (CLARITIN) 10 MG tablet Take 1 tablet by mouth daily (Patient taking differently: Take 10 mg by mouth daily as needed ) 30 tablet 3    polyethylene glycol (GLYCOLAX) powder Take 17 g by mouth daily as needed      meloxicam (MOBIC) 7.5 MG tablet Take 1 tablet by mouth daily for 14 days 14 tablet 0     No current facility-administered medications for this visit.        ______________________________________________________________________    Physical Exam :    Vitals: /76   Pulse 75   Temp 97.3 °F (36.3 °C)   Resp 16   Wt 223 lb (101.2 kg)   SpO2 98%   BMI 33.91 kg/m²   General Appearance: Awake, alert, oriented, and in NAD  HEENT: NCAT, no pallor or icterus  Neck: Symmetrical, trachea midline. Chest wall/Lung: CTAB, respirations unlabored. No ronchi/wheezing/rales  Heart: RRR, normal S1 and S2, no murmurs, rubs or gallops  Abdomen: SNTND  Extremities: Extremities normal, atraumatic, no cyanosis, clubbing or edema. Neurologic: Sensation decreased RLE and R forearm, but normal sensation BUEs from elbow up. 5/5 motor. CNs intact.  FTN nl.   Psychiatric: Normal mood. Normal affect. Normal behavior  ______________________________________________________________________    Assessment & Plan :    1. Weakness  - TSH; Future  - Vitamin B12 & Folate; Future  - C-Reactive Protein; Future  - Sedimentation Rate; Future  - CBC with Auto Differential; Future  - Comprehensive Metabolic Panel; Future  - Vitamin D 25 Hydroxy; Future  - CK; Future  - XR LUMBAR SPINE (2-3 VIEWS); Future  - EMG; Future    2. Neuropathy  · Trial of gabapentin  - TSH; Future  - Vitamin B12 & Folate; Future  - C-Reactive Protein; Future  - Sedimentation Rate; Future  - CBC with Auto Differential; Future  - Comprehensive Metabolic Panel; Future  - Vitamin D 25 Hydroxy; Future  - XR LUMBAR SPINE (2-3 VIEWS); Future  - EMG; Future  - Hemoglobin A1C; Future  - gabapentin (NEURONTIN) 100 MG capsule; Take 1 capsule by mouth 3 times daily for 30 days. Intended supply: 90 days  Dispense: 90 capsule; Refill: 0    3. Chronic bilateral low back pain with bilateral sciatica  · Trial naproxen  - XR LUMBAR SPINE (2-3 VIEWS); Future  - EMG; Future  - naproxen (NAPROSYN) 500 MG tablet; Take 1 tablet by mouth 2 times daily as needed for Pain  Dispense: 30 tablet; Refill: 0    4. Hyperglycemia  - Hemoglobin A1C; Future    5. Vitamin D deficiency  - Vitamin D 25 Hydroxy; Future      Additional plan and future considerations:       Return to Office: Return in about 2 weeks (around 6/27/2022) for assess response of symptoms to new treatment.     Billy Cortez DO   Case discussed with Dr. Anamika Rosas

## 2022-06-13 NOTE — PROGRESS NOTES
S: 68 y.o. female here for feeling of legs on fire and feeling drained sensation in legs and then arm. LBP chronic, but this constant BLE sensation is new since 3 mo ago  Did PT for LBP. O: VS: /76   Pulse 75   Temp 97.3 °F (36.3 °C)   Resp 16   Wt 223 lb (101.2 kg)   SpO2 98%   BMI 33.91 kg/m²    General: NAD, alert and interacting appropriately. CV:  RRR, no gallops, rubs, or murmurs    Resp: CTAB   Abd:  Soft, nontender   Ext:  Sensation decreased RLE and R forearm, but nl sensation BUEs from elbow up. 5/5 motor  CNs intact. FTN nl. Impression: neuropathy 2/2 see orders vs consider MG  Plan:   W/u for peripheral neuropathy. nsaid course for 2 wks  rtc pending results    Attending Physician Statement  I have discussed the case, including pertinent history and exam findings with the resident. I agree with the documented assessment and plan.

## 2022-06-14 RX ORDER — NAPROXEN 500 MG/1
500 TABLET ORAL 2 TIMES DAILY PRN
Qty: 30 TABLET | Refills: 0 | Status: SHIPPED
Start: 2022-06-14 | End: 2022-10-15 | Stop reason: ALTCHOICE

## 2022-06-14 RX ORDER — GABAPENTIN 100 MG/1
100 CAPSULE ORAL 3 TIMES DAILY
Qty: 90 CAPSULE | Refills: 0 | Status: SHIPPED
Start: 2022-06-14 | End: 2022-11-01 | Stop reason: ALTCHOICE

## 2022-06-22 NOTE — RESULT ENCOUNTER NOTE
Labs overall normal. Lumbar Xray returned: There is preservation of the normal lordotic curve.  There is a suggestion of  lumbarization of S1.  A lumbar scoliosis is seen, with convexity to the  right. [place] is no evidence of fracture.  Mild retrolisthesis is identified  at L5 over S1. osteo-degenerative changes are noted throughout the lumbar  spine, with anterior and lateral spondylitic ridging present.  Multilevel  bilateral facet hypertrophy and sclerosis is seen.  No other significant  osseous abnormality is seen.  \"Vacuum-disc\" phenomena are identified from  L3-4 down to L5-S1.  Intervertebral disc space narrowing is identified at  L2-3 and L5-S1.  The abdominal aorta is atherosclerotic.  Tiny phleboliths  are noted in the pelvis.  A tiny calcification is seen in the right lower  quadrant, either representing a phlebolith or calcified lymph node. Called patient and discussed results.  Symptoms are improving with rest.

## 2022-06-27 NOTE — TELEPHONE ENCOUNTER
Prior Authorization Form:      DEMOGRAPHICS:                     Patient Name:  Kia Guerrier  Patient :  1945            Insurance:  Payor: MEDICARE / Plan: MEDICARE PART A AND B / Product Type: *No Product type* /   Insurance ID Number:    Payor/Plan Subscr  Sex Relation Sub. Ins. ID Effective Group Num   1. [address] M 1945 Female Self [id number]                                    [address] [address]   2.  [facility] 1945 Female Self [id number] Plan F                                   [address]         DIAGNOSIS & PROCEDURE:                       Procedure/Operation: EGD & Colonoscopy           CPT Code: 26718 & 92806    Diagnosis:  GERD & family H/O     ICD10 Code: K21.0 & Z80.0     Location:  [facility]    Surgeon:  Ebenezer Vivas INFORMATION:                          Date: 2021    Time: 11:30am              Anesthesia:  MAC/TIVA                                                       Status:  Outpatient        Special Comments:  N/A       Electronically signed by Nika Gomez MA on 2021 at 1:40 PM [Initial Evaluation] : an initial evaluation [DM Type 2] : DM Type 2

## 2022-10-15 ENCOUNTER — HOSPITAL ENCOUNTER (EMERGENCY)
Age: 77
Discharge: HOME | End: 2022-10-15
Attending: EMERGENCY MEDICINE
Payer: MEDICARE

## 2022-10-15 ENCOUNTER — APPOINTMENT (OUTPATIENT)
Dept: GENERAL RADIOLOGY | Age: 77
End: 2022-10-15
Payer: MEDICARE

## 2022-10-15 ENCOUNTER — APPOINTMENT (OUTPATIENT)
Dept: CT IMAGING | Age: 77
End: 2022-10-15
Payer: MEDICARE

## 2022-10-15 VITALS
WEIGHT: 220 LBS | RESPIRATION RATE: 16 BRPM | OXYGEN SATURATION: 100 % | SYSTOLIC BLOOD PRESSURE: 142 MMHG | TEMPERATURE: 98 F | DIASTOLIC BLOOD PRESSURE: 75 MMHG | HEART RATE: 65 BPM | BODY MASS INDEX: 33.45 KG/M2

## 2022-10-15 DIAGNOSIS — S52.502A CLOSED FRACTURE OF DISTAL END OF LEFT RADIUS, UNSPECIFIED FRACTURE MORPHOLOGY, INITIAL ENCOUNTER: Primary | ICD-10-CM

## 2022-10-15 DIAGNOSIS — S09.90XA CLOSED HEAD INJURY, INITIAL ENCOUNTER: ICD-10-CM
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
## 2022-10-15 DIAGNOSIS — S30.0XXA LUMBAR CONTUSION, INITIAL ENCOUNTER: ICD-10-CM

## 2022-10-15 PROCEDURE — 72131 CT LUMBAR SPINE W/O DYE: CPT

## 2022-10-15 PROCEDURE — 99284 EMERGENCY DEPT VISIT MOD MDM: CPT

## 2022-10-15 PROCEDURE — 70450 CT HEAD/BRAIN W/O DYE: CPT

## 2022-10-15 PROCEDURE — 73110 X-RAY EXAM OF WRIST: CPT

## 2022-10-15 PROCEDURE — 6370000000 HC RX 637 (ALT 250 FOR IP): Performed by: NURSE PRACTITIONER

## 2022-10-15 PROCEDURE — 25605 CLTX DST RDL FX/EPHYS SEP W/: CPT | Performed by: PHYSICAL THERAPY ASSISTANT

## 2022-10-15 PROCEDURE — 72125 CT NECK SPINE W/O DYE: CPT

## 2022-10-15 PROCEDURE — 73130 X-RAY EXAM OF HAND: CPT

## 2022-10-15 RX ORDER — HYDROCODONE BITARTRATE AND ACETAMINOPHEN 5; 325 MG/1; MG/1
1 TABLET ORAL EVERY 4 HOURS PRN
Qty: 3 TABLET | Refills: 0 | Status: SHIPPED | OUTPATIENT
Start: 2022-10-15 | End: 2022-10-18

## 2022-10-15 RX ORDER — ACETAMINOPHEN 500 MG
1000 TABLET ORAL ONCE
Status: COMPLETED | OUTPATIENT
Start: 2022-10-15 | End: 2022-10-15

## 2022-10-15 RX ORDER — IBUPROFEN 800 MG/1
800 TABLET ORAL EVERY 8 HOURS PRN
Qty: 21 TABLET | Refills: 0 | Status: SHIPPED | OUTPATIENT
Start: 2022-10-15 | End: 2022-11-01

## 2022-10-15 RX ORDER — LIDOCAINE HYDROCHLORIDE 10 MG/ML
10 INJECTION, SOLUTION EPIDURAL; INFILTRATION; INTRACAUDAL; PERINEURAL SEE ADMIN INSTRUCTIONS
Status: DISCONTINUED | OUTPATIENT
Start: 2022-10-15 | End: 2022-10-16 | Stop reason: HOSPADM

## 2022-10-15 RX ADMIN — ACETAMINOPHEN 1000 MG: 500 TABLET ORAL at 18:54

## 2022-10-15 ASSESSMENT — PAIN DESCRIPTION - LOCATION: LOCATION: WRIST;BACK

## 2022-10-15 ASSESSMENT — PAIN DESCRIPTION - PAIN TYPE: TYPE: ACUTE PAIN

## 2022-10-15 ASSESSMENT — PAIN DESCRIPTION - FREQUENCY: FREQUENCY: INTERMITTENT

## 2022-10-15 ASSESSMENT — PAIN - FUNCTIONAL ASSESSMENT
PAIN_FUNCTIONAL_ASSESSMENT: 0-10
PAIN_FUNCTIONAL_ASSESSMENT: 0-10

## 2022-10-15 ASSESSMENT — PAIN SCALES - GENERAL
PAINLEVEL_OUTOF10: 4
PAINLEVEL_OUTOF10: 5

## 2022-10-15 ASSESSMENT — PAIN DESCRIPTION - ORIENTATION: ORIENTATION: LEFT

## 2022-10-15 NOTE — ED PROVIDER NOTES
ED Physician   HPI:  10/15/22, Time: 6:47 PM EDT         Melchor Diallo is a 68 y.o. female presenting to the ED for fall with injury. Patient presents to the emergency department after having a mechanical fall. Patient reports that she was at the [facility] and was walking when she tripped causing her to fall directly onto her buttocks and then striking the back of her head. States she attempted to break her fall with her hands. She reports now severe pain to her left hand and wrist with notable swelling. She denies loss of consciousness and is not on any anticoagulant therapy. She denied feeling weak or dizzy prior to this fall. Patient does complain of pain to the back of her head as well as lower lumbar spine. Patient denies take anything for pain prior to arrival but stated earlier in the day around 12 noon did take some pain relievers. Patient reports otherwise normal state of health, denies chest pain, shortness of breath or abdominal pain as well as no noted headache or any visual disturbance and has not had any nausea or vomiting. Patient is able to ambulate. Denies any unusual lower extremity or even upper extremity paresthesia as well as no unexplained saddle anesthesia or any incontinence. Patient was symptoms moderate in severity and persistent. Review of Systems:   A complete review of systems was performed and pertinent positives and negatives are stated within HPI, all other systems reviewed and are negative.          --------------------------------------------- PAST HISTORY ---------------------------------------------  Past Medical History:  has a past medical history of Diverticulosis of large intestine with hemorrhage and Ulcer. Past Surgical History:  has a past surgical history that includes Upper gastrointestinal endoscopy (2/28/16); Colonoscopy (2/28/16); Tibia fracture surgery (Left, 1982); Ankle fracture surgery (Left, 1982);  Finger amputation (Left, 2007); Endoscopy, colon, diagnostic; Upper gastrointestinal endoscopy (N/A, 6/2/2021); and Colonoscopy (N/A, 6/2/2021). Social History:  reports that she has never smoked. She has never used smokeless tobacco. She reports that she does not currently use alcohol after a past usage of about 5.0 standard drinks per week. She reports that she does not use drugs. Family History: family history includes Cancer (age of onset: 43) in her father; Diabetes in her mother; Substance Abuse in her child. The patients home medications have been reviewed. Allergies: Patient has no known allergies. -------------------------------------------------- RESULTS -------------------------------------------------  All laboratory and radiology results have been personally reviewed by myself   LABS:  No results found for this visit on 10/15/22. RADIOLOGY:  Interpreted by Radiologist.  [address] [place] [address]   Final Result   No acute intracranial abnormality. CT CERVICAL SPINE WO CONTRAST   Final Result   No acute abnormality of the cervical spine. CT LUMBAR SPINE WO CONTRAST   Final Result   No acute lumbar spine fracture or traumatic malalignment. XR HAND LEFT (MIN 3 VIEWS)   Final Result   Mildly displaced fracture of the distal radial metaphysis. XR WRIST LEFT (MIN 3 VIEWS)   Final Result   Mildly displaced fracture of the distal radial metaphysis. XR WRIST LEFT (MIN 3 VIEWS)    (Results Pending)       ------------------------- NURSING NOTES AND VITALS REVIEWED ---------------------------   The nursing notes within the ED encounter and vital signs as below have been reviewed.    BP (!) 142/75   Pulse 65   Temp 98 °F (36.7 °C) (Oral)   Resp 16   Wt 220 lb (99.8 kg)   SpO2 100%   BMI 33.45 kg/m²   Oxygen Saturation Interpretation: Normal      ---------------------------------------------------PHYSICAL EXAM--------------------------------------      Constitutional/General: Alert and oriented x3, mildly uncomfortable   head: Normocephalic and atraumatic  Eyes: PERRL, EOMI  Mouth: Oropharynx clear, handling secretions, no trismus  Neck: Supple, full ROM,   Pulmonary: Lungs clear to auscultation bilaterally, no wheezes, rales, or rhonchi. Not in respiratory distress  Cardiovascular:  Regular rate and rhythm, no murmurs, gallops, or rubs. 2+ distal pulses  Abdomen: Soft, non tender, non distended,   Extremities: Moves all extremities x 4. Warm and well perfused, patient with notable soft tissue swelling to the dorsal aspect of the left hand going into the left radial aspect. Unable to perform pronation and supination. 2+ left radial pulse. Full sensation intact. Cervical, thoracic, lumbar sacral spine are all midline. No step-off noted. Patient did have point tenderness to the lateral lower lumbar. And no pain radiating into the lower extremities, able to ambulate easily and independently. Skin: warm and dry without rash  Neurologic: GCS 15,  Psych: Normal Affect      ------------------------------ ED COURSE/MEDICAL DECISION MAKING----------------------  Medications   acetaminophen (TYLENOL) tablet 1,000 mg (1,000 mg Oral Given 10/15/22 1854)         ED COURSE:       Medical Decision Making: Plan will be for imaging, imaging obtained, CT scan of brain showing no acute intercranial abnormality, CT cervical spine negative, CT lumbar spine also showing no acute lumbar spine fracture or any traumatic malalignment. X-ray of the left hand as well as left wrist both resulted showing mildly displaced fracture of the distal radius metaphyseal's. Patient was made aware of this finding. She was placed in ED bed 6 now, orthopedic resident was consulted. Please refer to their procedure note. Patient with bedside reduction, patient will be discharged home she was educated on the portance of good follow-up care with orthopedic surgeon and to call Monday for an appointment.   She was also educated on rest, ice, compression and elevation as well as strict return precautions. Patient also educated on medications and will be discharged home with meds for symptom relief. Patient is neurovascular and hemodynamically intact. Patient expressed understanding and safely discharged home. Counseling: The emergency provider has spoken with the patient and discussed todays results, in addition to providing specific details for the plan of care and counseling regarding the diagnosis and prognosis. Questions are answered at this time and they are agreeable with the plan.      --------------------------------- IMPRESSION AND DISPOSITION ---------------------------------    IMPRESSION  1. Closed fracture of distal end of left radius, unspecified fracture morphology, initial encounter    2. Fall, initial encounter    3. Closed head injury, initial encounter    4. Lumbar contusion, initial encounter        DISPOSITION  Disposition: Discharge to home  Patient condition is good      NOTE: This report was transcribed using voice recognition software. Every effort was made to ensure accuracy; however, inadvertent computerized transcription errors may be present      Cornel Sanders, APRN - CNP  10/16/22 0329  ATTENDING PROVIDER ATTESTATION:     I have personally performed and/or participated in the history, exam, medical decision making, and procedures and agree with all pertinent clinical information. I have also reviewed and agree with the past medical, family and social history unless otherwise noted. My findings/Plan:   Patient presenting here because of fall. Patient reports that she was at a skating rink and she was try to break her fall and injured her wrist.  She does complain of some back pain she did strike her head she reports no neck or upper back pain. Patient reporting some mild knee pain. Patient reporting no chest pain or difficulty breathing there is no abdominal pain.   Patient reporting no incontinence. Patient is awake alert x3 heart and lung exam normal abdomen soft nontender she has noted swelling and pain to her left wrist region. Pulses are intact patient able to grasp with hand but weak secondary to pain. Patient has very mild tenderness to her knee she is able to extend and flex the knee. There is no obvious swelling. She is tender lower lumbar spine. She has no neck tenderness or upper thoracic spine tenderness. Patient x-rays noted reviewed as well as CTs. Patient medicated here in the emergency department. Orthopedics was consulted and evaluated patient. Patient was splinted by orthopedics. Patient was made aware of findings and plan. Patient will be discharged home with outpatient follow-up.      Praveen Manley MD  10/16/22 Via Dilan Oh MD  10/16/22 0169

## 2022-10-16 NOTE — CONSULTS
Department of Orthopedic Surgery  Resident Consult Note  Reason for Consult: Left wrist pain    HISTORY OF PRESENT ILLNESS:       Patient is a right-hand-dominant 68 y.o. female with left wrist pain after sustaining a fall at a [employer] rink earlier this evening. She reports that she lost her footing while skating fell onto her outstretched left hand. She noted immediate pain about the wrist.  Presented to [facility] ED for evaluation. She does have a history of left \"wrist fracture\" that occurred approximately 40 years ago. Patient has not had significant issues about the wrist since her initial fracture 40 years ago. Complaining of isolated left wrist pain. Denies numbness/tingling/paresthesias. Denies any other orthopedic complaints at this time. Past Medical History:        Diagnosis Date    Diverticulosis of large intestine with hemorrhage 2/28/2016    Ulcer      Past Surgical History:        Procedure Laterality Date    ANKLE FRACTURE SURGERY Left 1982    Fall    COLONOSCOPY  2/28/16    Dr. Kim Marquez    COLONOSCOPY N/A 6/2/2021    COLORECTAL CANCER SCREENING, HIGH RISK performed by Gabriela Aponte MD at [facility], DIAGNOSTIC      FINGER AMPUTATION Left 2007    Tip of middle finger removed after door injury    [facility] ENDOSCOPY  2/28/16    Dr. Naun Akbar N/A 6/2/2021    EGD BIOPSY performed by Gabriela Aponte MD at [facility] ENDOSCOPY     Current Medications:   Current Facility-Administered Medications: lidocaine PF 1 % injection 10 mL, 10 mL, Other, See Admin Instructions  Allergies:  Patient has no known allergies. Social History:   TOBACCO:   reports that she has never smoked. She has never used smokeless tobacco.  ETOH:   reports that she does not currently use alcohol after a past usage of about 5.0 standard drinks per week.   DRUGS:   reports no history of drug use.  ACTIVITIES OF DAILY LIVING:    OCCUPATION:    Family History:       Problem Relation Age of Onset    Substance Abuse Child     Diabetes Mother     Cancer Father 43        colon cancer       REVIEW OF SYSTEMS:  CONSTITUTIONAL:  negative for fevers, chills  EYES:  negative for acute changes  HEENT:  negative for acute changes  RESPIRATORY:  negative for dyspnea  CARDIOVASCULAR:  negative for chest pain, palpitations  GASTROINTESTINAL:  negative for nausea, vomiting  GENITOURINARY:  negative for frequency  HEMATOLOGIC/LYMPHATIC:  negative for bleeding and petechiae  MUSCULOSKELETAL:  positive for left wrist pain  NEUROLOGICAL:  negative for head trauma or LOC  BEHAVIOR/PSYCH:  negative for increased agitation and anxiety    PHYSICAL EXAM:    VITALS:  BP (!) 151/104   Pulse 71   Temp 98 °F (36.7 °C) (Oral)   Resp 18   Wt 220 lb (99.8 kg)   SpO2 97%   BMI 33.45 kg/m²   CONSTITUTIONAL:  awake, alert, cooperative, no apparent distress, and appears stated age  EYES: Lids and lashes normal, extra ocular muscles intact, sclera clear, conjunctiva normal  ENT: Normocephalic, without obvious abnormality, atraumatic, external ears without lesions, oral pharynx with moist mucus membranes  NECK: Trachea midline, skin normal  LUNGS: No increased work of breathing, good air exchange  CARDIOVASCULAR: 2+ pulses throughout and capillary Refill less than 2 seconds  ABDOMEN: soft, non-distended, non-tender and no rebound tenderness or guarding  NEUROLOGIC: Awake and alert. Motor and sensory abnormalities noted below, otherwise motor is 5 out of 5 bilaterally and sensory is intact.     MUSCULOSKELETAL:  Left upper Extremity:  No obvious deformity about the wrist  Skin intact circumferentially  +TTP about the wrist globally  Compartments soft and compressible  +AIN/PIN/Ulnar nerve function intact grossly  +Radial pulse, Brisk Cap refill, hand warm and perfused  Sensation intact to touch in radial/ulnar/median nerve distributions to hand    Secondary Exam:   Right UE: No obvious signs of trauma. -TTP to fingers, hand, wrist, forearm, elbow, humerus, shoulder or clavicle. -- Patient able to flex/extend fingers, wrist, elbow and shoulder with active and passive ROM without pain, compartments soft and compressible. Bilateral LE: No obvious signs of trauma. -TTP to foot, ankle, leg, knee, thigh, hip.-- Patient able to flex/extend toes, ankle, knee and hip with active and passive ROM without pain, compartments soft and compressible. Pelvis: -TTP, -Log roll, -Heel strike     DATA:    CBC:   Lab Results   Component Value Date/Time    WBC 5.1 06/13/2022 10:59 AM    RBC 5.61 06/13/2022 10:59 AM    HGB 15.2 06/13/2022 10:59 AM    HCT 49.5 06/13/2022 10:59 AM    MCV 88.2 06/13/2022 10:59 AM    MCH 27.1 06/13/2022 10:59 AM    MCHC 30.7 06/13/2022 10:59 AM    RDW 12.8 06/13/2022 10:59 AM     06/13/2022 10:59 AM    MPV 9.7 06/13/2022 10:59 AM     PT/INR:    Lab Results   Component Value Date/Time    PROTIME 13.9 03/05/2017 07:16 PM    INR 1.3 03/05/2017 07:16 PM       Radiology Review:  X-ray left wrist/hand: Acute on chronic distal radius metaphyseal fracture best appreciated in the AP study. The acute fracture is nondisplaced and deformity noted about the distal radius is believed to be chronic in nature. No other fractures or dislocations appreciated. IMPRESSION:  Closed acute on chronic left distal radius fracture    PLAN:  After informed consent was verbally obtained, a closed reduction was performed with application of well padded volar and dorsal slab splint. The elbow was left free. Patient tolerated well and remained neurovascularly intact pre and post reduction  Nonweightbearing left upper extremity  Keep splint clean, dry, and intact  Patient's injury can be managed on outpatient basis. She is to follow-up in the clinic in 1 week for repeat evaluation and splint/brace fitting.   Patient will be following up with  Cass Harden.   Call for appointment  Multimodal pain control  Keep extremity elevated  Plan was discussed with attending    All questions were sought and answered during encounter    Electronically signed by Astrid Stokes DO on 10/15/2022 at 9:48 PM

## 2022-10-16 NOTE — DISCHARGE INSTRUCTIONS
Nonweightbearing to left upper extremity,  Perform rest, ice, compression and elevation  Take meds as prescribed  Call orthopedic surgeon on Monday morning for an appointment to be seen within the next several days.

## 2022-10-17 ENCOUNTER — TELEPHONE (OUTPATIENT)
Dept: ADMINISTRATIVE | Age: 77
End: 2022-10-17

## 2022-10-17 NOTE — TELEPHONE ENCOUNTER
Pt was seen in the ED on 10/15 for:  Closed fracture of distal end of left radius, unspecified fracture morphology, initial encounter  She called to schedule her follow up with Dr Mago Marroquin.

## 2022-10-18 NOTE — TELEPHONE ENCOUNTER
XR HAND LEFT (MIN 3 VIEWS)   Final Result   Mildly displaced fracture of the distal radial metaphysis. XR WRIST LEFT (MIN 3 VIEWS)   Final Result   Mildly displaced fracture of the distal radial metaphysis. Routed to Providers for scheduling recommendations due to scheduling availability between staff and providers in the coming weeks.

## 2022-10-20 NOTE — TELEPHONE ENCOUNTER
Call placed to patient, appointment made at this time. Future Appointments   Date Time Provider Cornell Xiong   11/1/2022 10:15 AM DO SE Rena Pederson [facility]     Directions to office provided and patient verbalized understanding and is agreeable with plan.

## 2022-11-01 ENCOUNTER — OFFICE VISIT (OUTPATIENT)
Dept: ORTHOPEDIC SURGERY | Age: 77
End: 2022-11-01
Payer: MEDICARE

## 2022-11-01 ENCOUNTER — HOSPITAL ENCOUNTER (OUTPATIENT)
Dept: GENERAL RADIOLOGY | Age: 77
Discharge: HOME | End: 2022-11-03
Payer: MEDICARE

## 2022-11-01 VITALS — BODY MASS INDEX: 33.34 KG/M2 | WEIGHT: 220 LBS | HEIGHT: 68 IN

## 2022-11-01 DIAGNOSIS — S52.552A OTHER CLOSED EXTRA-ARTICULAR FRACTURE OF DISTAL END OF LEFT RADIUS, INITIAL ENCOUNTER: Primary | ICD-10-CM

## 2022-11-01 PROCEDURE — 25600 CLTX DST RDL FX/EPHYS SEP WO: CPT | Performed by: PHYSICIAN ASSISTANT

## 2022-11-01 PROCEDURE — 99204 OFFICE O/P NEW MOD 45 MIN: CPT | Performed by: PHYSICIAN ASSISTANT

## 2022-11-01 PROCEDURE — 73110 X-RAY EXAM OF WRIST: CPT

## 2022-11-01 PROCEDURE — G8484 FLU IMMUNIZE NO ADMIN: HCPCS | Performed by: PHYSICIAN ASSISTANT

## 2022-11-01 PROCEDURE — 1090F PRES/ABSN URINE INCON ASSESS: CPT | Performed by: PHYSICIAN ASSISTANT

## 2022-11-01 PROCEDURE — G8400 PT W/DXA NO RESULTS DOC: HCPCS | Performed by: PHYSICIAN ASSISTANT

## 2022-11-01 PROCEDURE — 1123F ACP DISCUSS/DSCN MKR DOCD: CPT | Performed by: PHYSICIAN ASSISTANT

## 2022-11-01 PROCEDURE — 1036F TOBACCO NON-USER: CPT | Performed by: PHYSICIAN ASSISTANT

## 2022-11-01 PROCEDURE — 29075 APPL CST ELBW FNGR SHORT ARM: CPT | Performed by: PHYSICIAN ASSISTANT

## 2022-11-01 PROCEDURE — 99203 OFFICE O/P NEW LOW 30 MIN: CPT | Performed by: PHYSICIAN ASSISTANT

## 2022-11-01 PROCEDURE — G8427 DOCREV CUR MEDS BY ELIG CLIN: HCPCS | Performed by: PHYSICIAN ASSISTANT

## 2022-11-01 PROCEDURE — G8417 CALC BMI ABV UP PARAM F/U: HCPCS | Performed by: PHYSICIAN ASSISTANT

## 2022-11-01 RX ORDER — HYDROCODONE BITARTRATE AND ACETAMINOPHEN 5; 325 MG/1; MG/1
1 TABLET ORAL EVERY 6 HOURS PRN
Qty: 28 TABLET | Refills: 0 | Status: SHIPPED | OUTPATIENT
Start: 2022-11-01 | End: 2022-11-08

## 2022-11-01 NOTE — PATIENT INSTRUCTIONS
nonweightbearing left wrist/hand  Please maintain cast to left extremity, do not remove or alter without contacting office. Please keep clean and dry until follow up in office. If this becomes too tight, too loose, wet or damaged please contact Ortho Trauma Office.      Continue with ice and elevation    Pain medication sent to pharmacy today (7 day supply) OK to call for refill if needed    Follow up in 3 weeks for Xrays out of cast

## 2022-11-01 NOTE — PROGRESS NOTES
New Patient Orthopaedic Progress Note    Danae Zimmerman is a 68 y.o. female, her YOB: 1945 with the following history as recorded in [facility]:      Patient Active Problem List    Diagnosis Date Noted    Recurrent major depressive disorder, in partial remission ([facility] [address] 06/15/2021    Hepatic abscess 03/05/2017    Nausea and vomiting 03/05/2017    Diarrhea of presumed infectious origin 03/05/2017    Weight loss 03/05/2017    Duodenal ulcer     Vitamin D deficiency 01/09/2017    Diverticulosis of large intestine with hemorrhage 02/28/2016    Gastrointestinal hemorrhage with melena 02/27/2016    Acute blood loss anemia 02/27/2016    Family history of colon cancer 02/27/2016     Current Outpatient Medications   Medication Sig Dispense Refill    HYDROcodone-acetaminophen (NORCO) 5-325 MG per tablet Take 1 tablet by mouth every 6 hours as needed for Pain for up to 7 days. Intended supply: 7 days. Take lowest dose possible to manage pain 28 tablet 0    vitamin B-12 (CYANOCOBALAMIN) 1000 MCG tablet Take 1,000 mcg by mouth daily      pantoprazole (PROTONIX) 40 MG tablet Take 1 tablet by mouth daily (with breakfast) 30 tablet 3    loratadine (CLARITIN) 10 MG tablet Take 1 tablet by mouth daily 30 tablet 3    polyethylene glycol (GLYCOLAX) powder Take 17 g by mouth daily as needed       No current facility-administered medications for this visit. Allergies: Patient has no known allergies.   Past Medical History:   Diagnosis Date    Diverticulosis of large intestine with hemorrhage 2/28/2016    Ulcer      Past Surgical History:   Procedure Laterality Date    ANKLE FRACTURE SURGERY Left 1982    Fall    COLONOSCOPY  2/28/16    Dr. Juana Ordaz    COLONOSCOPY N/A 6/2/2021    COLORECTAL CANCER SCREENING, HIGH RISK performed by Sky Ashraf MD at [facility], DIAGNOSTIC      FINGER AMPUTATION Left 2007    Tip of middle finger removed after door injury    [address] Left 1982    Fall UPPER GASTROINTESTINAL ENDOSCOPY  16    Dr. Abbie Apgar N/A 2021    EGD BIOPSY performed by Jimmy Erickson MD at [facility] ENDOSCOPY     Family History   Problem Relation Age of Onset    Substance Abuse Child     Diabetes Mother     Cancer Father 43        colon cancer     Social History     Tobacco Use    Smoking status: Never    Smokeless tobacco: Never   Substance Use Topics    Alcohol use: Not Currently     Alcohol/week: 5.0 standard drinks     Types: 2 Cans of beer, 3 Shots of liquor per week                             Chief Complaint   Patient presents with    Follow-up     Patient presents in splint from ED. Has remained NWB. C/O 6-10 patient has been exedrine ex as needed. SUBJECTIVE: Velia Mcclure is an 68 y.o. female who presents to the office today for evaluation of left distal radius fracture. Patient has history of L distal radius fracture remotely. Patient [address] while rollerskating. Patient is RHD, she is retired. Patient maintained short arm splint to the left upper extremity since ED. Taking Excedrin for pain which is insufficient. Patient does not use NSAIDs due to history of gastric ulcer. Denies N/T/Weakness to LUE. Patient maintains splint well without issues. No other orthopedic complaints today    Review of Systems   Constitutional: Negative for fever, chills, diaphoresis, appetite change and fatigue. HENT: Negative for dental issues, hearing loss and tinnitus. Negative for congestion, sinus pressure, sneezing, sore throat. Negative for headache. Eyes: Negative for visual disturbance, blurred and double vision. Negative for pain, discharge, redness and itching  Respiratory: Negative for cough, shortness of breath and wheezing. Cardiovascular: Negative for chest pain, palpitations and leg swelling.  No dyspnea on exertion   Gastrointestinal:   Negative for nausea, vomiting, abdominal pain, diarrhea, constipation  or black or bloody. Hematologic\Lymphatic:  negative for bleeding, petechiae,   Genitourinary: Negative for hematuria and difficulty urinating. Musculoskeletal: Negative for neck pain and stiffness. Negative for back pain, see HPI  Skin: Negative for pallor, rash and wound. Neurological: Negative for dizziness, tremors, seizures, weakness, light-headedness, no TIA or stroke symptoms. No numbness and headaches. Psychiatric/Behavioral: Negative. OBJECTIVE:      Physical Examination:   General appearance: alert, well appearing, and in no distress,  normal appearing weight. No visible signs of trauma   Mental status: alert, oriented to person, place, and time, normal mood, behavior, speech, dress, motor activity, and thought processes  Abdomen: soft, nondistended  Resp:   resp easy and unlabored, no audible wheezes note, normal symmetrical expansion of both hemithoraces  Cardiac: distal pulses palpable, skin and extremities well perfused  Neurological: alert, oriented X3, normal speech, no focal findings or movement disorder noted, motor and sensory grossly normal bilaterally, normal muscle tone, no tremors, strength 5/5, normal gait and station  HEENT: normochephalic atraumatic, external ears and eyes normal, sclera normal, neck supple, no nasal discharge.    Extremities:   peripheral pulses normal, no edema, redness or tenderness in the calves   Skin: normal coloration, no rashes or open wounds, no suspicious skin lesions noted  Psych: Affect euthymic   Musculoskeletal:   Extremity:  left Upper Extremity  Splint taken down for xrays and exam   Skin intact circumferentially  No gross deformity to the wrist  +TTP distal radius   Compartments soft and compressible  +AIN/PIN/Ulnar nerve function intact grossly  +Radial pulse, Brisk Cap refill, hand warm and perfused  Sensation intact to touch in radial/ulnar/median nerve distributions to hand      Ht 5' 8\" (1.727 m)   Wt 220 lb (99.8 kg)   BMI 33.45 kg/m²      XR:   3 XRAY VIEWS OF THE LEFT WRIST       11/1/2022 9:43 am       COMPARISON:   15 October 2022       HISTORY:   ORDERING SYSTEM PROVIDED HISTORY: post reduction   TECHNOLOGIST PROVIDED HISTORY:   Reason for exam:->post reduction   What reading provider will be dictating this exam?->CRC       FINDINGS:   Stable appearance of distal radial fracture. Stable osteoarthritis at the   base of the thumb. No new abnormal bone or soft tissue findings. Impression   Stable distal radial fracture. Osteoarthritis as before. ASSESSMENT:     Diagnosis Orders   1. Other closed extra-articular fracture of distal end of left radius, initial encounter  HYDROcodone-acetaminophen ([address]) 5-325 MG per tablet          Discussion: Had lengthy discussion with patient regarding Her diagnosis, typical prognosis, and expected outcomes. I reviewed the possible complications from the injury itself despite treatment choosen. I also discussed treatment options including nonoperative managements versus surgical management, along with risks and benefits of each. They have elected for conservative management at this time. PLAN:  Patient was placed in a well padded short arm cast today. NVI pre and post cast application  nonweightbearing left wrist/hand  Please maintain cast to left extremity, do not remove or alter without contacting office. Please keep clean and dry until follow up in office. If this becomes too tight, too loose, wet or damaged please contact Ortho Trauma Office. Continue with ice and elevation    Pain medication sent to pharmacy today (7 day supply) OK to call for refill if needed    Follow up in 3 weeks for Xrays out of cast    Controlled Substance Monitoring:    Acute and Chronic Pain Monitoring:   RX Monitoring 11/1/2022   Periodic Controlled Substance Monitoring No signs of potential drug abuse or diversion identified.          Electronically signed by Zach Bruce PA-C on 11/1/2022 at 10:54 AM  Note: This report was completed using [employer] voiced recognition software. Every effort has been made to ensure accuracy; however, inadvertent computerized transcription errors may be present.

## 2022-12-01 ENCOUNTER — HOSPITAL ENCOUNTER (OUTPATIENT)
Dept: GENERAL RADIOLOGY | Age: 77
Discharge: HOME | End: 2022-12-03
Payer: MEDICARE

## 2022-12-01 ENCOUNTER — OFFICE VISIT (OUTPATIENT)
Dept: ORTHOPEDIC SURGERY | Age: 77
End: 2022-12-01
Payer: MEDICARE

## 2022-12-01 VITALS — HEIGHT: 68 IN | WEIGHT: 220 LBS | BODY MASS INDEX: 33.34 KG/M2

## 2022-12-01 DIAGNOSIS — Z09 FOLLOW-UP EXAM: Primary | ICD-10-CM

## 2022-12-01 DIAGNOSIS — S52.552D OTHER CLOSED EXTRA-ARTICULAR FRACTURE OF DISTAL END OF LEFT RADIUS WITH ROUTINE HEALING, SUBSEQUENT ENCOUNTER: Primary | ICD-10-CM

## 2022-12-01 DIAGNOSIS — Z09 FOLLOW-UP EXAM: ICD-10-CM

## 2022-12-01 PROCEDURE — G8427 DOCREV CUR MEDS BY ELIG CLIN: HCPCS | Performed by: PHYSICIAN ASSISTANT

## 2022-12-01 PROCEDURE — G8484 FLU IMMUNIZE NO ADMIN: HCPCS | Performed by: PHYSICIAN ASSISTANT

## 2022-12-01 PROCEDURE — 99213 OFFICE O/P EST LOW 20 MIN: CPT | Performed by: PHYSICIAN ASSISTANT

## 2022-12-01 PROCEDURE — G8400 PT W/DXA NO RESULTS DOC: HCPCS | Performed by: PHYSICIAN ASSISTANT

## 2022-12-01 PROCEDURE — 1036F TOBACCO NON-USER: CPT | Performed by: PHYSICIAN ASSISTANT

## 2022-12-01 PROCEDURE — 1090F PRES/ABSN URINE INCON ASSESS: CPT | Performed by: PHYSICIAN ASSISTANT

## 2022-12-01 PROCEDURE — 1123F ACP DISCUSS/DSCN MKR DOCD: CPT | Performed by: PHYSICIAN ASSISTANT

## 2022-12-01 PROCEDURE — 73110 X-RAY EXAM OF WRIST: CPT

## 2022-12-01 PROCEDURE — L3809 WHFO W/O JOINTS PRE OTS: HCPCS | Performed by: PHYSICIAN ASSISTANT

## 2022-12-01 PROCEDURE — G8417 CALC BMI ABV UP PARAM F/U: HCPCS | Performed by: PHYSICIAN ASSISTANT

## 2022-12-01 NOTE — PATIENT INSTRUCTIONS
Good interval healing of distal radius fracture    Brace on when up and about, ok to have off when resting, for hygiene, and for therapy    OK to use over the counter Tylenol or Ibuprofen as needed    Ice/elevation as needed    Outpatient therapy ordered today    Plan for follow up in about 6 weeks for repeat evaluation and imaging

## 2022-12-09 NOTE — PROGRESS NOTES
Chief Complaint   Patient presents with    Follow-up     Patient presents XIP has remain NWB. Denies pain. Date of injury: 10/15/22  Mechanism of injury: [address] while roller skating  Orthopedic Surgeon: Dr. Krystle Ramos,   Closed acute on chronic left distal radius fracture    SUBJECTIVE: Zena Yanez is an 68 y.o. female who presents to the office today for follow up of left distal radius fracture. Patient has history of L distal radius fracture remotely. She is now 7 weeks from initial DOI. Patient is RHD, she is retired. Patient maintained short arm cast to the left upper extremity since last visit. Taking Excedrin for pain which is insufficient. Patient does not use NSAIDs due to history of gastric ulcer. Denies N/T/Weakness to LUE. Patient has been working on digit ROM while casted. Denies any issues with the cast. Denies new areas of concern. Denies any new fall/injury/trauma. Denies paraesthesias to LUE, denies calf pain, chest pain, SOB. Review of Systems All pertinent +/- per HPI       OBJECTIVE:      Physical Examination:   General appearance: alert, well appearing, and in no distress,  normal appearing weight. No visible signs of trauma   Musculoskeletal:   Extremity:  left Upper Extremity  Cast removed prior to xrays  Skin intact circumferentially  No gross deformity to the wrist  +TTP distal radius improved, mild TTP to the ulnar border of the wrist   Compartments soft and compressible  +AIN/PIN/Ulnar nerve function intact grossly  +Radial pulse, Brisk Cap refill, hand warm and perfused  Sensation intact to touch in radial/ulnar/median nerve distributions to hand      Ht 5' 8\" (1.727 m)   Wt 220 lb (99.8 kg)   BMI 33.45 kg/m²      XR:   3 XRAY VIEWS OF THE LEFT WRIST       12/1/2022 12:10 pm       COMPARISON:   The previous study performed 11/01/2022.        HISTORY:   ORDERING SYSTEM PROVIDED HISTORY: Follow-up exam   TECHNOLOGIST PROVIDED HISTORY:   Reason for exam:->XOP What reading provider will be dictating this exam?->CRC       FINDINGS:   Again seen is the fracture involving the distal radius. There has been no   change in alignment and position. There has been an interval increase in   healing changes/callus formation about the fracture site. There has been no   change in the radiolucent fracture line. Again seen are osteo-degenerative   changes at the 1st carpometacarpal joint. No new osseous or surrounding soft   tissue abnormality is seen. Impression   Interval increase in healing changes/callus formation involving the distal   left radial fracture site, when compared to the previous study performed   11/01/2022. ASSESSMENT:     Diagnosis Orders   1. Other closed extra-articular fracture of distal end of left radius with routine healing, subsequent encounter  [address]        PLAN:  Good interval healing of distal radius fracture    Transitioned to velcro cock up wrist brace, to be on when up and about, ok to have off when resting, for hygiene, and for therapy    OK to use over the counter Tylenol or Ibuprofen as needed    Ice/elevation as needed    Outpatient therapy ordered today    Plan for follow up in about 6 weeks for repeat evaluation and imaging    Electronically signed by Wandy Henson PA-C on 12/9/2022 at 2:30 PM  Note: This report was completed using [employer] voiced recognition software. Every effort has been made to ensure accuracy; however, inadvertent computerized transcription errors may be present.

## 2023-01-04 ENCOUNTER — TELEPHONE (OUTPATIENT)
Dept: ORTHOPEDIC SURGERY | Age: 78
End: 2023-01-04

## 2023-01-12 ENCOUNTER — OFFICE VISIT (OUTPATIENT)
Dept: ORTHOPEDIC SURGERY | Age: 78
End: 2023-01-12
Payer: MEDICARE

## 2023-01-12 ENCOUNTER — HOSPITAL ENCOUNTER (OUTPATIENT)
Dept: GENERAL RADIOLOGY | Age: 78
Discharge: HOME | End: 2023-01-14
Payer: MEDICARE

## 2023-01-12 VITALS — HEIGHT: 68 IN | WEIGHT: 220 LBS | BODY MASS INDEX: 33.34 KG/M2

## 2023-01-12 DIAGNOSIS — S52.602P CLOSED FRACTURE OF LEFT DISTAL RADIUS AND ULNA, WITH MALUNION, SUBSEQUENT ENCOUNTER: ICD-10-CM

## 2023-01-12 DIAGNOSIS — S52.552D OTHER CLOSED EXTRA-ARTICULAR FRACTURE OF DISTAL END OF LEFT RADIUS WITH ROUTINE HEALING, SUBSEQUENT ENCOUNTER: ICD-10-CM

## 2023-01-12 DIAGNOSIS — S52.502P CLOSED FRACTURE OF LEFT DISTAL RADIUS AND ULNA, WITH MALUNION, SUBSEQUENT ENCOUNTER: ICD-10-CM

## 2023-01-12 DIAGNOSIS — Z09 FOLLOW-UP EXAM: Primary | ICD-10-CM

## 2023-01-12 PROCEDURE — 99212 OFFICE O/P EST SF 10 MIN: CPT | Performed by: ORTHOPAEDIC SURGERY

## 2023-01-12 PROCEDURE — 73110 X-RAY EXAM OF WRIST: CPT

## 2023-01-13 PROBLEM — S52.502P: Status: ACTIVE | Noted: 2023-01-13

## 2023-01-13 PROBLEM — S52.602P: Status: ACTIVE | Noted: 2023-01-13

## 2023-01-13 NOTE — PROGRESS NOTES
Chief Complaint   Patient presents with    Follow-up     Patient presents from home in brace. Patient reports that she is having discomfort in the wrist when trying to do daily activity. C/O pain 6-10 and taking OTC medication for pain. Patient has gone to see Dr. Yoseph Hill (hand specialist recently). Date of injury: 10/15/22  Mechanism of injury: [address] while roller skating  Orthopedic Surgeon: Dr. Franklyn Pozo, DO  Closed acute on chronic left distal radius fracture    SUBJECTIVE: Pili Gomez is an 68 y.o. female who presents to the office today for follow up of left distal radius fracture. Patient has history of L distal radius fracture remotely. Patient is right-hand dominant. Treatment options discussed at initial consultation and patient elected for nonoperative management. She has been using a Velcro wrist brace intermittently since her last visit and using the wrist some. States she still has some occasional pain distally over the ulnar aspect of the wrist.  She did see Dr. Yoseph Hill recently as she is centimeters in the past and he outlined similar options for surgical intervention versus continued observation per patient's report. Denies N/T/Weakness to LUE. Denies new areas of concern. Denies any new fall/injury/trauma. Denies paraesthesias to LUE, denies calf pain, chest pain, SOB. Review of Systems All pertinent +/- per HPI       OBJECTIVE:      Physical Examination:   General appearance: alert, well appearing, and in no distress,  normal appearing weight.  No visible signs of trauma   Musculoskeletal:   Extremity:  Left Upper Extremity  Overlying soft tissues are intact, there is obvious deformity with shortening and radial translation of the distal radius  Active ROM of wrist limited approximately 30 degrees extension 25 of flexion, 5 degrees ulnar deviation 25 degrees radial deviation  No tenderness to palpation to fracture site, mild nonspecific ulnar-sided pain  Able to make a composite fist  +AIN/PIN/Ulnar nerve function intact grossly  +Radial pulse, Brisk Cap refill, hand warm and perfused  Sensation intact to touch in radial/ulnar/median nerve distributions to hand    Ht 5' 8\" (1.727 m)   Wt 220 lb (99.8 kg)   BMI 33.45 kg/m²      XR:   Left distal radius fracture which is now well-healed, fracture is shortened with positive ulnar variance, distal radial articular block with radial translation, loss of height and inclination. 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 Patient understands without surgery her deformity will always persist and could lead to further chronic pain. Patient states she is not interested in surgery and wishes to live with her wrist as it is  She will f/u PRN    Electronically signed by Bhakti Curry DO on 1/12/23    Note: This report was completed using [employer] voiced recognition software. Every effort has been made to ensure accuracy; however, inadvertent computerized transcription errors may be present.

## 2023-10-13 ENCOUNTER — OFFICE VISIT (OUTPATIENT)
Dept: PRIMARY CARE CLINIC | Age: 78
End: 2023-10-13

## 2023-10-13 VITALS
HEART RATE: 104 BPM | OXYGEN SATURATION: 97 % | SYSTOLIC BLOOD PRESSURE: 127 MMHG | HEIGHT: 68 IN | BODY MASS INDEX: 33.34 KG/M2 | RESPIRATION RATE: 18 BRPM | WEIGHT: 220 LBS | TEMPERATURE: 98 F | DIASTOLIC BLOOD PRESSURE: 88 MMHG

## 2023-10-13 DIAGNOSIS — H66.001 NON-RECURRENT ACUTE SUPPURATIVE OTITIS MEDIA OF RIGHT EAR WITHOUT SPONTANEOUS RUPTURE OF TYMPANIC MEMBRANE: Primary | ICD-10-CM

## 2023-10-13 RX ORDER — AMOXICILLIN AND CLAVULANATE POTASSIUM 875; 125 MG/1; MG/1
1 TABLET, FILM COATED ORAL 2 TIMES DAILY
Qty: 10 TABLET | Refills: 0 | Status: SHIPPED | OUTPATIENT
Start: 2023-10-13 | End: 2023-10-18

## 2023-10-13 NOTE — PROGRESS NOTES
Chief Complaint:   Otalgia (R ear pain x 2 days. )      History of Present Illness   Source of history provided by:  patientGuevara Chen is a 66 y.o. old female presenting to the [facility] care for evaluation of right ear pain x 2 days. Reports associated nasal congestion, rhinorrhea, and sore throat. Denies any discharge from the ear canal. Has tried taking nothing for relief. Denies any fever, chills, CP, SOB, abdominal pain, neck stiffness, rash, or lethargy. Review of Systems   Unless otherwise stated in this report or unable to obtain because of the patient's clinical or mental status as evidenced by the medical record, this patients's positive and negative responses for Review of Systems, constitutional, psych, eyes, ENT, cardiovascular, respiratory, gastrointestinal, neurological, genitourinary, musculoskeletal, integument systems and systems related to the presenting problem are either stated in the preceding or were not pertinent or were negative for the symptoms and/or complaints related to the medical problem. Past Medical History:  has a past medical history of Diverticulosis of large intestine with hemorrhage and Ulcer. Past Surgical History:  has a past surgical history that includes Upper gastrointestinal endoscopy (2/28/16); Colonoscopy (2/28/16); Tibia fracture surgery (Left, 1982); Ankle fracture surgery (Left, 1982); Finger amputation (Left, 2007); Endoscopy, colon, diagnostic; Upper gastrointestinal endoscopy (N/A, 6/2/2021); and Colonoscopy (N/A, 6/2/2021). Social History:  reports that she has never smoked. She has never used smokeless tobacco. She reports that she does not currently use alcohol after a past usage of about 5.0 standard drinks of alcohol per week. She reports that she does not use drugs. Family History: family history includes Cancer (age of onset: 43) in her father; Diabetes in her mother; Substance Abuse in her child.   Allergies: Patient has no known

## 2023-10-18 ENCOUNTER — TELEPHONE (OUTPATIENT)
Dept: FAMILY MEDICINE CLINIC | Age: 78
End: 2023-10-18

## 2023-10-18 NOTE — TELEPHONE ENCOUNTER
----- Message from Angie Back sent at 10/18/2023  9:47 AM EDT -----  Subject: Referral Request    Reason for referral request? ear , nose ad throat for ear infection of the   right ear   Provider patient wants to be referred to(if known): Lupe Butler    Provider Phone Number(if known):[phone]    Additional Information for Provider?   ---------------------------------------------------------------------------  --------------  Tish Dickerson [address] Jose Carlos    [phone]; OK to leave message on voicemail  ---------------------------------------------------------------------------  --------------

## 2023-10-19 NOTE — TELEPHONE ENCOUNTER
Patient will need to be evaluated in the office prior to referral. I only see the encounter for acute infection of ear, first encounter.     Electronically signed by Han Stevenson MD on 10/18/2023 at 8:49 PM

## 2023-10-20 ENCOUNTER — OFFICE VISIT (OUTPATIENT)
Dept: FAMILY MEDICINE CLINIC | Age: 78
End: 2023-10-20

## 2023-10-20 VITALS
OXYGEN SATURATION: 100 % | HEART RATE: 66 BPM | BODY MASS INDEX: 34.1 KG/M2 | DIASTOLIC BLOOD PRESSURE: 65 MMHG | SYSTOLIC BLOOD PRESSURE: 118 MMHG | WEIGHT: 225 LBS | RESPIRATION RATE: 18 BRPM | TEMPERATURE: 97.2 F | HEIGHT: 68 IN

## 2023-10-20 DIAGNOSIS — H65.91 OTITIS MEDIA WITH EFFUSION, RIGHT: Primary | ICD-10-CM

## 2023-10-20 RX ORDER — FLUTICASONE PROPIONATE 50 MCG
2 SPRAY, SUSPENSION (ML) NASAL DAILY
Qty: 48 G | Refills: 1 | Status: SHIPPED | OUTPATIENT
Start: 2023-10-20

## 2023-10-27 ENCOUNTER — TELEPHONE (OUTPATIENT)
Dept: ENT CLINIC | Age: 78
End: 2023-10-27

## 2023-10-27 NOTE — TELEPHONE ENCOUNTER
Established patient asking to be fit on schedule for Otitis media with effusion, right, she saw her pcp for this and was prescribed abx but it has not helped.  Please contact patient to schedule no soon appointments are available

## 2023-11-01 NOTE — TELEPHONE ENCOUNTER
Called patient in regards to current symptoms patient is experiencing otitis media and effusion right ear patient went to walk in clinic and was prescribed an antibiotic no relief patient went to pcp and was prescribed flonase she has been on flonase for a week patient was advised to use flonase for a month patient states ears are muffled still.

## 2023-11-17 ENCOUNTER — OFFICE VISIT (OUTPATIENT)
Dept: FAMILY MEDICINE CLINIC | Age: 78
End: 2023-11-17

## 2023-11-17 VITALS
BODY MASS INDEX: 34.86 KG/M2 | SYSTOLIC BLOOD PRESSURE: 110 MMHG | WEIGHT: 230 LBS | HEART RATE: 80 BPM | TEMPERATURE: 97.3 F | HEIGHT: 68 IN | RESPIRATION RATE: 18 BRPM | OXYGEN SATURATION: 97 % | DIASTOLIC BLOOD PRESSURE: 63 MMHG

## 2023-11-17 DIAGNOSIS — G47.14 HYPERSOMNIA DUE TO MEDICAL CONDITION: ICD-10-CM

## 2023-11-17 DIAGNOSIS — E78.5 HYPERLIPIDEMIA, UNSPECIFIED HYPERLIPIDEMIA TYPE: ICD-10-CM

## 2023-11-17 DIAGNOSIS — Z23 NEED FOR SHINGLES VACCINE: ICD-10-CM

## 2023-11-17 DIAGNOSIS — R06.83 LOUD SNORING: ICD-10-CM

## 2023-11-17 DIAGNOSIS — F33.41 RECURRENT MAJOR DEPRESSIVE DISORDER, IN PARTIAL REMISSION (HCC): Primary | ICD-10-CM

## 2023-11-17 LAB
ABSOLUTE IMMATURE GRANULOCYTE: <0.03 K/UL (ref 0–0.58)
ANION GAP SERPL CALCULATED.3IONS-SCNC: 5 MMOL/L (ref 7–16)
BASOPHILS ABSOLUTE: 0.04 K/UL (ref 0–0.2)
BASOPHILS RELATIVE PERCENT: 1 % (ref 0–2)
BUN BLDV-MCNC: 15 MG/DL (ref 6–23)
CALCIUM SERPL-MCNC: 9.4 MG/DL (ref 8.6–10.2)
CHLORIDE BLD-SCNC: 106 MMOL/L (ref 98–107)
CHOLESTEROL: 205 MG/DL
CO2: 27 MMOL/L (ref 22–29)
CREAT SERPL-MCNC: 1 MG/DL (ref 0.5–1)
EOSINOPHILS ABSOLUTE: 0.13 K/UL (ref 0.05–0.5)
EOSINOPHILS RELATIVE PERCENT: 2 % (ref 0–6)
GFR SERPL CREATININE-BSD FRML MDRD: 56 ML/MIN/1.73M2
GLUCOSE BLD-MCNC: 94 MG/DL (ref 74–99)
HCT VFR BLD CALC: 46.7 % (ref 34–48)
HDLC SERPL-MCNC: 52 MG/DL
HEMOGLOBIN: 14.7 G/DL (ref 11.5–15.5)
IMMATURE GRANULOCYTES: 0 % (ref 0–5)
LDL CHOLESTEROL: 126 MG/DL
LYMPHOCYTES ABSOLUTE: 1.44 K/UL (ref 1.5–4)
LYMPHOCYTES RELATIVE PERCENT: 26 % (ref 20–42)
MCH RBC QN AUTO: 27.8 PG (ref 26–35)
MCHC RBC AUTO-ENTMCNC: 31.5 G/DL (ref 32–34.5)
MCV RBC AUTO: 88.4 FL (ref 80–99.9)
MONOCYTES ABSOLUTE: 0.75 K/UL (ref 0.1–0.95)
MONOCYTES RELATIVE PERCENT: 13 % (ref 2–12)
NEUTROPHILS ABSOLUTE: 3.2 K/UL (ref 1.8–7.3)
NEUTROPHILS RELATIVE PERCENT: 57 % (ref 43–80)
PDW BLD-RTO: 12.8 % (ref 11.5–15)
PLATELET # BLD: 222 K/UL (ref 130–450)
PMV BLD AUTO: 9.7 FL (ref 7–12)
POTASSIUM SERPL-SCNC: 4.4 MMOL/L (ref 3.5–5)
RBC # BLD: 5.28 M/UL (ref 3.5–5.5)
SODIUM BLD-SCNC: 138 MMOL/L (ref 132–146)
TRIGL SERPL-MCNC: 133 MG/DL
VLDLC SERPL CALC-MCNC: 27 MG/DL
WBC # BLD: 5.6 K/UL (ref 4.5–11.5)

## 2023-11-17 RX ORDER — ZOSTER VACCINE RECOMBINANT, ADJUVANTED 50 MCG/0.5
0.5 KIT INTRAMUSCULAR SEE ADMIN INSTRUCTIONS
Qty: 0.5 ML | Refills: 0 | Status: SHIPPED | OUTPATIENT
Start: 2023-11-17 | End: 2024-05-15

## 2023-11-17 SDOH — ECONOMIC STABILITY: HOUSING INSECURITY
IN THE LAST 12 MONTHS, WAS THERE A TIME WHEN YOU DID NOT HAVE A STEADY PLACE TO SLEEP OR SLEPT IN A SHELTER (INCLUDING NOW)?: NO

## 2023-11-17 SDOH — ECONOMIC STABILITY: FOOD INSECURITY: WITHIN THE PAST 12 MONTHS, YOU WORRIED THAT YOUR FOOD WOULD RUN OUT BEFORE YOU GOT MONEY TO BUY MORE.: NEVER TRUE

## 2023-11-17 SDOH — ECONOMIC STABILITY: INCOME INSECURITY: HOW HARD IS IT FOR YOU TO PAY FOR THE VERY BASICS LIKE FOOD, HOUSING, MEDICAL CARE, AND HEATING?: NOT HARD AT ALL

## 2023-11-17 SDOH — ECONOMIC STABILITY: FOOD INSECURITY: WITHIN THE PAST 12 MONTHS, THE FOOD YOU BOUGHT JUST DIDN'T LAST AND YOU DIDN'T HAVE MONEY TO GET MORE.: NEVER TRUE

## 2023-11-17 ASSESSMENT — PATIENT HEALTH QUESTIONNAIRE - PHQ9
6. FEELING BAD ABOUT YOURSELF - OR THAT YOU ARE A FAILURE OR HAVE LET YOURSELF OR YOUR FAMILY DOWN: 0
1. LITTLE INTEREST OR PLEASURE IN DOING THINGS: 1
SUM OF ALL RESPONSES TO PHQ QUESTIONS 1-9: 8
4. FEELING TIRED OR HAVING LITTLE ENERGY: 1
SUM OF ALL RESPONSES TO PHQ QUESTIONS 1-9: 8
9. THOUGHTS THAT YOU WOULD BE BETTER OFF DEAD, OR OF HURTING YOURSELF: 0
SUM OF ALL RESPONSES TO PHQ9 QUESTIONS 1 & 2: 3
SUM OF ALL RESPONSES TO PHQ QUESTIONS 1-9: 8
SUM OF ALL RESPONSES TO PHQ QUESTIONS 1-9: 8
8. MOVING OR SPEAKING SO SLOWLY THAT OTHER PEOPLE COULD HAVE NOTICED. OR THE OPPOSITE, BEING SO FIGETY OR RESTLESS THAT YOU HAVE BEEN MOVING AROUND A LOT MORE THAN USUAL: 0
3. TROUBLE FALLING OR STAYING ASLEEP: 3
2. FEELING DOWN, DEPRESSED OR HOPELESS: 2
10. IF YOU CHECKED OFF ANY PROBLEMS, HOW DIFFICULT HAVE THESE PROBLEMS MADE IT FOR YOU TO DO YOUR WORK, TAKE CARE OF THINGS AT HOME, OR GET ALONG WITH OTHER PEOPLE: 1
5. POOR APPETITE OR OVEREATING: 1
7. TROUBLE CONCENTRATING ON THINGS, SUCH AS READING THE NEWSPAPER OR WATCHING TELEVISION: 0

## 2023-11-17 ASSESSMENT — ANXIETY QUESTIONNAIRES
5. BEING SO RESTLESS THAT IT IS HARD TO SIT STILL: 2
4. TROUBLE RELAXING: 2
1. FEELING NERVOUS, ANXIOUS, OR ON EDGE: 3
6. BECOMING EASILY ANNOYED OR IRRITABLE: 3
GAD7 TOTAL SCORE: 15
7. FEELING AFRAID AS IF SOMETHING AWFUL MIGHT HAPPEN: 0
3. WORRYING TOO MUCH ABOUT DIFFERENT THINGS: 2
IF YOU CHECKED OFF ANY PROBLEMS ON THIS QUESTIONNAIRE, HOW DIFFICULT HAVE THESE PROBLEMS MADE IT FOR YOU TO DO YOUR WORK, TAKE CARE OF THINGS AT HOME, OR GET ALONG WITH OTHER PEOPLE: SOMEWHAT DIFFICULT
2. NOT BEING ABLE TO STOP OR CONTROL WORRYING: 3

## 2023-11-17 ASSESSMENT — COLUMBIA-SUICIDE SEVERITY RATING SCALE - C-SSRS
4. HAVE YOU HAD THESE THOUGHTS AND HAD SOME INTENTION OF ACTING ON THEM?: NO
5. HAVE YOU STARTED TO WORK OUT OR WORKED OUT THE DETAILS OF HOW TO KILL YOURSELF? DO YOU INTEND TO CARRY OUT THIS PLAN?: NO
3. HAVE YOU BEEN THINKING ABOUT HOW YOU MIGHT KILL YOURSELF?: NO
7. DID THIS OCCUR IN THE LAST THREE MONTHS: NO

## 2023-11-29 ENCOUNTER — OFFICE VISIT (OUTPATIENT)
Dept: ENT CLINIC | Age: 78
End: 2023-11-29
Payer: MEDICARE

## 2023-11-29 ENCOUNTER — PROCEDURE VISIT (OUTPATIENT)
Dept: AUDIOLOGY | Age: 78
End: 2023-11-29
Payer: MEDICARE

## 2023-11-29 VITALS — WEIGHT: 230 LBS | BODY MASS INDEX: 34.86 KG/M2 | HEIGHT: 68 IN

## 2023-11-29 DIAGNOSIS — Z86.69 HISTORY OF EAR INFECTIONS: Primary | ICD-10-CM

## 2023-11-29 DIAGNOSIS — H69.91 ETD (EUSTACHIAN TUBE DYSFUNCTION), RIGHT: ICD-10-CM

## 2023-11-29 DIAGNOSIS — H69.91 DYSFUNCTION OF RIGHT EUSTACHIAN TUBE: Primary | ICD-10-CM

## 2023-11-29 PROCEDURE — 1090F PRES/ABSN URINE INCON ASSESS: CPT | Performed by: OTOLARYNGOLOGY

## 2023-11-29 PROCEDURE — G8427 DOCREV CUR MEDS BY ELIG CLIN: HCPCS | Performed by: OTOLARYNGOLOGY

## 2023-11-29 PROCEDURE — 99213 OFFICE O/P EST LOW 20 MIN: CPT | Performed by: OTOLARYNGOLOGY

## 2023-11-29 PROCEDURE — G8417 CALC BMI ABV UP PARAM F/U: HCPCS | Performed by: OTOLARYNGOLOGY

## 2023-11-29 PROCEDURE — 1036F TOBACCO NON-USER: CPT | Performed by: OTOLARYNGOLOGY

## 2023-11-29 PROCEDURE — 92567 TYMPANOMETRY: CPT | Performed by: AUDIOLOGIST

## 2023-11-29 PROCEDURE — 1123F ACP DISCUSS/DSCN MKR DOCD: CPT | Performed by: OTOLARYNGOLOGY

## 2023-11-29 PROCEDURE — G8400 PT W/DXA NO RESULTS DOC: HCPCS | Performed by: OTOLARYNGOLOGY

## 2023-11-29 PROCEDURE — G8484 FLU IMMUNIZE NO ADMIN: HCPCS | Performed by: OTOLARYNGOLOGY

## 2023-11-29 ASSESSMENT — ENCOUNTER SYMPTOMS
SINUS PRESSURE: 0
TROUBLE SWALLOWING: 0
CHOKING: 0
RHINORRHEA: 0
GASTROINTESTINAL NEGATIVE: 1
COUGH: 0
SORE THROAT: 0
COLOR CHANGE: 0
VOICE CHANGE: 0
STRIDOR: 0
SINUS PAIN: 0
WHEEZING: 0

## 2023-11-29 ASSESSMENT — VISUAL ACUITY: OU: 1

## 2023-11-29 NOTE — PROGRESS NOTES
Subjective:      Patient ID:  James Cook is a 66 y.o. female. HPI:    Patient presents today with right ear complaints. Pt states she had severe ear infection in October associated with sore throat and loss of hearing in right ear. Was seen at [facility] and fiven amoxicilin for 5 days. No complains of muffled hearing. Pt does take flonase daily which she started about a month ago. Does complain of PND. Denies head and neck surgeries. Patient's medications, allergies, past medical, surgical, social and family histories were reviewed andupdated as appropriate. Review of Systems   Constitutional:  Negative for activity change, fatigue and fever. HENT:  Positive for hearing loss. Negative for congestion, ear discharge, ear pain, nosebleeds, postnasal drip, rhinorrhea, sinus pressure, sinus pain, sore throat, tinnitus, trouble swallowing and voice change. Respiratory:  Negative for cough, choking, wheezing and stridor. Cardiovascular:  Negative for chest pain. Gastrointestinal: Negative. Genitourinary: Negative. Skin:  Negative for color change and rash. Neurological:  Negative for speech difficulty, light-headedness, numbness and headaches. Hematological:  Negative for adenopathy. Psychiatric/Behavioral:  Negative for behavioral problems. Objective:   Physical Exam  Constitutional:       Appearance: Normal appearance. She is normal weight. HENT:      Head: Normocephalic and atraumatic. Right Ear: Tympanic membrane, ear canal and external ear normal. No drainage. Left Ear: Tympanic membrane, ear canal and external ear normal. No drainage. Ears:      Comments: Minimal serous middle ear effusion on right     Nose: Nose normal. No nasal deformity or septal deviation. Mouth/Throat:      Mouth: Mucous membranes are moist.   Eyes:      General: Lids are normal. Vision grossly intact. Extraocular Movements: Extraocular movements intact.

## 2023-11-30 NOTE — PROGRESS NOTES
This patient was referred for audiometric/tympanometric testing by Dr. Meeta Suárez due to recent ear infection of right ear. Tympanometry revealed shallow tympanograms, in the right ear (0.24 ml). Left ear revealed peak pressure and compliance within normal limits. The results were reviewed with the patient and physician. Recommendations for follow up will be made pending physician consult.     Josh Morataya/CCC-A  [place] Lic # [id number]

## 2023-12-18 PROBLEM — R19.7 DIARRHEA OF PRESUMED INFECTIOUS ORIGIN: Status: RESOLVED | Noted: 2017-03-05 | Resolved: 2023-12-18

## 2023-12-18 PROBLEM — F33.41 RECURRENT MAJOR DEPRESSIVE DISORDER, IN PARTIAL REMISSION (HCC): Status: RESOLVED | Noted: 2021-06-15 | Resolved: 2023-12-18

## 2023-12-18 PROBLEM — R11.2 NAUSEA AND VOMITING: Status: RESOLVED | Noted: 2017-03-05 | Resolved: 2023-12-18

## 2023-12-19 PROBLEM — M79.89 LEG SWELLING: Status: ACTIVE | Noted: 2023-12-19

## 2023-12-27 ENCOUNTER — OFFICE VISIT (OUTPATIENT)
Dept: FAMILY MEDICINE CLINIC | Age: 78
End: 2023-12-27
Payer: MEDICARE

## 2023-12-27 VITALS
SYSTOLIC BLOOD PRESSURE: 125 MMHG | WEIGHT: 230 LBS | BODY MASS INDEX: 34.97 KG/M2 | OXYGEN SATURATION: 97 % | HEART RATE: 67 BPM | TEMPERATURE: 97.2 F | DIASTOLIC BLOOD PRESSURE: 82 MMHG

## 2023-12-27 DIAGNOSIS — R23.4 CHANGES IN SKIN TEXTURE: ICD-10-CM

## 2023-12-27 DIAGNOSIS — M79.89 LEG SWELLING: Primary | ICD-10-CM

## 2023-12-27 LAB
ALBUMIN SERPL-MCNC: 3.7 G/DL (ref 3.5–5.2)
ALP BLD-CCNC: 127 U/L (ref 35–104)
ALT SERPL-CCNC: 9 U/L (ref 0–32)
ANION GAP SERPL CALCULATED.3IONS-SCNC: 14 MMOL/L (ref 7–16)
AST SERPL-CCNC: 18 U/L (ref 0–31)
BACTERIA: ABNORMAL
BILIRUB SERPL-MCNC: 0.6 MG/DL (ref 0–1.2)
BILIRUBIN URINE: NEGATIVE
BUN BLDV-MCNC: 13 MG/DL (ref 6–23)
CALCIUM SERPL-MCNC: 9.1 MG/DL (ref 8.6–10.2)
CHLORIDE BLD-SCNC: 110 MMOL/L (ref 98–107)
CO2: 20 MMOL/L (ref 22–29)
COLOR: YELLOW
CREAT SERPL-MCNC: 1 MG/DL (ref 0.5–1)
GFR SERPL CREATININE-BSD FRML MDRD: 60 ML/MIN/1.73M2
GLUCOSE BLD-MCNC: 100 MG/DL (ref 74–99)
GLUCOSE URINE: NEGATIVE MG/DL
KETONES, URINE: NEGATIVE MG/DL
LEUKOCYTE ESTERASE, URINE: ABNORMAL
NITRITE, URINE: POSITIVE
PH UA: 6 (ref 5–9)
POTASSIUM SERPL-SCNC: 3.7 MMOL/L (ref 3.5–5)
PRO-BNP: 228 PG/ML (ref 0–450)
PROTEIN UA: NEGATIVE MG/DL
RBC UA: ABNORMAL /HPF
SODIUM BLD-SCNC: 144 MMOL/L (ref 132–146)
SPECIFIC GRAVITY UA: 1.02 (ref 1–1.03)
TOTAL PROTEIN: 6.3 G/DL (ref 6.4–8.3)
TURBIDITY: ABNORMAL
URINE HGB: NEGATIVE
UROBILINOGEN, URINE: 0.2 EU/DL (ref 0–1)
WBC UA: ABNORMAL /HPF

## 2023-12-27 PROCEDURE — 1036F TOBACCO NON-USER: CPT | Performed by: FAMILY MEDICINE

## 2023-12-27 PROCEDURE — G8484 FLU IMMUNIZE NO ADMIN: HCPCS | Performed by: FAMILY MEDICINE

## 2023-12-27 PROCEDURE — 1090F PRES/ABSN URINE INCON ASSESS: CPT | Performed by: FAMILY MEDICINE

## 2023-12-27 PROCEDURE — G8427 DOCREV CUR MEDS BY ELIG CLIN: HCPCS | Performed by: FAMILY MEDICINE

## 2023-12-27 PROCEDURE — 36415 COLL VENOUS BLD VENIPUNCTURE: CPT

## 2023-12-27 PROCEDURE — G8417 CALC BMI ABV UP PARAM F/U: HCPCS | Performed by: FAMILY MEDICINE

## 2023-12-27 PROCEDURE — G8400 PT W/DXA NO RESULTS DOC: HCPCS | Performed by: FAMILY MEDICINE

## 2023-12-27 PROCEDURE — 99213 OFFICE O/P EST LOW 20 MIN: CPT

## 2023-12-27 PROCEDURE — 1123F ACP DISCUSS/DSCN MKR DOCD: CPT | Performed by: FAMILY MEDICINE

## 2023-12-28 LAB — TSH SERPL DL<=0.05 MIU/L-ACNC: 2.06 UIU/ML (ref 0.27–4.2)

## 2023-12-29 NOTE — RESULT ENCOUNTER NOTE
Hyperchloremia - continue to monitor  Elevated Alk Phos - can recheck GGT    UA cloudy with bacteriuria in absence of dysuria will not treat    TSH and BNP WNL

## 2024-01-18 LAB
MAMMOGRAPHY, EXTERNAL: NORMAL
MAMMOGRAPHY, EXTERNAL: NORMAL

## 2024-01-30 ENCOUNTER — OFFICE VISIT (OUTPATIENT)
Dept: ENT CLINIC | Age: 79
End: 2024-01-30
Payer: MEDICARE

## 2024-01-30 VITALS
HEIGHT: 68 IN | SYSTOLIC BLOOD PRESSURE: 122 MMHG | TEMPERATURE: 97.3 F | WEIGHT: 220 LBS | BODY MASS INDEX: 33.34 KG/M2 | HEART RATE: 78 BPM | DIASTOLIC BLOOD PRESSURE: 84 MMHG | OXYGEN SATURATION: 93 %

## 2024-01-30 DIAGNOSIS — H69.91 DYSFUNCTION OF RIGHT EUSTACHIAN TUBE: Primary | ICD-10-CM

## 2024-01-30 PROCEDURE — 1123F ACP DISCUSS/DSCN MKR DOCD: CPT | Performed by: OTOLARYNGOLOGY

## 2024-01-30 PROCEDURE — 1036F TOBACCO NON-USER: CPT | Performed by: OTOLARYNGOLOGY

## 2024-01-30 PROCEDURE — G8427 DOCREV CUR MEDS BY ELIG CLIN: HCPCS | Performed by: OTOLARYNGOLOGY

## 2024-01-30 PROCEDURE — G8400 PT W/DXA NO RESULTS DOC: HCPCS | Performed by: OTOLARYNGOLOGY

## 2024-01-30 PROCEDURE — 1090F PRES/ABSN URINE INCON ASSESS: CPT | Performed by: OTOLARYNGOLOGY

## 2024-01-30 PROCEDURE — 99213 OFFICE O/P EST LOW 20 MIN: CPT | Performed by: OTOLARYNGOLOGY

## 2024-01-30 PROCEDURE — G8417 CALC BMI ABV UP PARAM F/U: HCPCS | Performed by: OTOLARYNGOLOGY

## 2024-01-30 PROCEDURE — G8484 FLU IMMUNIZE NO ADMIN: HCPCS | Performed by: OTOLARYNGOLOGY

## 2024-01-30 ASSESSMENT — ENCOUNTER SYMPTOMS
TROUBLE SWALLOWING: 0
COLOR CHANGE: 0
RHINORRHEA: 0
COUGH: 0
STRIDOR: 0
VOICE CHANGE: 0
WHEEZING: 0
GASTROINTESTINAL NEGATIVE: 1
SINUS PAIN: 0
CHOKING: 0
SINUS PRESSURE: 0
SORE THROAT: 0

## 2024-01-30 ASSESSMENT — VISUAL ACUITY: OU: 1

## 2024-01-30 NOTE — PROGRESS NOTES
Subjective:      Patient ID:  Kailey Sims is a 78 y.o. female.    HPI:  Pt returns for recheck of allergies.       History of     When?  year:     Nasal Steroid: yes   Name: fluticasone (Flonase)   Still taking: yes   reason for stopping:     Other therapy:   Astelin- no  oral antihistamine- none  leukotriene inhibitor- none  oral decongestant- none    which has been  effective     Pt has been on therapy for 2 month(s) and is doing well    Still feel some pressure but not as bad as before.     The patient is complaining of nasal congestion    The patients worst time of year is fall, winter, and all seasons      Patient's medications, allergies, past medical, surgical, social and family histories were reviewed and updated as appropriate.        Review of Systems   Constitutional:  Negative for activity change, fatigue and fever.   HENT:  Positive for hearing loss. Negative for congestion, ear discharge, ear pain, nosebleeds, postnasal drip, rhinorrhea, sinus pressure, sinus pain, sore throat, tinnitus, trouble swallowing and voice change.    Respiratory:  Negative for cough, choking, wheezing and stridor.    Cardiovascular:  Negative for chest pain.   Gastrointestinal: Negative.    Genitourinary: Negative.    Skin:  Negative for color change and rash.   Neurological:  Negative for speech difficulty, light-headedness, numbness and headaches.   Hematological:  Negative for adenopathy.   Psychiatric/Behavioral:  Negative for behavioral problems.    All other systems reviewed and are negative.              Objective:     Vitals:    01/30/24 0854   BP: 122/84   Pulse: 78   Temp: 97.3 °F (36.3 °C)   SpO2: 93%     Physical Exam  Constitutional:       Appearance: Normal appearance. She is normal weight.   HENT:      Head: Normocephalic and atraumatic.      Right Ear: Tympanic membrane, ear canal and external ear normal. No drainage.      Left Ear: Tympanic membrane, ear canal and external ear normal. No drainage.

## 2024-02-02 ENCOUNTER — OFFICE VISIT (OUTPATIENT)
Dept: FAMILY MEDICINE CLINIC | Age: 79
End: 2024-02-02
Payer: MEDICARE

## 2024-02-02 VITALS
OXYGEN SATURATION: 98 % | SYSTOLIC BLOOD PRESSURE: 136 MMHG | HEIGHT: 68 IN | HEART RATE: 82 BPM | WEIGHT: 232 LBS | DIASTOLIC BLOOD PRESSURE: 88 MMHG | BODY MASS INDEX: 35.16 KG/M2 | RESPIRATION RATE: 18 BRPM | TEMPERATURE: 97.2 F

## 2024-02-02 DIAGNOSIS — M21.611 BUNION, RIGHT FOOT: ICD-10-CM

## 2024-02-02 DIAGNOSIS — M67.912 ROTATOR CUFF DISORDER, LEFT: ICD-10-CM

## 2024-02-02 DIAGNOSIS — F32.5 MAJOR DEPRESSIVE DISORDER IN REMISSION, UNSPECIFIED WHETHER RECURRENT (HCC): Primary | ICD-10-CM

## 2024-02-02 DIAGNOSIS — M79.89 LEG SWELLING: ICD-10-CM

## 2024-02-02 DIAGNOSIS — L03.012 PARONYCHIA OF LEFT MIDDLE FINGER: ICD-10-CM

## 2024-02-02 PROCEDURE — G8417 CALC BMI ABV UP PARAM F/U: HCPCS

## 2024-02-02 PROCEDURE — 1123F ACP DISCUSS/DSCN MKR DOCD: CPT

## 2024-02-02 PROCEDURE — G8427 DOCREV CUR MEDS BY ELIG CLIN: HCPCS

## 2024-02-02 PROCEDURE — G8484 FLU IMMUNIZE NO ADMIN: HCPCS

## 2024-02-02 PROCEDURE — 99213 OFFICE O/P EST LOW 20 MIN: CPT

## 2024-02-02 PROCEDURE — G8400 PT W/DXA NO RESULTS DOC: HCPCS

## 2024-02-02 PROCEDURE — 1090F PRES/ABSN URINE INCON ASSESS: CPT

## 2024-02-02 PROCEDURE — 1036F TOBACCO NON-USER: CPT

## 2024-02-02 ASSESSMENT — PATIENT HEALTH QUESTIONNAIRE - PHQ9
9. THOUGHTS THAT YOU WOULD BE BETTER OFF DEAD, OR OF HURTING YOURSELF: 0
2. FEELING DOWN, DEPRESSED OR HOPELESS: 3
3. TROUBLE FALLING OR STAYING ASLEEP: 0
6. FEELING BAD ABOUT YOURSELF - OR THAT YOU ARE A FAILURE OR HAVE LET YOURSELF OR YOUR FAMILY DOWN: 0
4. FEELING TIRED OR HAVING LITTLE ENERGY: 0
SUM OF ALL RESPONSES TO PHQ QUESTIONS 1-9: 5
SUM OF ALL RESPONSES TO PHQ QUESTIONS 1-9: 5
10. IF YOU CHECKED OFF ANY PROBLEMS, HOW DIFFICULT HAVE THESE PROBLEMS MADE IT FOR YOU TO DO YOUR WORK, TAKE CARE OF THINGS AT HOME, OR GET ALONG WITH OTHER PEOPLE: 2
SUM OF ALL RESPONSES TO PHQ QUESTIONS 1-9: 5
SUM OF ALL RESPONSES TO PHQ9 QUESTIONS 1 & 2: 5
1. LITTLE INTEREST OR PLEASURE IN DOING THINGS: 2
7. TROUBLE CONCENTRATING ON THINGS, SUCH AS READING THE NEWSPAPER OR WATCHING TELEVISION: 0
SUM OF ALL RESPONSES TO PHQ QUESTIONS 1-9: 5
8. MOVING OR SPEAKING SO SLOWLY THAT OTHER PEOPLE COULD HAVE NOTICED. OR THE OPPOSITE, BEING SO FIGETY OR RESTLESS THAT YOU HAVE BEEN MOVING AROUND A LOT MORE THAN USUAL: 0
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
## 2024-02-02 NOTE — PATIENT INSTRUCTIONS
BEHAVIORAL HEALTH REFERRALS    PSYCARE (counseling and medication management)   [place] [phone].  Donell Brown, Ph.D. or  Alka Lang, MS, [facility], [facility] [phone]   Wang Wan, MS.Ed., [facility] [phone]  Caroline Schaffer, MSW, LISW-S; Kimberley Mendoza, MS.Ed., [facility]; Dina Dooley, MSW, [facility]     [facility]SELING (counseling only)   [place] [phone]  Svetlana Hernandez, [facility], [facility]; or Rina Marcano, Ph.D, [facility]     Dr. Cathy Gardner. (Counseling only)     [facility] [place] [address]   Phone: [phone]   Email: [email or web address]     Dr. Alexis Irving (Counseling only)       [phone]     [address]     [email or web address]     Also call your insurance for a more extensive list    Go on youtube > [place]

## 2024-02-02 NOTE — PROGRESS NOTES
S: 78 y.o. female here for depression, anxiety. No SI or HI. Requesting therapist.   Leg swelling. Improved L > R. Hasn't done echo yet.   Bunion on R foot.     O: VS: /88 (Site: Right Upper Arm, Position: Sitting) Comment: TAKEN MANUALLY  Pulse 82   Temp 97.2 °F (36.2 °C)   Resp 18   Ht 1.727 m (5' 7.99\")   Wt 105.2 kg (232 lb)   SpO2 98%   BMI 35.28 kg/m²    General: NAD, alert and interacting appropriately.    CV:  RRR, no gallops, rubs, or murmurs    Resp: CTAB   Abd:  Soft, nontender   Ext:  2+ on R, 1+ on L. Bunion R > L foot.     Impression: depression, anxiety. Leg swelling. bunion  Plan:   Refer to therapy  Echo  Refer to podiatry    Attending Physician Statement  I have discussed the case, including pertinent history and exam findings with the resident.  I agree with the documented assessment and plan.

## 2024-02-05 PROBLEM — F32.5 MAJOR DEPRESSIVE DISORDER IN REMISSION (HCC): Status: ACTIVE | Noted: 2024-02-05

## 2024-02-05 PROBLEM — L03.012 PARONYCHIA OF LEFT MIDDLE FINGER: Status: ACTIVE | Noted: 2024-02-05

## 2024-02-05 PROBLEM — M21.611 BUNION, RIGHT FOOT: Status: ACTIVE | Noted: 2024-02-05

## 2024-02-05 PROBLEM — M67.912 ROTATOR CUFF DISORDER, LEFT: Status: ACTIVE | Noted: 2024-02-05

## 2024-03-08 ENCOUNTER — OFFICE VISIT (OUTPATIENT)
Dept: FAMILY MEDICINE CLINIC | Age: 79
End: 2024-03-08

## 2024-03-08 VITALS
BODY MASS INDEX: 36.92 KG/M2 | TEMPERATURE: 97.8 F | WEIGHT: 243.6 LBS | HEART RATE: 76 BPM | RESPIRATION RATE: 18 BRPM | HEIGHT: 68 IN | DIASTOLIC BLOOD PRESSURE: 61 MMHG | SYSTOLIC BLOOD PRESSURE: 110 MMHG

## 2024-03-08 DIAGNOSIS — M67.912 ROTATOR CUFF DISORDER, LEFT: ICD-10-CM

## 2024-03-08 DIAGNOSIS — F32.5 MAJOR DEPRESSIVE DISORDER IN REMISSION, UNSPECIFIED WHETHER RECURRENT (HCC): ICD-10-CM

## 2024-03-08 DIAGNOSIS — M25.512 CHRONIC PAIN IN LEFT SHOULDER: ICD-10-CM

## 2024-03-08 DIAGNOSIS — Z00.00 MEDICARE ANNUAL WELLNESS VISIT, SUBSEQUENT: Primary | ICD-10-CM

## 2024-03-08 DIAGNOSIS — G89.29 CHRONIC PAIN IN LEFT SHOULDER: ICD-10-CM

## 2024-03-08 ASSESSMENT — PATIENT HEALTH QUESTIONNAIRE - PHQ9
SUM OF ALL RESPONSES TO PHQ QUESTIONS 1-9: 16
SUM OF ALL RESPONSES TO PHQ QUESTIONS 1-9: 16
3. TROUBLE FALLING OR STAYING ASLEEP: 3
5. POOR APPETITE OR OVEREATING: 3
4. FEELING TIRED OR HAVING LITTLE ENERGY: 3
8. MOVING OR SPEAKING SO SLOWLY THAT OTHER PEOPLE COULD HAVE NOTICED. OR THE OPPOSITE, BEING SO FIGETY OR RESTLESS THAT YOU HAVE BEEN MOVING AROUND A LOT MORE THAN USUAL: 0
7. TROUBLE CONCENTRATING ON THINGS, SUCH AS READING THE NEWSPAPER OR WATCHING TELEVISION: 1
9. THOUGHTS THAT YOU WOULD BE BETTER OFF DEAD, OR OF HURTING YOURSELF: 0
SUM OF ALL RESPONSES TO PHQ9 QUESTIONS 1 & 2: 5
SUM OF ALL RESPONSES TO PHQ QUESTIONS 1-9: 16
SUM OF ALL RESPONSES TO PHQ QUESTIONS 1-9: 16
6. FEELING BAD ABOUT YOURSELF - OR THAT YOU ARE A FAILURE OR HAVE LET YOURSELF OR YOUR FAMILY DOWN: 1
2. FEELING DOWN, DEPRESSED OR HOPELESS: 2
10. IF YOU CHECKED OFF ANY PROBLEMS, HOW DIFFICULT HAVE THESE PROBLEMS MADE IT FOR YOU TO DO YOUR WORK, TAKE CARE OF THINGS AT HOME, OR GET ALONG WITH OTHER PEOPLE: 1
1. LITTLE INTEREST OR PLEASURE IN DOING THINGS: 3

## 2024-03-08 ASSESSMENT — LIFESTYLE VARIABLES
HOW OFTEN DO YOU HAVE A DRINK CONTAINING ALCOHOL: MONTHLY OR LESS
HOW MANY STANDARD DRINKS CONTAINING ALCOHOL DO YOU HAVE ON A TYPICAL DAY: 1 OR 2
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
## 2024-03-08 NOTE — PROGRESS NOTES
Medicare Annual Wellness Visit    Kailey Sims is here for Medicare AWV    Assessment & Plan   1. Medicare annual wellness visit, subsequent  2. Major depressive disorder in remission, unspecified whether recurrent (HCC)  Overview:  Hx of recurrent depression, with some anxiety  Laid awake at night thinking about things that happen  Would like to stay on top of it  Would like to speak to therapist, preferrably a woman  Plan is to call her insurance to see who is covered  3. BMI 37.0-37.9, adult  Overview:  Taking supplements and working on diet  Will take the treadmill out and do more walking in the springtime    4. Chronic pain in left shoulder  5. Rotator cuff disorder, left  Overview:  Ddx tear in rotator cuff, order x-ray in future  Patient declines at the moment due to scheduling, continue to monitor  Physical therapy at home given in pt instructions       Recommendations for Preventive Services Due: see orders and patient instructions/AVS.  Recommended screening schedule for the next 5-10 years is provided to the patient in written form: see Patient Instructions/AVS.     Return in about 3 months (around 6/8/2024).     Subjective   The following acute and/or chronic problems were also addressed today:  Seen podiatry for the hammer toe or bunyon and got 2 shots    Patient's complete Health Risk Assessment and screening values have been reviewed and are found in Flowsheets. The following problems were reviewed today and where indicated follow up appointments were made and/or referrals ordered.    Positive Risk Factor Screenings with Interventions:        Depression:  PHQ-2 Score: 5  PHQ-9 Total Score: 16    Interpretation:  5-9 mild   10-14 moderate   15-19 moderately severe   20-27 severe   Interventions:  Life style changes to improve mood reviewed  Will look for counselor          General HRA Questions:  Select all that apply: (!) Stress, Loneliness, Anger    Loneliness Interventions:  Patient comments: she

## 2024-03-08 NOTE — PATIENT INSTRUCTIONS
includes whole grains, vegetables, and fruits, and that is low in saturated fat and sodium.  Encourage the person you're caring for not to use tobacco products. They can affect dental and general health.  Many older adults have a fixed income and feel that they can't afford dental care. But most towns and cities have programs in which dentists help older adults by lowering fees. Contact your area's public health offices or  for information about dental care in your area.  Using a toothbrush  Older adults with arthritis sometimes have trouble brushing their teeth because they can't easily hold the toothbrush. Their hands and fingers may be stiff, painful, or weak. If this is the case, you can:  Offer an electric toothbrush.  Enlarge the handle of a non-electric toothbrush by wrapping a sponge, an elastic bandage, or adhesive tape around it.  Push the toothbrush handle through a ball made of rubber or soft foam.  Make the handle longer and thicker by taping Popsicle sticks or tongue depressors to it.  You may also be able to buy special toothbrushes, toothpaste dispensers, and floss holders.  Your doctor may recommend a soft-bristle toothbrush if the person you care for bleeds easily. Bleeding can happen because of a health problem or from certain medicines.  A toothpaste for sensitive teeth may help if the person you care for has sensitive teeth.  How do you brush and floss someone's teeth?  If the person you are caring for has a hard time cleaning their teeth on their own, you may need to brush and floss their teeth for them. It may be easiest to have the person sit and face away from you, and to sit or stand behind them. That way you can steady their head against your arm as you reach around to floss and brush their teeth. Choose a place that has good lighting and is comfortable for both of you.  Before you begin, gather your supplies. You will need gloves, floss, a toothbrush, and a container to

## 2024-03-08 NOTE — PROGRESS NOTES
Care gaps identified:  Shingrix series - handouts attached to AVS  RSV - handouts attached to AVS  Covid - handouts attached to AVS  DEXA - Had at [facility], will obtain records  AWV - today is AWV  Nusrat Humphrey LPN    COCO faxed to [facility] for DEXA, PAP, and mammo results. See media. Nusrat Humphrey LPN'

## 2024-03-10 DIAGNOSIS — M67.912 ROTATOR CUFF DISORDER, LEFT: Primary | ICD-10-CM

## 2024-03-10 PROBLEM — M25.512 CHRONIC PAIN IN LEFT SHOULDER: Status: ACTIVE | Noted: 2024-03-10

## 2024-03-10 PROBLEM — G89.29 CHRONIC PAIN IN LEFT SHOULDER: Status: ACTIVE | Noted: 2024-03-10

## 2024-05-22 ENCOUNTER — TELEPHONE (OUTPATIENT)
Dept: FAMILY MEDICINE CLINIC | Age: 79
End: 2024-05-22

## 2024-05-22 DIAGNOSIS — M79.602 LEFT ARM PAIN: Primary | ICD-10-CM

## 2024-05-22 NOTE — TELEPHONE ENCOUNTER
Received authorization request from rehab supplies regarding bilateral knee orthosis and lumbar orthosis for patient.  They said to reply in 2 business days? unaware of what she needed it for, I do know she follows with podiatry.  Wanted to call patient to clarify but she did not .  If patient calls back just please ask her if this is what she wanted before I authorize it thank you.

## 2024-05-24 NOTE — TELEPHONE ENCOUNTER
Spoke to patient on the phone she said she did not request any orthosis and does not require any.  Will this regard request received by fax.    She did request to speak to a counselor and I gave her the number for [facility] [phone] to schedule an appointment.  She would also like an x-ray of the upper arm that we had talked about last visit and I will go ahead and order that.  Will follow-up in the office 6/14.    Electronically signed by Sruthi Sun MD on 5/24/2024 at 1:15 PM

## 2024-06-07 ENCOUNTER — TELEPHONE (OUTPATIENT)
Dept: FAMILY MEDICINE CLINIC | Age: 79
End: 2024-06-07

## 2024-06-07 NOTE — TELEPHONE ENCOUNTER
Tried to call patient to see if we could schedule an appointment on 6/20 at 2pm for a behavioral health co-treatment with Dr. Lino  However she did not  the phone, left VM for her to call back and discuss    Electronically signed by Sruthi Sun MD on 6/7/2024 at 3:23 PM

## 2024-06-24 ENCOUNTER — OFFICE VISIT (OUTPATIENT)
Dept: FAMILY MEDICINE CLINIC | Age: 79
End: 2024-06-24
Payer: MEDICARE

## 2024-06-24 VITALS
RESPIRATION RATE: 16 BRPM | HEIGHT: 68 IN | OXYGEN SATURATION: 96 % | HEART RATE: 72 BPM | WEIGHT: 232.6 LBS | DIASTOLIC BLOOD PRESSURE: 73 MMHG | TEMPERATURE: 97.1 F | BODY MASS INDEX: 35.25 KG/M2 | SYSTOLIC BLOOD PRESSURE: 129 MMHG

## 2024-06-24 DIAGNOSIS — M67.912 ROTATOR CUFF DISORDER, LEFT: Primary | ICD-10-CM

## 2024-06-24 DIAGNOSIS — F32.5 MAJOR DEPRESSIVE DISORDER IN REMISSION, UNSPECIFIED WHETHER RECURRENT (HCC): ICD-10-CM

## 2024-06-24 PROCEDURE — G8417 CALC BMI ABV UP PARAM F/U: HCPCS

## 2024-06-24 PROCEDURE — 1123F ACP DISCUSS/DSCN MKR DOCD: CPT

## 2024-06-24 PROCEDURE — G2211 COMPLEX E/M VISIT ADD ON: HCPCS

## 2024-06-24 PROCEDURE — G8427 DOCREV CUR MEDS BY ELIG CLIN: HCPCS

## 2024-06-24 PROCEDURE — G8400 PT W/DXA NO RESULTS DOC: HCPCS

## 2024-06-24 PROCEDURE — 1036F TOBACCO NON-USER: CPT

## 2024-06-24 PROCEDURE — 99213 OFFICE O/P EST LOW 20 MIN: CPT

## 2024-06-24 PROCEDURE — 1090F PRES/ABSN URINE INCON ASSESS: CPT

## 2024-06-24 RX ORDER — ERYTHROMYCIN 5 MG/G
OINTMENT OPHTHALMIC
COMMUNITY
Start: 2024-06-08

## 2024-06-25 ENCOUNTER — TELEPHONE (OUTPATIENT)
Dept: CARDIOLOGY | Age: 79
End: 2024-06-25

## 2024-06-25 NOTE — TELEPHONE ENCOUNTER
Returned patient's call to schedule echo.  Unable to LM    Electronically signed by Amelia Ybarra on 6/25/2024 at 10:28 AM
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
## 2024-07-29 ENCOUNTER — TELEPHONE (OUTPATIENT)
Dept: ENT CLINIC | Age: 79
End: 2024-07-29

## 2024-07-29 NOTE — TELEPHONE ENCOUNTER
Pt cancelled appt via automated system. Called to r/s and lvm. Electronically signed by Caroline Marcos on 7/29/2024 at 12:55 PM

## 2024-08-01 NOTE — TELEPHONE ENCOUNTER
2nd attempt to reschedule pts appt. Lvm. Electronically signed by Caroline Marcos on 8/1/2024 at 11:40 AM

## 2024-08-06 NOTE — TELEPHONE ENCOUNTER
Spoke with pt. She is not having any problems at the moment and does not want to r/s but will call in the future if needed. Electronically signed by Caroline Marcos on 8/6/2024 at 10:52 AM

## 2024-10-04 ENCOUNTER — OFFICE VISIT (OUTPATIENT)
Dept: FAMILY MEDICINE CLINIC | Age: 79
End: 2024-10-04
Payer: MEDICARE

## 2024-10-04 VITALS
SYSTOLIC BLOOD PRESSURE: 100 MMHG | TEMPERATURE: 97.2 F | OXYGEN SATURATION: 95 % | HEART RATE: 75 BPM | BODY MASS INDEX: 34.71 KG/M2 | DIASTOLIC BLOOD PRESSURE: 64 MMHG | HEIGHT: 68 IN | WEIGHT: 229 LBS | RESPIRATION RATE: 16 BRPM

## 2024-10-04 DIAGNOSIS — M67.912 ROTATOR CUFF DISORDER, LEFT: Primary | ICD-10-CM

## 2024-10-04 DIAGNOSIS — E88.810 METABOLIC SYNDROME: ICD-10-CM

## 2024-10-04 DIAGNOSIS — Z28.21 INFLUENZA VACCINATION DECLINED: ICD-10-CM

## 2024-10-04 DIAGNOSIS — K13.21 LEUKOPLAKIA OF ORAL CAVITY: ICD-10-CM

## 2024-10-04 PROBLEM — M79.89 LEG SWELLING: Status: RESOLVED | Noted: 2023-12-19 | Resolved: 2024-10-04

## 2024-10-04 PROBLEM — K75.0 HEPATIC ABSCESS: Status: RESOLVED | Noted: 2017-03-05 | Resolved: 2024-10-04

## 2024-10-04 PROCEDURE — G8484 FLU IMMUNIZE NO ADMIN: HCPCS

## 2024-10-04 PROCEDURE — 1036F TOBACCO NON-USER: CPT

## 2024-10-04 PROCEDURE — 99213 OFFICE O/P EST LOW 20 MIN: CPT

## 2024-10-04 PROCEDURE — 1090F PRES/ABSN URINE INCON ASSESS: CPT

## 2024-10-04 PROCEDURE — G8400 PT W/DXA NO RESULTS DOC: HCPCS

## 2024-10-04 PROCEDURE — G2211 COMPLEX E/M VISIT ADD ON: HCPCS

## 2024-10-04 PROCEDURE — G8417 CALC BMI ABV UP PARAM F/U: HCPCS

## 2024-10-04 PROCEDURE — 1123F ACP DISCUSS/DSCN MKR DOCD: CPT

## 2024-10-04 PROCEDURE — G8427 DOCREV CUR MEDS BY ELIG CLIN: HCPCS

## 2024-10-04 NOTE — PROGRESS NOTES
S: 79 y.o. female here for R shoulder pain. Controlled.   Leukoplakia tongue. Following with dental and OMFS.       O: VS: /64   Pulse 75   Temp 97.2 °F (36.2 °C) (Temporal)   Resp 16   Ht 1.727 m (5' 8\")   Wt 103.9 kg (229 lb)   SpO2 95%   BMI 34.82 kg/m²    General: NAD, alert and interacting appropriately.    ENT: leukoplakia.       Impression: R shoulder pain. Leukoplakia.   Plan:   HEP  F/u dental and OMFS  Labs  Rtc 3 mo  Declined flu shot    Attending Physician Statement  I have discussed the case, including pertinent history and exam findings with the resident.  I agree with the documented assessment and plan.
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
## 2024-11-13 DIAGNOSIS — K13.21 LEUKOPLAKIA OF ORAL CAVITY: ICD-10-CM

## 2024-11-13 DIAGNOSIS — E88.810 METABOLIC SYNDROME: ICD-10-CM

## 2024-11-13 LAB
ANION GAP SERPL CALCULATED.3IONS-SCNC: 10 MMOL/L (ref 7–16)
BASOPHILS ABSOLUTE: 0.04 K/UL (ref 0–0.2)
BASOPHILS RELATIVE PERCENT: 1 % (ref 0–2)
BUN BLDV-MCNC: 13 MG/DL (ref 6–23)
CALCIUM SERPL-MCNC: 9.6 MG/DL (ref 8.6–10.2)
CHLORIDE BLD-SCNC: 106 MMOL/L (ref 98–107)
CHOLESTEROL, TOTAL: 219 MG/DL
CO2: 25 MMOL/L (ref 22–29)
CREAT SERPL-MCNC: 1.1 MG/DL (ref 0.5–1)
EOSINOPHILS ABSOLUTE: 0.05 K/UL (ref 0.05–0.5)
EOSINOPHILS RELATIVE PERCENT: 1 % (ref 0–6)
GFR, ESTIMATED: 50 ML/MIN/1.73M2
GLUCOSE BLD-MCNC: 100 MG/DL (ref 74–99)
HCT VFR BLD CALC: 48.4 % (ref 34–48)
HDLC SERPL-MCNC: 49 MG/DL
HEMOGLOBIN: 14.9 G/DL (ref 11.5–15.5)
IMMATURE GRANULOCYTES %: 0 % (ref 0–5)
IMMATURE GRANULOCYTES ABSOLUTE: <0.03 K/UL (ref 0–0.58)
LDL CHOLESTEROL: 153 MG/DL
LYMPHOCYTES ABSOLUTE: 1.39 K/UL (ref 1.5–4)
LYMPHOCYTES RELATIVE PERCENT: 30 % (ref 20–42)
MCH RBC QN AUTO: 27.4 PG (ref 26–35)
MCHC RBC AUTO-ENTMCNC: 30.8 G/DL (ref 32–34.5)
MCV RBC AUTO: 89 FL (ref 80–99.9)
MONOCYTES ABSOLUTE: 0.6 K/UL (ref 0.1–0.95)
MONOCYTES RELATIVE PERCENT: 13 % (ref 2–12)
NEUTROPHILS ABSOLUTE: 2.52 K/UL (ref 1.8–7.3)
NEUTROPHILS RELATIVE PERCENT: 55 % (ref 43–80)
PDW BLD-RTO: 12.4 % (ref 11.5–15)
PLATELET # BLD: 208 K/UL (ref 130–450)
PMV BLD AUTO: 9.4 FL (ref 7–12)
POTASSIUM SERPL-SCNC: 4.5 MMOL/L (ref 3.5–5)
RBC # BLD: 5.44 M/UL (ref 3.5–5.5)
SODIUM BLD-SCNC: 141 MMOL/L (ref 132–146)
TRIGL SERPL-MCNC: 87 MG/DL
VLDLC SERPL CALC-MCNC: 17 MG/DL
WBC # BLD: 4.6 K/UL (ref 4.5–11.5)

## 2024-11-14 LAB — HIV AG/AB: NONREACTIVE

## 2024-12-12 ENCOUNTER — TELEPHONE (OUTPATIENT)
Dept: FAMILY MEDICINE CLINIC | Age: 79
End: 2024-12-12

## 2024-12-12 NOTE — TELEPHONE ENCOUNTER
----- Message from [employer] sent at 12/12/2024  8:29 AM EST -----  Regarding: ECC Results Request  ECC Results Request    Which lab or imaging result is the patient calling about: blood work    Which provider ordered the test?  Sruthi Sun Chi, MD        Was this a [facility]: No    Date the test was preformed (MM/DEBI/YYYY):  --------------------------------------------------------------------------------------------------------------------------    Relationship to Patient: Self     Call Back Info: OK to leave message on voicemail  Preferred Call Back Number: Phone [phone] (mobile)

## 2024-12-12 NOTE — TELEPHONE ENCOUNTER
Spoke to patient    CKD stable    Elevated alk phos - start vit D and then recheck labs next visit    Missed appt, made new one @ 1/16 11am    Electronically signed by Sruthi Sun MD on 12/12/2024 at 1:02 PM

## 2025-03-13 ENCOUNTER — TELEPHONE (OUTPATIENT)
Dept: BREAST CENTER | Age: 80
End: 2025-03-13

## 2025-03-13 NOTE — TELEPHONE ENCOUNTER
RN received a referral from MATIAS Crespo. 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 This isnt something that has surgery and will be followed by oncology. RN contacted referring office and notified them that referral should go to oncology not us. RN spoke to Aide who verbalized understanding. RN provided Aide with  and Dr. Cornell Almazan name for oncologist who are within [facility].        Electronically signed by Collette Mclaughlin RN on 3/13/25 at 1:57 PM EDT

## 2025-03-14 ENCOUNTER — TELEPHONE (OUTPATIENT)
Dept: FAMILY MEDICINE CLINIC | Age: 80
End: 2025-03-14

## 2025-03-14 DIAGNOSIS — C85.99 LYMPHOMA OF BREAST (HCC): Primary | ICD-10-CM

## 2025-03-14 NOTE — TELEPHONE ENCOUNTER
Spoke to patient on the phone, she states that she is doing okay right now.  Trying to stay off the Internet.  Looking through supplements that she needs/does not need, advised to bring everything that she is taking to the office next visit on 3/24/2025.  Will place referral to [facility] in [place].

## 2025-03-14 NOTE — TELEPHONE ENCOUNTER
Pt called to request a call back to discuss the last blood test result.  Pt states she was just told she has cancer.

## 2025-03-20 ENCOUNTER — TELEPHONE (OUTPATIENT)
Dept: FAMILY MEDICINE CLINIC | Age: 80
End: 2025-03-20

## 2025-03-20 NOTE — TELEPHONE ENCOUNTER
Needs new note that addresses the cancer in order for oncology can get  scheduled per Cheyanne at Dr Rafa Preston. Kailey has an appt with you 03/24/2025 at 2 pm. Then needs faxed to Cheyanne at Dr Rafa Preston office [phone]. Thanks!

## 2025-03-24 ENCOUNTER — OFFICE VISIT (OUTPATIENT)
Dept: FAMILY MEDICINE CLINIC | Age: 80
End: 2025-03-24
Payer: MEDICARE

## 2025-03-24 VITALS
RESPIRATION RATE: 17 BRPM | DIASTOLIC BLOOD PRESSURE: 77 MMHG | BODY MASS INDEX: 34.71 KG/M2 | HEIGHT: 68 IN | WEIGHT: 229 LBS | HEART RATE: 75 BPM | SYSTOLIC BLOOD PRESSURE: 112 MMHG | OXYGEN SATURATION: 94 % | TEMPERATURE: 97.3 F

## 2025-03-24 DIAGNOSIS — C83.09 SMALL B-CELL LYMPHOMA OF SOLID ORGAN EXCLUDING SPLEEN (HCC): Primary | ICD-10-CM

## 2025-03-24 PROBLEM — C83.04: Status: ACTIVE | Noted: 2025-03-24

## 2025-03-24 PROCEDURE — 1090F PRES/ABSN URINE INCON ASSESS: CPT

## 2025-03-24 PROCEDURE — G8428 CUR MEDS NOT DOCUMENT: HCPCS

## 2025-03-24 PROCEDURE — G8417 CALC BMI ABV UP PARAM F/U: HCPCS

## 2025-03-24 PROCEDURE — G8400 PT W/DXA NO RESULTS DOC: HCPCS

## 2025-03-24 PROCEDURE — 99213 OFFICE O/P EST LOW 20 MIN: CPT

## 2025-03-24 PROCEDURE — 1123F ACP DISCUSS/DSCN MKR DOCD: CPT

## 2025-03-24 PROCEDURE — G2211 COMPLEX E/M VISIT ADD ON: HCPCS

## 2025-03-24 PROCEDURE — 1036F TOBACCO NON-USER: CPT

## 2025-03-24 SDOH — ECONOMIC STABILITY: FOOD INSECURITY: WITHIN THE PAST 12 MONTHS, YOU WORRIED THAT YOUR FOOD WOULD RUN OUT BEFORE YOU GOT MONEY TO BUY MORE.: NEVER TRUE

## 2025-03-24 SDOH — ECONOMIC STABILITY: FOOD INSECURITY: WITHIN THE PAST 12 MONTHS, THE FOOD YOU BOUGHT JUST DIDN'T LAST AND YOU DIDN'T HAVE MONEY TO GET MORE.: NEVER TRUE

## 2025-03-24 NOTE — TELEPHONE ENCOUNTER
Please attach progress note from 3/24/2025  Please send referral as urgent thanks    Electronically signed by Sruthi Sun MD on 3/24/2025 at 5:22 PM

## 2025-03-24 NOTE — PROGRESS NOTES
[facility]  FAMILY MEDICINE RESIDENCY PROGRAM  DATE OF VISIT : 2025    Patient : Kailey Sims   Age : 79 y.o.    : 1945   MRN : [id number]   ______________________________________________________________________    Assessment & Plan :    1. 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 per pt request  Please resend referral urgently for pt to be seen in 1-2 weeks  Defer PET CT to oncology  Continue f/u with gyn  See pt back in 3-4 weeks for close monitoring      Additional plan and future considerations:     R hand 3rd digit pus under nail    Return to Office: 3 weeks    Sruthi Sun MD  Case discussed with Dr. Staley    ______________________________________________________________________    Chief Complaint :   Chief Complaint   Patient presents with    Lymphoma     Lymphoma in the right breast    Referral - General       HPI : Kailey Sims is 79 y.o. female who presented to the clinic today for above.    Bilateral screening mammogram 2025 done at [facility] showed there was breast density category B and needed additional imaging.    There are several reports of previous screening mammogram back to  in media.    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 RN reached out to breast clinic and PCP office 3/13 and requested referral to oncology.    Needs new note for referral    Tired when she walks too much, this was present prior to diagnosis  Denies sweating at night  Denies dyspnea at rest    Dad with colon cancer - several man who worked with him apparently got it too  Patient herself got colonoscopy  with Dr. Lamb  Aged out pap smear      Last Visit  : 10/4/2024    Health Maintenance :  Health Maintenance Due   Topic Date Due    Shingles vaccine (1 of 2) Never done    Respiratory Syncytial Virus (RSV) Pregnant or age 60 yrs+ (1 - 1-dose 75+ series) Never done

## 2025-03-24 NOTE — PROGRESS NOTES
S: 79 y.o. female presents today for Lymphoma (Lymphoma in the right breast) and Referral - General      Lymphoma R breast: routine mammo 1/2025 at [facility] gyn; mass noted; core biopsy showed small B cell lymphoma; needs referral to med onc;     O: VS: /77 (BP Site: Right Upper Arm, Patient Position: Sitting, BP Cuff Size: Large Adult)   Pulse 75   Temp 97.3 °F (36.3 °C) (Temporal)   Resp 17   Ht 1.727 m (5' 8\")   Wt 103.9 kg (229 lb)   SpO2 94%   BMI 34.82 kg/m²   AAO/NAD, appropriate affect for mood  CV:  RRR, no murmur  Resp: CTAB  Abdomen ;SNTND  Ext: no edema  No lymph swelling    Assessment/Plan:   1) B cell lymphoma in breast - referral to med onc at this time  2) HM as ordered  RTO: 2-4 weeks      Attending Physician Statement  I have discussed the case, including pertinent history and exam findings with the resident.  I agree with the documented assessment and plan.      Electronically signed by Emilie Staley MD on 3/25/2025 at 7:47 AM

## 2025-03-25 ENCOUNTER — TELEPHONE (OUTPATIENT)
Dept: FAMILY MEDICINE CLINIC | Age: 80
End: 2025-03-25

## 2025-04-21 ENCOUNTER — HOSPITAL ENCOUNTER (OUTPATIENT)
Dept: RADIATION ONCOLOGY | Age: 80
Discharge: HOME | End: 2025-04-21
Payer: MEDICARE

## 2025-04-21 VITALS
WEIGHT: 231.1 LBS | HEART RATE: 84 BPM | BODY MASS INDEX: 35.14 KG/M2 | OXYGEN SATURATION: 97 % | TEMPERATURE: 97.6 F | DIASTOLIC BLOOD PRESSURE: 64 MMHG | RESPIRATION RATE: 18 BRPM | SYSTOLIC BLOOD PRESSURE: 116 MMHG

## 2025-04-21 DIAGNOSIS — C85.90 LYMPHOMA, UNSPECIFIED BODY REGION, UNSPECIFIED LYMPHOMA TYPE (HCC): Primary | ICD-10-CM

## 2025-04-21 PROCEDURE — 99205 OFFICE O/P NEW HI 60 MIN: CPT

## 2025-04-21 PROCEDURE — 99205 OFFICE O/P NEW HI 60 MIN: CPT | Performed by: RADIOLOGY

## 2025-04-21 NOTE — PROGRESS NOTES
Kailey Sims  1945 79 y.o.      Referring Physician: Julio     PCP: Sruthi Sun Chi, MD     Vitals:    25 0931   BP: 116/64   Pulse: 84   Resp: 18   Temp: 97.6 °F (36.4 °C)   SpO2: 97%        Wt Readings from Last 3 Encounters:   25 104.8 kg (231 lb 1.6 oz)   25 103.9 kg (229 lb)   10/04/24 103.9 kg (229 lb)        Body mass index is 35.14 kg/m².               Chief Complaint: No chief complaint on file.         Cancer Staging   No matching staging information was found for the patient.      Prior Radiation Therapy? NO    Concurrent Chemo/radiation? NO    Prior Chemotherapy? NO    Prior Hormonal Therapy? NO    Head and Neck Cancer? No, patient does NOT have HN cancer.      LMP: 50    Age at first Menses: 12    : 4    Para: 4        Current Outpatient Medications   Medication Sig Dispense Refill    fluticasone (FLONASE) 50 MCG/ACT nasal spray 2 sprays by Each Nostril route daily 48 g 1    vitamin B-12 (CYANOCOBALAMIN) 1000 MCG tablet Take 1 tablet by mouth daily      loratadine (CLARITIN) 10 MG tablet Take 1 tablet by mouth daily 30 tablet 3    polyethylene glycol (GLYCOLAX) powder Take 17 g by mouth daily as needed       No current facility-administered medications for this encounter.       Past Medical History:   Diagnosis Date    Diarrhea of presumed infectious origin 2017    Diverticulosis of large intestine with hemorrhage 2016    Leg swelling 2023    First noticed on PE on 2023  Patient reported having spells of leg edema at home, usually after sitting for a long time. Denies shortness of breath  Last ECHO in  with normal EF  Ddx include new onset HF, lymphedema, renal related issues, more likely venous insufficiency  EKG in the office was NSR without any acute changes  Encourage compression stockings  CMP, BNP, TSH, UA neg  ECHO pen    Ulcer        Past Surgical History:   Procedure Laterality Date    ANKLE FRACTURE SURGERY Left     Fall    
equal, round, and reactive to light.   Cardiovascular:      Rate and Rhythm: Normal rate and regular rhythm.      Pulses: Normal pulses.   Pulmonary:      Effort: Pulmonary effort is normal.   Abdominal:      General: Abdomen is flat.   Musculoskeletal:         General: Normal range of motion.      Cervical back: Normal range of motion.   Skin:     General: Skin is warm and dry.   Neurological:      General: No focal deficit present.      Mental Status: She is alert and oriented to person, place, and time.   Psychiatric:         Mood and Affect: Mood normal.         Behavior: Behavior normal.         Thought Content: Thought content normal.         Judgment: Judgment normal.             Imaging reviewed:        PET 4/16/25: localized process; SG I        Radiation Safety and Treatment Support:  -previous Radiation history: No  -history of connective tissue disease: No  -history of autoimmune disease: No  -pregnant: not applicable  -fertility conservation and /or contraception discussed: not applicable  -nutrition consult prior to TX: Yes  -PEG: No  -Dental evaluation prior to treatment:No  -Social Work requested: Yes  -Oncology Nurse Navigator requested: Yes  -pre + post treatment PT / Rehab / PM+R evaluation considered: Yes  -ICD: No   -ICD brand: -  -[facility] patient navigator: Ursula Navarrete[[phone]]  -Nurse Practitioners for Radiation Oncology:    ---Castillo Iraheta, MSN, RN, FNP-C   ---CLARITA Oconnell, RN, FNP-BC        Assessment and Plan: Kailey is a pleasant and cooperative 79 year old with a recent diagnosis of AJCC stage group I EN FL / pt declines OBS.   Given the pt wishes for definative treatment and a multidisciplinary review we recommend ISRT as per ILROG and NCCN both of which were reviewed with the pt today.  The risks, benefits, alternatives, process and logistics of external beam radiation were reviewed today.  We answered all of the patient's questions to the best of our ability.  Kailey verbalized

## 2025-04-23 ENCOUNTER — TELEPHONE (OUTPATIENT)
Dept: ONCOLOGY | Age: 80
End: 2025-04-23

## 2025-04-23 ENCOUNTER — OFFICE VISIT (OUTPATIENT)
Dept: FAMILY MEDICINE CLINIC | Age: 80
End: 2025-04-23

## 2025-04-23 VITALS
RESPIRATION RATE: 16 BRPM | DIASTOLIC BLOOD PRESSURE: 67 MMHG | HEIGHT: 68 IN | BODY MASS INDEX: 34.71 KG/M2 | HEART RATE: 77 BPM | SYSTOLIC BLOOD PRESSURE: 98 MMHG | TEMPERATURE: 97.8 F | WEIGHT: 229 LBS

## 2025-04-23 DIAGNOSIS — L03.012 PARONYCHIA OF LEFT MIDDLE FINGER: ICD-10-CM

## 2025-04-23 DIAGNOSIS — C82.00 FOLLICULAR LYMPHOMA GRADE I, UNSPECIFIED BODY REGION (HCC): Primary | ICD-10-CM

## 2025-04-23 ASSESSMENT — PATIENT HEALTH QUESTIONNAIRE - PHQ9
2. FEELING DOWN, DEPRESSED OR HOPELESS: NOT AT ALL
SUM OF ALL RESPONSES TO PHQ QUESTIONS 1-9: 4
5. POOR APPETITE OR OVEREATING: NEARLY EVERY DAY
SUM OF ALL RESPONSES TO PHQ QUESTIONS 1-9: 4
3. TROUBLE FALLING OR STAYING ASLEEP: NOT AT ALL
1. LITTLE INTEREST OR PLEASURE IN DOING THINGS: NOT AT ALL
4. FEELING TIRED OR HAVING LITTLE ENERGY: NOT AT ALL
8. MOVING OR SPEAKING SO SLOWLY THAT OTHER PEOPLE COULD HAVE NOTICED. OR THE OPPOSITE, BEING SO FIGETY OR RESTLESS THAT YOU HAVE BEEN MOVING AROUND A LOT MORE THAN USUAL: NOT AT ALL
SUM OF ALL RESPONSES TO PHQ QUESTIONS 1-9: 4
10. IF YOU CHECKED OFF ANY PROBLEMS, HOW DIFFICULT HAVE THESE PROBLEMS MADE IT FOR YOU TO DO YOUR WORK, TAKE CARE OF THINGS AT HOME, OR GET ALONG WITH OTHER PEOPLE: SOMEWHAT DIFFICULT
7. TROUBLE CONCENTRATING ON THINGS, SUCH AS READING THE NEWSPAPER OR WATCHING TELEVISION: SEVERAL DAYS
9. THOUGHTS THAT YOU WOULD BE BETTER OFF DEAD, OR OF HURTING YOURSELF: NOT AT ALL
SUM OF ALL RESPONSES TO PHQ QUESTIONS 1-9: 4
6. FEELING BAD ABOUT YOURSELF - OR THAT YOU ARE A FAILURE OR HAVE LET YOURSELF OR YOUR FAMILY DOWN: NOT AT ALL

## 2025-04-23 NOTE — TELEPHONE ENCOUNTER
Contacted pt re: positive distress screen.  Pt is 79-year-old female recently evaluated by radiation oncology for breast cancer as referred by [facility].  Introduced self and role of oncology social work.  Reviewed distress screen dated 2025:     2025   Distress Tool Screening    Please select the number that describes how much distress you have experienced in the PAST WEEK: 8    Please select the number that describes how much distress you have experienced TODAY: 8    Work, concerns about job: 0 (None)    Finances, bills, insurance: 0 (None)    Housin (None)    Transportation: 0 (None)    Availability of caregivers: 0 (None)    Sadness/Depression: 0 (None)    Anxiety/Worry/Fear: 0 (None)    Concerns about appearance: 0 (None)    Connection to a higher power: 0 (None)    Sense of meaning and purpose: 0 (None)    Support from my spiritual community: 0 (None)    Nausea: 0 (None)    Eating/Loss of appetite: 4    Pain: 0 (None)    Fatigue: 0 (None)      Practical:  No immediate barriers to care identified at this time.  Pt identified no concerns re: access to care and reported having a strong support system.    Emotional:  Pt expressed no significant concerns re: depression/anxiety.  She noted having very positive experience at consultation.    Spiritual:  No immediate needs identified at this time.    Physical:  Pt reported issues surrounding loss of appetite; will refer to RD.    No additional needs identified at this time.  Reviewed role of oncology SW and encouraged pt to notify this provider if additional needs arise.    BLANCA Gonzalez, LISW-S  Oncology Social Worker   [phone]

## 2025-04-23 NOTE — PATIENT INSTRUCTIONS
Please call every pharmacy around and ask if they provide the shingles vaccine and how much it costs. If it's affordable please get it and let me know when you've received it.      Please call every pharmacy around and ask if they provide the RSV vaccine. If it's affordable please get it and let me know when you've received it.

## 2025-04-23 NOTE — PROGRESS NOTES
S: 79 y.o. female here for f/u B cell lymphoma. Established with On[facility]. PET neg for mets. Planning RTx with Dr. Gusman.     O: VS: BP 98/67   Pulse 77   Temp 97.8 °F (36.6 °C) (Temporal)   Resp 16   Ht 1.727 m (5' 8\")   Wt 103.9 kg (229 lb)   BMI 34.82 kg/m²    General: NAD, alert and interacting appropriately.    CV:  RRR, no gallops, rubs, or murmurs    Resp: CTAB   Abd:  Soft, nontender   Ext:  No edema    Impression: B cell lymphoma  Plan:   Start RTx w/ Kirsten      Attending Physician Statement  I have discussed the case, including pertinent history and exam findings with the resident.  I agree with the documented assessment and plan.

## 2025-04-24 PROBLEM — C82.00 FOLLICULAR LYMPHOMA GRADE I (HCC): Status: ACTIVE | Noted: 2025-04-24

## 2025-04-24 NOTE — PROGRESS NOTES
[facility]  FAMILY MEDICINE RESIDENCY PROGRAM  DATE OF VISIT : 2025    Patient : Kailey Sims   Age : 79 y.o.    : 1945   MRN : [id number]   ______________________________________________________________________    Assessment & Plan :    1. Follicular lymphoma grade I, unspecified body region (HCC)  Overview:  [facility]  PET CT neg for mets, proceed with radiation  2. Paronychia of left middle finger  Overview:  Continue conservative measures  Consider seeing hand surgeon if infection persists     Dad with colon cancer - several man who worked with him apparently got it too  Patient herself got colonoscopy  with Dr. Lamb  Aged out pap smear  Call office or send MyChart if there is any questions, new symptoms or need for aid in ADLs    Return to Office: 3 months    Sruthi Sun MD  Case discussed with Dr. Olvera    ______________________________________________________________________    Chief Complaint :   Chief Complaint   Patient presents with    Cancer       HPI : Kailey Sims is 79 y.o. female who presented to the clinic today for above.    Bilateral screening mammogram 2025 done at [facility] showed there was breast density category B and needed additional imaging.    There are several reports of previous screening mammogram back to  in media.    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 RN reached out to breast clinic and PCP office 3/13 and requested referral to oncology.    Seen Dr. Kim and had PET CT done  No signs of mets  Seen Dr. Gusman for rad onc and commencing radiation in next week    Denies sweating at night  Denies dyspnea at rest    Using vaseline for finger (R hand 3 digit)  Has some clear liquid but denies pus  Denies pain    Has cruise planned and paid for for 2025    Last Visit  : 10/4/2024    Health Maintenance :  Health

## 2025-04-25 ENCOUNTER — HOSPITAL ENCOUNTER (OUTPATIENT)
Dept: RADIATION ONCOLOGY | Age: 80
Discharge: HOME | End: 2025-04-25
Payer: MEDICARE

## 2025-04-25 PROCEDURE — 77290 THER RAD SIMULAJ FIELD CPLX: CPT | Performed by: RADIOLOGY

## 2025-04-25 PROCEDURE — 77332 RADIATION TREATMENT AID(S): CPT | Performed by: RADIOLOGY

## 2025-05-06 ENCOUNTER — HOSPITAL ENCOUNTER (OUTPATIENT)
Dept: RADIATION ONCOLOGY | Age: 80
Discharge: HOME | End: 2025-05-06
Payer: MEDICARE

## 2025-05-06 PROCEDURE — 77301 RADIOTHERAPY DOSE PLAN IMRT: CPT | Performed by: RADIOLOGY

## 2025-05-06 PROCEDURE — 77338 DESIGN MLC DEVICE FOR IMRT: CPT | Performed by: RADIOLOGY

## 2025-05-06 PROCEDURE — 77300 RADIATION THERAPY DOSE PLAN: CPT | Performed by: RADIOLOGY

## 2025-05-07 ENCOUNTER — TELEPHONE (OUTPATIENT)
Dept: RADIATION ONCOLOGY | Age: 80
End: 2025-05-07

## 2025-05-07 NOTE — TELEPHONE ENCOUNTER
Attempted to contact pt per referral for reported decreased appetite on distress screening. Left message w/ contact information. Await return call.  Electronically signed by Talisha Burkett MS, RD, LD on 5/7/2025 at 3:23 PM

## 2025-05-13 ENCOUNTER — HOSPITAL ENCOUNTER (OUTPATIENT)
Dept: RADIATION ONCOLOGY | Age: 80
Discharge: HOME | End: 2025-05-13
Payer: MEDICARE

## 2025-05-13 PROCEDURE — 77385 HC NTSTY MODUL RAD TX DLVR SMPL: CPT | Performed by: RADIOLOGY

## 2025-05-13 PROCEDURE — 77014 HC CT TREATMENT PLAN: CPT | Performed by: RADIOLOGY

## 2025-05-14 ENCOUNTER — HOSPITAL ENCOUNTER (OUTPATIENT)
Dept: RADIATION ONCOLOGY | Age: 80
Discharge: HOME | End: 2025-05-14
Payer: MEDICARE

## 2025-05-14 VITALS
RESPIRATION RATE: 18 BRPM | WEIGHT: 228.1 LBS | TEMPERATURE: 97.2 F | DIASTOLIC BLOOD PRESSURE: 75 MMHG | SYSTOLIC BLOOD PRESSURE: 114 MMHG | OXYGEN SATURATION: 97 % | BODY MASS INDEX: 34.68 KG/M2 | HEART RATE: 91 BPM

## 2025-05-14 DIAGNOSIS — C85.90 LYMPHOMA, UNSPECIFIED BODY REGION, UNSPECIFIED LYMPHOMA TYPE (HCC): Primary | ICD-10-CM

## 2025-05-14 PROCEDURE — 77014 HC CT TREATMENT PLAN: CPT | Performed by: RADIOLOGY

## 2025-05-14 PROCEDURE — 77385 HC NTSTY MODUL RAD TX DLVR SMPL: CPT | Performed by: RADIOLOGY

## 2025-05-14 NOTE — PROGRESS NOTES
DEPARTMENT OF RADIATION ONCOLOGY ON TREATMENT VISIT         5/14/2025      NAME:  Kailey Sims    YOB: 1945    Diagnosis: R breast - lymphoma    SUBJECTIVE:   Kailey Sims has now received fractionated external beam radiation therapy - ongoing.    Past medical, surgical, social and family histories reviewed and updated as indicated.    Pain: controlled    ALLERGIES:  Patient has no known allergies.         Current Outpatient Medications   Medication Sig Dispense Refill    fluticasone (FLONASE) 50 MCG/ACT nasal spray 2 sprays by Each Nostril route daily 48 g 1    vitamin B-12 (CYANOCOBALAMIN) 1000 MCG tablet Take 1 tablet by mouth daily      loratadine (CLARITIN) 10 MG tablet Take 1 tablet by mouth daily 30 tablet 3    polyethylene glycol (GLYCOLAX) powder Take 17 g by mouth daily as needed       No current facility-administered medications for this encounter.           OBJECTIVE:  Alert and fully ambulatory. Pleasant and conversant.        Physical Examination: General appearance - alert, well appearing, and in no distress.          Wt Readings from Last 3 Encounters:   05/14/25 103.5 kg (228 lb 1.6 oz)   04/23/25 103.9 kg (229 lb)   04/21/25 104.8 kg (231 lb 1.6 oz)         ASSESSMENT/PLAN:     Patient is tolerating treatments well with expected toxicities. RBA were reviewed prior to first fraction and PRN.    Current and planned dose reviewed. Goals of treatment and potential side effects were reviewed with the patient PRN. Treatment imaging has been personally reviewed for accuracy and precision.    Questions answered to apparent satisfaction.    Treatments will continue as planned.      MD MS JIMI Toussaint IIIR  Radiation Oncologist        [facility] ([facility]): [phone] /// FAX: [phone]  [facility]): [phone] /// FAX: [phone]  [facility]): [phone] /// FAX: [phone]

## 2025-05-14 NOTE — PROGRESS NOTES
Kailey Sims  5/14/2025  Wt Readings from Last 3 Encounters:   05/14/25 103.5 kg (228 lb 1.6 oz)   04/23/25 103.9 kg (229 lb)   04/21/25 104.8 kg (231 lb 1.6 oz)     Body mass index is 34.68 kg/m².        Treatment Area:CTV right breast    Patient was seen today for weekly visit.     Comfort Alteration  KPS:90%  Fatigue: None    Nutritional Alteration  Anorexia: No   Nausea: No   Vomiting: No     Skin Alteration   Sensation:good and lotion encouraged    Radiation Dermatitis:  na    Mucous Membrane Alteration  Drainage: No  Lymphedema: No    Emotional  Coping: effective    Sexuality Alteration  na    Injury, potential bleeding or infection: na        Lab Results   Component Value Date    WBC 4.6 11/13/2024    HGB 14.9 11/13/2024    HCT 48.4 (H) 11/13/2024     11/13/2024         /75   Pulse 91   Temp 97.2 °F (36.2 °C) (Skin)   Resp 18   Wt 103.5 kg (228 lb 1.6 oz)   SpO2 97%   BMI 34.68 kg/m²   BP within normal range? yes           Assessment/Plan:2/15fx  400/3000cGy and tolerating well.    Gertrude Williamson RN

## 2025-05-14 NOTE — PROGRESS NOTES
Kailey Sims  5/14/2025  Ht Readings from Last 1 Encounters:   04/23/25 1.727 m (5' 8\")     Wt Readings from Last 10 Encounters:   05/14/25 103.5 kg (228 lb 1.6 oz)   04/23/25 103.9 kg (229 lb)   04/21/25 104.8 kg (231 lb 1.6 oz)   03/24/25 103.9 kg (229 lb)   10/04/24 103.9 kg (229 lb)   06/24/24 105.5 kg (232 lb 9.6 oz)   03/08/24 110.5 kg (243 lb 9.6 oz)   02/02/24 105.2 kg (232 lb)   01/30/24 99.8 kg (220 lb)   12/27/23 104.3 kg (230 lb)     Body mass index is 34.68 kg/m².    Assessment: Met w/ pt for introduction during weekly visit. Pt referred to this clinician for poor appetite reported on distress screen. Pt has lost 3# since initial consult 3 weeks ago (1.3%). EMR reflects minimal wt fluctuations over last few years. Pt follows w/ [facility] for medical oncology needs. Pt reports early satiety, consuming significantly smaller meals that normal. Reviewed overall needs, recommending smaller more frequent meals w/ protein source at each. Pt is drinking Ensure shakes once daily. She is unsure of the variety of Ensure. Encouraged continued use of ONS, noting that if wt continues to decrease she may benefit from more frequent use. She denies any further needs at this time, appreciative of time spent. Will follow PRN.    Weight change: -1.3% x3 weeks  Appetite: fair  Nutritional Side Effects: early satiety  Calculated Needs: 20-22 kcal/kg CBW =  kcal, 1.3-1.5 gm/kg IBW = 85-95 gm pro, 1 ml/kcal =  ml fluids  Malnutrition Status: At risk  Nutrition Diagnosis: Inadequate Oral Intake related to Catabolic Illness as evidenced by Diet history of poor intake  and Weight loss     Recommendations: Pt to consume 5-6 small meals/day + high calorie/protein ONS daily    Talisha Burkett, MS, RD, LD

## 2025-05-14 NOTE — PATIENT INSTRUCTIONS
Continue daily fractionated radiation therapy as scheduled. Please see weekly OTV note and intial consultation letter in EPIC for clinical details.        Cody Gusman III, MD MS DABR    [facility]:  [phone]   FAX: [phone]  [facility]:  [phone]   FAX:    [phone]  [facility]:  [phone]   FAX:  [phone]  Email: [email or web address]

## 2025-05-15 ENCOUNTER — HOSPITAL ENCOUNTER (OUTPATIENT)
Dept: RADIATION ONCOLOGY | Age: 80
Discharge: HOME | End: 2025-05-15
Payer: MEDICARE

## 2025-05-15 PROCEDURE — 77385 HC NTSTY MODUL RAD TX DLVR SMPL: CPT | Performed by: RADIOLOGY

## 2025-05-15 PROCEDURE — 77014 HC CT TREATMENT PLAN: CPT | Performed by: RADIOLOGY

## 2025-05-16 ENCOUNTER — HOSPITAL ENCOUNTER (OUTPATIENT)
Dept: RADIATION ONCOLOGY | Age: 80
Discharge: HOME | End: 2025-05-16
Payer: MEDICARE

## 2025-05-16 PROCEDURE — 77385 HC NTSTY MODUL RAD TX DLVR SMPL: CPT | Performed by: RADIOLOGY

## 2025-05-16 PROCEDURE — 77014 HC CT TREATMENT PLAN: CPT | Performed by: RADIOLOGY

## 2025-05-19 ENCOUNTER — HOSPITAL ENCOUNTER (OUTPATIENT)
Dept: RADIATION ONCOLOGY | Age: 80
Discharge: HOME | End: 2025-05-19
Payer: MEDICARE

## 2025-05-19 PROCEDURE — 77014 CHG CT GUIDANCE RADIATION THERAPY FLDS PLACEMENT: CPT | Performed by: RADIOLOGY

## 2025-05-19 PROCEDURE — 77427 RADIATION TX MANAGEMENT X5: CPT | Performed by: RADIOLOGY

## 2025-05-19 PROCEDURE — 77336 RADIATION PHYSICS CONSULT: CPT | Performed by: RADIOLOGY

## 2025-05-19 PROCEDURE — 77014 HC CT TREATMENT PLAN: CPT | Performed by: RADIOLOGY

## 2025-05-19 PROCEDURE — 77385 HC NTSTY MODUL RAD TX DLVR SMPL: CPT | Performed by: RADIOLOGY

## 2025-05-20 ENCOUNTER — HOSPITAL ENCOUNTER (OUTPATIENT)
Dept: RADIATION ONCOLOGY | Age: 80
Discharge: HOME | End: 2025-05-20
Payer: MEDICARE

## 2025-05-20 PROCEDURE — 77014 HC CT TREATMENT PLAN: CPT | Performed by: RADIOLOGY

## 2025-05-20 PROCEDURE — 77385 HC NTSTY MODUL RAD TX DLVR SMPL: CPT | Performed by: RADIOLOGY

## 2025-05-21 ENCOUNTER — HOSPITAL ENCOUNTER (OUTPATIENT)
Dept: RADIATION ONCOLOGY | Age: 80
Discharge: HOME | End: 2025-05-21
Payer: MEDICARE

## 2025-05-21 VITALS
SYSTOLIC BLOOD PRESSURE: 122 MMHG | HEART RATE: 86 BPM | WEIGHT: 227.3 LBS | OXYGEN SATURATION: 95 % | RESPIRATION RATE: 18 BRPM | TEMPERATURE: 97.2 F | DIASTOLIC BLOOD PRESSURE: 76 MMHG | BODY MASS INDEX: 34.56 KG/M2

## 2025-05-21 DIAGNOSIS — C85.90 LYMPHOMA, UNSPECIFIED BODY REGION, UNSPECIFIED LYMPHOMA TYPE (HCC): Primary | ICD-10-CM

## 2025-05-21 PROCEDURE — 77385 HC NTSTY MODUL RAD TX DLVR SMPL: CPT | Performed by: RADIOLOGY

## 2025-05-21 PROCEDURE — NBSRV NON-BILLABLE SERVICE: Performed by: RADIOLOGY

## 2025-05-21 PROCEDURE — 77014 HC CT TREATMENT PLAN: CPT | Performed by: RADIOLOGY

## 2025-05-21 NOTE — PROGRESS NOTES
DEPARTMENT OF RADIATION ONCOLOGY ON TREATMENT VISIT         5/21/2025      NAME:  Kailey Sims    YOB: 1945    Diagnosis: breast ENL    SUBJECTIVE:   Kailey Sims has now received fractionated external beam radiation therapy as per ILROG.    Past medical, surgical, social and family histories reviewed and updated as indicated.    Pain: controlled    ALLERGIES:  Patient has no known allergies.         Current Outpatient Medications   Medication Sig Dispense Refill    fluticasone (FLONASE) 50 MCG/ACT nasal spray 2 sprays by Each Nostril route daily 48 g 1    vitamin B-12 (CYANOCOBALAMIN) 1000 MCG tablet Take 1 tablet by mouth daily      loratadine (CLARITIN) 10 MG tablet Take 1 tablet by mouth daily 30 tablet 3    polyethylene glycol (GLYCOLAX) powder Take 17 g by mouth daily as needed       No current facility-administered medications for this encounter.           OBJECTIVE:  Alert and fully ambulatory. Pleasant and conversant.        Physical Examination: General appearance - alert, well appearing, and in no distress.          Wt Readings from Last 3 Encounters:   05/21/25 103.1 kg (227 lb 4.8 oz)   05/14/25 103.5 kg (228 lb 1.6 oz)   04/23/25 103.9 kg (229 lb)         ASSESSMENT/PLAN:     Patient is tolerating treatments well with expected toxicities. RBA were reviewed prior to first fraction and PRN.    Current and planned dose reviewed. Goals of treatment and potential side effects were reviewed with the patient PRN. Treatment imaging has been personally reviewed for accuracy and precision.    Questions answered to apparent satisfaction.    Treatments will continue as planned.      MD MS JIMI Toussaint IIIR  Radiation Oncologist        [facility] ([facility]): [phone] /// FAX: [phone]  [facility]): [phone] /// FAX: [phone]  [facility]): [phone] /// FAX: [phone]

## 2025-05-21 NOTE — PROGRESS NOTES
Kailey Sims  5/21/2025  Wt Readings from Last 3 Encounters:   05/21/25 103.1 kg (227 lb 4.8 oz)   05/14/25 103.5 kg (228 lb 1.6 oz)   04/23/25 103.9 kg (229 lb)     Body mass index is 34.56 kg/m².        Treatment Area:right breast    Patient was seen today for weekly visit.     Comfort Alteration  KPS:90%  Fatigue: Mild    Nutritional Alteration  Anorexia: No   Nausea: No   Vomiting: No     Skin Alteration   Sensation:good and using cera ve    Radiation Dermatitis:  na    Mucous Membrane Alteration  Drainage: No  Lymphedema: No    Emotional  Coping: effective    Sexuality Alteration  na    Injury, potential bleeding or infection: na        Lab Results   Component Value Date    WBC 4.6 11/13/2024    HGB 14.9 11/13/2024    HCT 48.4 (H) 11/13/2024     11/13/2024         /76   Pulse 86   Temp 97.2 °F (36.2 °C) (Skin)   Resp 18   Wt 103.1 kg (227 lb 4.8 oz)   SpO2 95%   BMI 34.56 kg/m²   BP within normal range? yes           Assessment/Plan: 7/15fx  1400/3000cGy and tolerating well.    Gertrude Williamson RN

## 2025-05-21 NOTE — PATIENT INSTRUCTIONS
Continue daily fractionated radiation therapy as scheduled. Please see weekly OTV note and intial consultation letter in EPIC for clinical details.        Cody Gusman III, MD MS DABR    [facility]:  [phone]   FAX: [phone]  [facility]:  [phone]   FAX:    [phone]  [facility]:  [phone]   FAX:  [phone]  Email: [email or web address]

## 2025-05-22 ENCOUNTER — HOSPITAL ENCOUNTER (OUTPATIENT)
Dept: RADIATION ONCOLOGY | Age: 80
Discharge: HOME | End: 2025-05-22
Payer: MEDICARE

## 2025-05-22 PROCEDURE — 77014 HC CT TREATMENT PLAN: CPT | Performed by: RADIOLOGY

## 2025-05-22 PROCEDURE — 77385 HC NTSTY MODUL RAD TX DLVR SMPL: CPT | Performed by: RADIOLOGY

## 2025-05-23 ENCOUNTER — HOSPITAL ENCOUNTER (OUTPATIENT)
Dept: RADIATION ONCOLOGY | Age: 80
Discharge: HOME | End: 2025-05-23
Payer: MEDICARE

## 2025-05-23 PROCEDURE — 77385 HC NTSTY MODUL RAD TX DLVR SMPL: CPT | Performed by: RADIOLOGY

## 2025-05-23 PROCEDURE — 77014 HC CT TREATMENT PLAN: CPT | Performed by: RADIOLOGY

## 2025-05-27 ENCOUNTER — HOSPITAL ENCOUNTER (OUTPATIENT)
Dept: RADIATION ONCOLOGY | Age: 80
Discharge: HOME | End: 2025-05-27
Payer: MEDICARE

## 2025-05-27 PROCEDURE — 77336 RADIATION PHYSICS CONSULT: CPT | Performed by: RADIOLOGY

## 2025-05-27 PROCEDURE — 77014 HC CT TREATMENT PLAN: CPT | Performed by: RADIOLOGY

## 2025-05-27 PROCEDURE — 77385 HC NTSTY MODUL RAD TX DLVR SMPL: CPT | Performed by: RADIOLOGY

## 2025-05-28 ENCOUNTER — HOSPITAL ENCOUNTER (OUTPATIENT)
Dept: RADIATION ONCOLOGY | Age: 80
Discharge: HOME | End: 2025-05-28
Payer: MEDICARE

## 2025-05-28 VITALS
HEART RATE: 73 BPM | BODY MASS INDEX: 34.7 KG/M2 | TEMPERATURE: 97 F | OXYGEN SATURATION: 97 % | DIASTOLIC BLOOD PRESSURE: 79 MMHG | WEIGHT: 228.2 LBS | RESPIRATION RATE: 18 BRPM | SYSTOLIC BLOOD PRESSURE: 137 MMHG

## 2025-05-28 DIAGNOSIS — C81.90 HODGKIN LYMPHOMA, UNSPECIFIED HODGKIN LYMPHOMA TYPE, UNSPECIFIED BODY REGION (HCC): Primary | ICD-10-CM

## 2025-05-28 PROCEDURE — 77014 HC CT TREATMENT PLAN: CPT | Performed by: RADIOLOGY

## 2025-05-28 PROCEDURE — NBSRV NON-BILLABLE SERVICE: Performed by: RADIOLOGY

## 2025-05-28 PROCEDURE — 77385 HC NTSTY MODUL RAD TX DLVR SMPL: CPT | Performed by: RADIOLOGY

## 2025-05-28 NOTE — PROGRESS NOTES
Kailey Sims  5/28/2025  Wt Readings from Last 3 Encounters:   05/28/25 103.5 kg (228 lb 3.2 oz)   05/21/25 103.1 kg (227 lb 4.8 oz)   05/14/25 103.5 kg (228 lb 1.6 oz)     Body mass index is 34.7 kg/m².        Treatment Area: r breast    Patient was seen today for weekly visit.     Comfort Alteration  KPS:80%  Fatigue: Mild    Nutritional Alteration  Anorexia: No   Nausea: No   Vomiting: No     Skin Alteration   Sensation:no    Radiation Dermatitis:  yes    Mucous Membrane Alteration  Drainage: No  Lymphedema: No    Emotional  Coping: effective    Sexuality Alteration  na    Injury, potential bleeding or infection: no        Lab Results   Component Value Date    WBC 4.6 11/13/2024    HGB 14.9 11/13/2024    HCT 48.4 (H) 11/13/2024     11/13/2024         /79   Pulse 73   Temp 97 °F (36.1 °C)   Resp 18   Wt 103.5 kg (228 lb 3.2 oz)   SpO2 97%   BMI 34.70 kg/m²   BP within normal range? yes        Assessment/Plan: Pt completed 11/15fx and 2200/3000cGy.    Melissa Angela RN

## 2025-05-28 NOTE — PROGRESS NOTES
Wt Readings from Last 3 Encounters:   05/28/25 103.5 kg (228 lb 3.2 oz)   05/21/25 103.1 kg (227 lb 4.8 oz)   05/14/25 103.5 kg (228 lb 1.6 oz)     Met w/ pt during weekly visit. Pt w/ prior concerns regarding poor PO intake. She has maintained wt over last two weeks since initiation of radiation therapy. She reports she is feeling well, though admits to occasional issues w/ reflux. These episodes are short-lived and she does not require use of OTC products to control symptoms. She denies any additional nutrition concerns at this time. Will follow PRN.  Electronically signed by Talisha Burkett MS, RD, LD on 5/28/2025 at 2:06 PM

## 2025-05-28 NOTE — PROGRESS NOTES
DEPARTMENT OF RADIATION ONCOLOGY ON TREATMENT VISIT         5/28/2025      NAME:  Kailey Sims    YOB: 1945    Diagnosis: breast lymphoma    SUBJECTIVE:   Kailey Sims has now received fractionated external beam radiation therapy - ongoing.    Past medical, surgical, social and family histories reviewed and updated as indicated.    Pain: controlled    ALLERGIES:  Patient has no known allergies.         Current Outpatient Medications   Medication Sig Dispense Refill    fluticasone (FLONASE) 50 MCG/ACT nasal spray 2 sprays by Each Nostril route daily 48 g 1    vitamin B-12 (CYANOCOBALAMIN) 1000 MCG tablet Take 1 tablet by mouth daily      loratadine (CLARITIN) 10 MG tablet Take 1 tablet by mouth daily 30 tablet 3    polyethylene glycol (GLYCOLAX) powder Take 17 g by mouth daily as needed       No current facility-administered medications for this encounter.           OBJECTIVE:  Alert and fully ambulatory. Pleasant and conversant.        Physical Examination: General appearance - alert, well appearing, and in no distress.          Wt Readings from Last 3 Encounters:   05/28/25 103.5 kg (228 lb 3.2 oz)   05/21/25 103.1 kg (227 lb 4.8 oz)   05/14/25 103.5 kg (228 lb 1.6 oz)         ASSESSMENT/PLAN:     Patient is tolerating treatments well with expected toxicities. RBA were reviewed prior to first fraction and PRN.    Current and planned dose reviewed. Goals of treatment and potential side effects were reviewed with the patient PRN. Treatment imaging has been personally reviewed for accuracy and precision.    Questions answered to apparent satisfaction.    Treatments will continue as planned.        Cody Gusman III, MD MS JIMIR  Radiation Oncologist        [facility] ([facility]): [phone] /// FAX: [phone]  [facility]): [phone] /// FAX: [phone]  [facility]): [phone] /// FAX: [phone]

## 2025-05-29 ENCOUNTER — HOSPITAL ENCOUNTER (OUTPATIENT)
Dept: RADIATION ONCOLOGY | Age: 80
Discharge: HOME | End: 2025-05-29
Payer: MEDICARE

## 2025-05-29 PROCEDURE — 77014 HC CT TREATMENT PLAN: CPT | Performed by: RADIOLOGY

## 2025-05-29 PROCEDURE — 77385 HC NTSTY MODUL RAD TX DLVR SMPL: CPT | Performed by: RADIOLOGY

## 2025-05-30 ENCOUNTER — HOSPITAL ENCOUNTER (OUTPATIENT)
Dept: RADIATION ONCOLOGY | Age: 80
Discharge: HOME | End: 2025-05-30
Payer: MEDICARE

## 2025-05-30 PROCEDURE — 77385 HC NTSTY MODUL RAD TX DLVR SMPL: CPT | Performed by: RADIOLOGY

## 2025-06-02 ENCOUNTER — HOSPITAL ENCOUNTER (OUTPATIENT)
Dept: RADIATION ONCOLOGY | Age: 80
Discharge: HOME | End: 2025-06-02
Payer: MEDICARE

## 2025-06-02 PROCEDURE — 77385 HC NTSTY MODUL RAD TX DLVR SMPL: CPT | Performed by: RADIOLOGY

## 2025-06-02 PROCEDURE — 77014 CHG CT GUIDANCE RADIATION THERAPY FLDS PLACEMENT: CPT | Performed by: RADIOLOGY

## 2025-06-03 ENCOUNTER — HOSPITAL ENCOUNTER (OUTPATIENT)
Dept: RADIATION ONCOLOGY | Age: 80
Discharge: HOME | End: 2025-06-03
Payer: MEDICARE

## 2025-06-03 PROCEDURE — 77336 RADIATION PHYSICS CONSULT: CPT | Performed by: RADIOLOGY

## 2025-06-03 PROCEDURE — 77385 HC NTSTY MODUL RAD TX DLVR SMPL: CPT | Performed by: RADIOLOGY

## 2025-07-22 ENCOUNTER — HOSPITAL ENCOUNTER (OUTPATIENT)
Dept: RADIATION ONCOLOGY | Age: 80
Discharge: HOME | End: 2025-07-22

## 2025-07-22 ENCOUNTER — TELEPHONE (OUTPATIENT)
Dept: CASE MANAGEMENT | Age: 80
End: 2025-07-22

## 2025-07-22 ENCOUNTER — OFFICE VISIT (OUTPATIENT)
Dept: FAMILY MEDICINE CLINIC | Age: 80
End: 2025-07-22

## 2025-07-22 VITALS
TEMPERATURE: 97.4 F | OXYGEN SATURATION: 95 % | SYSTOLIC BLOOD PRESSURE: 124 MMHG | HEART RATE: 55 BPM | DIASTOLIC BLOOD PRESSURE: 82 MMHG | BODY MASS INDEX: 34.56 KG/M2 | HEIGHT: 68 IN | WEIGHT: 228 LBS | RESPIRATION RATE: 18 BRPM

## 2025-07-22 VITALS
BODY MASS INDEX: 34.41 KG/M2 | OXYGEN SATURATION: 98 % | TEMPERATURE: 97.6 F | RESPIRATION RATE: 18 BRPM | WEIGHT: 226.3 LBS | SYSTOLIC BLOOD PRESSURE: 124 MMHG | HEART RATE: 68 BPM | DIASTOLIC BLOOD PRESSURE: 81 MMHG

## 2025-07-22 DIAGNOSIS — M67.912 ROTATOR CUFF DISORDER, LEFT: Primary | ICD-10-CM

## 2025-07-22 DIAGNOSIS — C82.00 FOLLICULAR LYMPHOMA GRADE I, UNSPECIFIED BODY REGION (HCC): Primary | ICD-10-CM

## 2025-07-22 DIAGNOSIS — C82.00 FOLLICULAR LYMPHOMA GRADE I, UNSPECIFIED BODY REGION (HCC): ICD-10-CM

## 2025-07-22 DIAGNOSIS — M79.602 LEFT ARM PAIN: ICD-10-CM

## 2025-07-22 NOTE — PROGRESS NOTES
RADIATION ONCOLOGY- [facility]  6 week follow up       07/22/2205       NAME:  Kailey Sims    YOB: 1945    Diagnosis: Follicular lymphoma.     Subjective:  On 06/03/2025, Kailey Sims completed ISRT directed to the R breast, total dose 3000 cGy in 15 fractions.     The patient is seen today in 6 week post radiation therapy completion visit. The patient denies skin complaints or pain complaints to the treatment site. No fevers or chills. No nausea or vomiting. No change in appetite.       Patient is following with:    Medical Oncology- Dr. Kim.     Pain: No acute pain complaints.     Past medical, surgical, social and family histories reviewed and updated as indicated.    ALLERGIES:  Patient has no known allergies.         Current Outpatient Medications   Medication Sig Dispense Refill    fluticasone (FLONASE) 50 MCG/ACT nasal spray 2 sprays by Each Nostril route daily 48 g 1    vitamin B-12 (CYANOCOBALAMIN) 1000 MCG tablet Take 1 tablet by mouth daily      loratadine (CLARITIN) 10 MG tablet Take 1 tablet by mouth daily 30 tablet 3    polyethylene glycol (GLYCOLAX) powder Take 17 g by mouth daily as needed       No current facility-administered medications for this encounter.       Physical Examination:   Vitals:    07/22/25 1333   BP: 124/81   Pulse: 68   Resp: 18   Temp: 97.6 °F (36.4 °C)   SpO2: 98%       Wt Readings from Last 3 Encounters:   07/22/25 102.6 kg (226 lb 4.8 oz)   05/28/25 103.5 kg (228 lb 3.2 oz)   05/21/25 103.1 kg (227 lb 4.8 oz)       Alert and fully ambulatory. Pleasant and conversant.      Irradiated skin shows no radiation dermatitis noted.       ASSESSMENT/PLAN:     Follicular lymphoma Gr 1. S/p ISRT completion 06/03/2025.     The patient is doing well post radiation therapy completion. Skin care discussed.     Post treatment imaging per Medical Oncology.     I discussed follow up plans with Kailey Sims.  At this time, continue Oncology follow-up with primary Medical

## 2025-07-22 NOTE — PROGRESS NOTES
Kailey Sims  7/22/2025  1:34 PM      Vitals:    07/22/25 1333   BP: 124/81   Pulse: 68   Resp: 18   Temp: 97.6 °F (36.4 °C)   SpO2: 98%    :    Wt Readings from Last 3 Encounters:   07/22/25 102.6 kg (226 lb 4.8 oz)   05/28/25 103.5 kg (228 lb 3.2 oz)   05/21/25 103.1 kg (227 lb 4.8 oz)                Current Outpatient Medications:     fluticasone (FLONASE) 50 MCG/ACT nasal spray, 2 sprays by Each Nostril route daily, Disp: 48 g, Rfl: 1    vitamin B-12 (CYANOCOBALAMIN) 1000 MCG tablet, Take 1 tablet by mouth daily, Disp: , Rfl:     loratadine (CLARITIN) 10 MG tablet, Take 1 tablet by mouth daily, Disp: 30 tablet, Rfl: 3    polyethylene glycol (GLYCOLAX) powder, Take 17 g by mouth daily as needed, Disp: , Rfl:       Patient is seen today in follow up for completion r breast 5/13/25-6/3/25 15fx/3000cGy.        FALLS RISK SCREENING ASSESSMENT    Instructions:  Assess the patient and enter the appropriate indicators that are present for fall risk identification.   Total the numbers entered and assign a fall risk score from Table 2.  Reassess patient at a minimum every 12 weeks or with status change.    Assessment   Date  7/22/2025     1.  Mental Ability: confusion/cognitively impaired No - 0       2.  Elimination Issues: incontinence, frequency No - 0       3.  Ambulatory: use of assistive devices (walker, cane, off-loading devices), attached to equipment (IV pole, oxygen) No - 0     4.  Sensory Limitations: dizziness, vertigo, impaired vision No - 0       5.  Age 65 years or greater - 1       6.  Medication: diuretics, strong analgesics, hypnotics, sedatives, antihypertensive agents   Yes - 3   7.  Falls:  recent history of falls within the last 3 months (not to include slipping or tripping)   No - 0   TOTAL 4    If score of 4 or greater was education given? Yes       TABLE 2   Risk Score Risk Level Plan of Care   0-3 Little or  No Risk 1.  Provide assistance as indicated for ambulation activities  2.  Reorient

## 2025-07-22 NOTE — TELEPHONE ENCOUNTER
Met with patient prior to her follow up appointment with POLO Oconnell today. Patient completed her active treatment June 2023 for her Follicular Lymphoma cancer. Patient appears in good spirits. States she is doing very well. She continues to follow with Dr Kim for follow ups and is scheduled with her new Primary Care Physician for follow up today. Provided support and encouragement. Instructed patient in detail on her Cancer Treatment Summary and Survivorship Care Plan. Copies sent to patient's PCP. Provided written copies of Treatment Summary/Survivorship Care Plan, Patient Resource: Cancer Survivorship: A Guide for Patients and Their Families booklet, [facility] Healthy Eating After Cancer Treatment, [facility] Lymphoma Resources, Breast Exam handout, [facility], [facility] and [facility]. Reviewed [facility] Survivorship program with periodic phone calls from me for follow up to review health status, any health concerns and to check up for any needs or referral needs. The first call will be in 6-8 weeks. Encouraged to call with any questions or concerns between visits or phone calls. Verbally agreed. Patient appreciative of visit and information.

## 2025-07-24 NOTE — PROGRESS NOTES
Patient is a 79-year-old female with past medical history of stage I lymphoma status post radiation, vitamin D deficiency.  Today she presents the clinic to establish care with a new PCP.  Previously saw Dr. Sun.  She also has complaints of left arm pain.    States that she has had chronic left arm pain for years.  Pain characterized as a dull ache located in the mid humerus.  Does have a history of some rotator cuff dysfunction but states that this pain feels different from her recurrent shoulder pain.  Has decreased range of motion with decreased ability to AB duct above 90 degrees as well as internally rotates the arm.  States that she has never used any topical creams, lidocaine patches for the pain.  Was previously ordered x-rays of the left shoulder and left humerus in the past but had never had the images performed.  She currently follows with Dr. Kim for her follicular lymphoma stage I.  States that she has a follow-up appointment with him in approximately 2 weeks.    Blood pressure 124/82, pulse 55, temperature 97.4 °F (36.3 °C), temperature source Temporal, resp. rate 18, height 1.727 m (5' 8\"), weight 103.4 kg (228 lb), SpO2 95%.    HEENT WNL     Heart regular    Lungs clear    abd non-tender      point tenderness palpation of the mid humerus or posterior surface, pain with abduction above 90 degrees, palpable pulses in bilateral upper extremities, no overlying skin changes over the left arm, no point tenderness to palpation of the left shoulder presents   pulses intact     Assessment and plan    Left arm pain  Plan for x-ray of left shoulder and left humerus  Did advise patient to inform oncologist of pain as PET scan or whole-body scan may be necessary.  Patient states that she did have a PET scan recently although images are not available in EMR.  Will obtain records from oncologist for thorough assessment of report.  Will decide further management pending images.  Did discuss possibility of PMNR 
screening mammogram back to 2022 in media.    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 RN reached out to breast clinic and PCP office 3/13 and requested referral to oncology.    Seen Dr. Kim and had PET CT done  No signs of mets  Seen Dr. Gusman for rad onc and commencing radiation in next week    Denies sweating at night  Denies dyspnea at rest    Using vaseline for finger (R hand 3 digit)  Has some clear liquid but denies pus  Denies pain    Has cruise planned and paid for for June 2025    Last Visit  : 10/4/2024    Health Maintenance :  Health Maintenance Due   Topic Date Due    Shingles vaccine (1 of 2) Never done    Respiratory Syncytial Virus (RSV) Pregnant or age 60 yrs+ (1 - 1-dose 75+ series) Never done    COVID-19 Vaccine (5 - 2024-25 season) 09/01/2024    Annual Wellness Visit (Medicare)  03/09/2025       Review of Systems :  An extended review of symptoms obtained during physical exam was otherwise unremarkable    ______________________________________________________________________    Physical Exam :  Last 3 weights:   Wt Readings from Last 3 Encounters:   07/22/25 102.6 kg (226 lb 4.8 oz)   07/22/25 103.4 kg (228 lb)   05/28/25 103.5 kg (228 lb 3.2 oz)     Vitals: /82 (BP Site: Right Upper Arm, Patient Position: Sitting, BP Cuff Size: Large Adult)   Pulse 55   Temp 97.4 °F (36.3 °C) (Temporal)   Resp 18   Ht 1.727 m (5' 8\")   Wt 103.4 kg (228 lb)   SpO2 95%   BMI 34.67 kg/m²   General Appearance: Well developed, awake, alert, oriented, and in NAD  Chest wall/Lung: CTAB, respirations unlabored. No ronchi/wheezing/rales  Heart: RRR, normal S1 and S2, no murmurs, rubs or gallops.  Abdomen: SNTND  Extremities: Extremities normal, atraumatic, no cyanosis, clubbing or edema.  Lymphnodes: No lymph node enlargement appreciated  Neurologic: Alert&Oriented x3. No focal motor deficits detected.   Psychiatric: Normal mood. Normal affect. Normal behavior.   
respiratory distress.      Breath sounds: Normal breath sounds.   Abdominal:      General: Bowel sounds are normal. There is no distension.      Palpations: Abdomen is soft.      Tenderness: There is no abdominal tenderness.   Musculoskeletal:      Left shoulder: Tenderness and bony tenderness present. Decreased range of motion.      Left upper arm: Tenderness present.      Comments: Point tenderness palpation of the mid humerus, posterior surface  Pain with abduction above 90 degrees   Palpable pulses in bilateral upper extremities, no overlying skin changes over the left arm, no point tenderness to palpation of the left shoulder presents    Neurological:      Mental Status: She is oriented to person, place, and time.   Psychiatric:         Mood and Affect: Mood normal.         Behavior: Behavior normal.       ______________________________________________________________________    Assessment & Plan:    1. Rotator cuff disorder, left  2. Left arm pain  Plan for x-ray of left shoulder and left humerus  Did advise patient to inform oncologist of pain as PET scan or whole-body scan may be necessary.  Patient states that she did have a PET scan recently although images are not available in EMR.  Will obtain records from oncologist for thorough assessment of report.  Will decide further management pending images.  Did discuss possibility of PMR with patient depending on x-ray findings, she is agreeable.      3. Follicular lymphoma grade I, unspecified body region (HCC)  S/p radiation  Continue present management with Dr. Gusman and Dr. Kim        Follow-up in 2 months for arm pain    Return to Office: Return in about 8 weeks (around 9/16/2025) for left arm pain .    Advised to be adherent to the treatment plans discussed today, and please call with any questions or concerns, letting the office know of any reasons that the plans may not be followed.  The risks of untreated conditions include worsening illness,

## 2025-07-28 ENCOUNTER — HOSPITAL ENCOUNTER (OUTPATIENT)
Age: 80
Discharge: HOME | End: 2025-07-30
Payer: MEDICARE

## 2025-07-28 ENCOUNTER — HOSPITAL ENCOUNTER (OUTPATIENT)
Dept: GENERAL RADIOLOGY | Age: 80
Discharge: HOME | End: 2025-07-30
Payer: MEDICARE

## 2025-07-28 DIAGNOSIS — M67.912 ROTATOR CUFF DISORDER, LEFT: ICD-10-CM

## 2025-07-28 DIAGNOSIS — M79.602 LEFT ARM PAIN: ICD-10-CM

## 2025-07-28 PROCEDURE — 73060 X-RAY EXAM OF HUMERUS: CPT

## 2025-07-28 PROCEDURE — 73030 X-RAY EXAM OF SHOULDER: CPT

## (undated) DEVICE — GAUZE,SPONGE,POST-OP,4X3,STRL,LF: Brand: MEDLINE

## (undated) DEVICE — Z DISCONTINUED NO SUB IDED TUBING ETCO2 AD L6.5FT NSL ORAL CVD PRNG NONFLARED TIP OVR

## (undated) DEVICE — BITEBLOCK 54FR W/ DENT RIM BLOX

## (undated) DEVICE — DEFENDO AIR WATER SUCTION AND BIOPSY VALVE KIT FOR  OLYMPUS: Brand: DEFENDO AIR/WATER/SUCTION AND BIOPSY VALVE

## (undated) DEVICE — SYRINGE MED 50ML LUERSLIP TIP

## (undated) DEVICE — FORCEPS BX L160CM JAW DIA2.4MM YEL L CAP W/ NDL DISP RAD

## (undated) DEVICE — CONTAINER SPEC 60ML PH 7NEUTRAL BUFF FRMLN RDY TO USE

## (undated) DEVICE — CONNECTOR IRRIGATION AUXILIARY H2O JET W/ PRT MTL THRD HYDR